# Patient Record
Sex: FEMALE | Race: WHITE | NOT HISPANIC OR LATINO | Employment: FULL TIME | URBAN - METROPOLITAN AREA
[De-identification: names, ages, dates, MRNs, and addresses within clinical notes are randomized per-mention and may not be internally consistent; named-entity substitution may affect disease eponyms.]

---

## 2017-03-24 ENCOUNTER — OFFICE VISIT (OUTPATIENT)
Dept: FAMILY MEDICINE | Facility: CLINIC | Age: 47
End: 2017-03-24
Payer: COMMERCIAL

## 2017-03-24 DIAGNOSIS — L84 CORN OR CALLUS: ICD-10-CM

## 2017-03-24 DIAGNOSIS — Z86.018 HISTORY OF DYSPLASTIC NEVUS: ICD-10-CM

## 2017-03-24 DIAGNOSIS — D22.9 MULTIPLE BENIGN MELANOCYTIC NEVI: ICD-10-CM

## 2017-03-24 DIAGNOSIS — Z12.83 SKIN CANCER SCREENING: Primary | ICD-10-CM

## 2017-03-24 DIAGNOSIS — Q82.5 CONGENITAL NEVUS OF BACK: ICD-10-CM

## 2017-03-24 PROCEDURE — 99213 OFFICE O/P EST LOW 20 MIN: CPT | Performed by: FAMILY MEDICINE

## 2017-03-24 NOTE — MR AVS SNAPSHOT
"              After Visit Summary   3/24/2017    Mildred Tellez    MRN: [id number]           Patient Information     Date Of Birth          1970        Visit Information        Provider Department      3/24/2017 12:00 PM Jenny Feliz MD [facility] - Primary Care Skin        Today's Diagnoses     Skin cancer screening    -  1    History of dysplastic nevus        Multiple benign melanocytic nevi        Corn or callus        Congenital nevus of back          Care Instructions    FUTURE APPOINTMENTS  Follow up in 1 year(s) for a full-body skin cancer screening.    TIPS FOR AVOIDING SKIN CANCER AND PREMATURE SKIN AGING  DOs    Wear a wide-brimmed hat and sunglasses.     Wear sun-protective clothing.    DearJane and other [employer] make sun protective clothing that is stylish, comfortable and cool.    [employer]s [employer]ius and other companies make UV arm sleeves suitable for golfing, gardening and other activities.      Wear sunscreen on your face every day, even in the winter. (UVA \"aging rays\" penetrates window glass and is just as strong in the winter as in the summer) Sunscreen with SPF > 30 is recommended.    Wear sunscreen on your body and re-apply every 2 hours when exposed to sun. Sunscreen with SPF > 50 is recommended.    You should use about 3 tablespoons of sunscreen to protect your whole body. Thus a typical eight ounce bottle of sunscreen should last 4 applications. Remember, that the SPF rating is compromised if you don t apply enough. Most people only apply 1/2 - 1/3 of the amount they need. Also don t forget areas such as your ears, feet, upper back and harder to reach places. Keep in mind that these amounts should be increased for larger body sizes.    Note that spray sunscreens are only for touch-up application, not as a base layer. Also, use spray sunscreens with caution around small children due to inhalation risk.    Product Recommendations:    Look for broad spectrum " "sunscreen (blocks both UVA and UVB).    Look for titanium dioxide and/or zinc oxide in the active ingredients, which are physical blockers as opposed to chemical blockers. Chemical-free sunscreens should not irritate the skin.    Good examples include: Blue Lizard, EltaMD, Vanicream, Solbar, CeraVe.     For sensitive skin, consider : SkinMedica Essential Defense Mineral Shield Broad Spectrum SPF 35      Avoid combination products that include both sunscreen and insect repellant, as sunscreen should be applied every 2 hours, but insect repellant should not be applied as frequently.    Avoid products that include oxybenzone or retinyl palmitate.    For more information:  http://www.skincancer.org/prevention/sun-protection/sunscreen/sunscreens-safe-and-effective    DON'Ts    All tanning damages the skin. Aim for ivory skin year round and you will have less trouble with your skin in years to come. There is no merit in getting \"a base tan\" before a warm weather vacation, as any tanning indicates your body's response to sun damage.    Never use tanning beds. Tanning beds are associated with much higher risks of skin cancer.    Avoid mid-day sunshine (10 AM to 3 PM), if possible.    Stop smoking. Smokers have higher rates of skin cancer and also have premature skin wrinkling.        Follow-ups after your visit        Who to contact     If you have questions or need follow up information about today's clinic visit or your schedule please contact [facility] - PRIMARY CARE SKIN directly at [phone].  Normal or non-critical lab and imaging results will be communicated to you by Volteahart, letter or phone within 4 business days after the clinic has received the results. If you do not hear from us within 7 days, please contact the clinic through Volteahart or phone. If you have a critical or abnormal lab result, we will notify you by phone as soon as possible.  Submit refill requests through [employer] or call your pharmacy " and they will forward the refill request to us. Please allow 3 business days for your refill to be completed.          Additional Information About Your Visit        Uboolyhart Information     AskforTask gives you secure access to your electronic health record. If you see a primary care provider, you can also send messages to your care team and make appointments. If you have questions, please call your primary care clinic.  If you do not have a primary care provider, please call [phone] and they will assist you.        Care EveryWhere ID     This is your Care EveryWhere ID. This could be used by other organizations to access your [place] medical records  [id number]         Blood Pressure from Last 3 Encounters:   09/23/16 102/62   09/10/16 112/64   06/28/16 120/60    Weight from Last 3 Encounters:   09/23/16 149 lb (67.6 kg)   09/10/16 150 lb 8 oz (68.3 kg)   06/28/16 150 lb (68 kg)              Today, you had the following     No orders found for display       Primary Care Provider Office Phone # Fax #    Mary Langston [phone]       [facility] [address] [place] [address]        Thank you!     Thank you for choosing [facility] - PRIMARY CARE SKIN  for your care. Our goal is always to provide you with excellent care. Hearing back from our patients is one way we can continue to improve our services. Please take a few minutes to complete the written survey that you may receive in the mail after your visit with us. Thank you!             Your Updated Medication List - Protect others around you: Learn how to safely use, store and throw away your medicines at www.disposemymeds.org.          This list is accurate as of: 3/24/17 12:39 PM.  Always use your most recent med list.                   Brand Name Dispense Instructions for use    amoxicillin 875 MG tablet    AMOXIL    20 tablet    Take 1 tablet (875 mg) by mouth 2 times daily       levothyroxine 125  MCG tablet    SYNTHROID/LEVOTHROID    90 tablet    TAKE ONE TABLET BY MOUTH ONCE DAILY

## 2017-03-24 NOTE — PATIENT INSTRUCTIONS
"FUTURE APPOINTMENTS  Follow up in 1 year(s) for a full-body skin cancer screening.    TIPS FOR AVOIDING SKIN CANCER AND PREMATURE SKIN AGING  DOs    Wear a wide-brimmed hat and sunglasses.     Wear sun-protective clothing.    [employer] and other Rather[employer] make sun protective clothing that is stylish, comfortable and cool.    Ulympix and other Rather[employer] make UV arm sleeves suitable for golfing, gardening and other activities.      Wear sunscreen on your face every day, even in the winter. (UVA \"aging rays\" penetrates window glass and is just as strong in the winter as in the summer) Sunscreen with SPF > 30 is recommended.    Wear sunscreen on your body and re-apply every 2 hours when exposed to sun. Sunscreen with SPF > 50 is recommended.    You should use about 3 tablespoons of sunscreen to protect your whole body. Thus a typical eight ounce bottle of sunscreen should last 4 applications. Remember, that the SPF rating is compromised if you don t apply enough. Most people only apply 1/2 - 1/3 of the amount they need. Also don t forget areas such as your ears, feet, upper back and harder to reach places. Keep in mind that these amounts should be increased for larger body sizes.    Note that spray sunscreens are only for touch-up application, not as a base layer. Also, use spray sunscreens with caution around small children due to inhalation risk.    Product Recommendations:    Look for broad spectrum sunscreen (blocks both UVA and UVB).    Look for titanium dioxide and/or zinc oxide in the active ingredients, which are physical blockers as opposed to chemical blockers. Chemical-free sunscreens should not irritate the skin.    Good examples include: Blue Lizard, EltaMD, Vanicream, Solbar, CeraVe.     For sensitive skin, consider : SkinMedica Essential Defense Mineral Shield Broad Spectrum SPF 35      Avoid combination products that include both sunscreen and insect repellant, as sunscreen should be " "applied every 2 hours, but insect repellant should not be applied as frequently.    Avoid products that include oxybenzone or retinyl palmitate.    For more information:  http://www.skincancer.org/prevention/sun-protection/sunscreen/sunscreens-safe-and-effective    DON'Ts    All tanning damages the skin. Aim for ivory skin year round and you will have less trouble with your skin in years to come. There is no merit in getting \"a base tan\" before a warm weather vacation, as any tanning indicates your body's response to sun damage.    Never use tanning beds. Tanning beds are associated with much higher risks of skin cancer.    Avoid mid-day sunshine (10 AM to 3 PM), if possible.    Stop smoking. Smokers have higher rates of skin cancer and also have premature skin wrinkling.  "

## 2017-05-01 DIAGNOSIS — E03.9 HYPOTHYROIDISM, UNSPECIFIED: ICD-10-CM

## 2017-05-02 RX ORDER — LEVOTHYROXINE SODIUM 125 UG/1
TABLET ORAL
Qty: 90 TABLET | Refills: 1 | Status: SHIPPED | OUTPATIENT
Start: 2017-05-02 | End: 2017-09-15

## 2017-05-02 NOTE — TELEPHONE ENCOUNTER
levothyroxine (SYNTHROID, LEVOTHROID) 125 MCG tablet  Last Written Prescription Date: 11/21/2016  Last Quantity: 90, # refills: 1  Last Office Visit with G, P or [facility] prescribing provider: 9/23/2016        TSH   Date Value Ref Range Status   09/10/2016 1.20 0.40 - 4.00 mU/L Final     Refill per RN protocol     Magdalena Morales RN, BSN  [facility]

## 2017-06-21 PROCEDURE — G0202 SCR MAMMO BI INCL CAD: HCPCS | Mod: TC

## 2017-09-15 ENCOUNTER — OFFICE VISIT (OUTPATIENT)
Dept: FAMILY MEDICINE | Facility: CLINIC | Age: 47
End: 2017-09-15
Payer: COMMERCIAL

## 2017-09-15 VITALS
SYSTOLIC BLOOD PRESSURE: 100 MMHG | DIASTOLIC BLOOD PRESSURE: 66 MMHG | HEART RATE: 71 BPM | TEMPERATURE: 98.7 F | HEIGHT: 63 IN | WEIGHT: 154 LBS | OXYGEN SATURATION: 98 % | BODY MASS INDEX: 27.29 KG/M2

## 2017-09-15 DIAGNOSIS — E78.5 HYPERLIPIDEMIA LDL GOAL <160: ICD-10-CM

## 2017-09-15 DIAGNOSIS — E03.9 HYPOTHYROIDISM, UNSPECIFIED: Primary | ICD-10-CM

## 2017-09-15 DIAGNOSIS — Z23 NEEDS FLU SHOT: ICD-10-CM

## 2017-09-15 DIAGNOSIS — Z00.01 ENCOUNTER FOR ROUTINE ADULT MEDICAL EXAM WITH ABNORMAL FINDINGS: ICD-10-CM

## 2017-09-15 DIAGNOSIS — Z23 NEED FOR PROPHYLACTIC VACCINATION AND INOCULATION AGAINST INFLUENZA: ICD-10-CM

## 2017-09-15 LAB
ALBUMIN SERPL-MCNC: 3.8 G/DL (ref 3.4–5)
ALP SERPL-CCNC: 48 U/L (ref 40–150)
ALT SERPL W P-5'-P-CCNC: 24 U/L (ref 0–50)
ANION GAP SERPL CALCULATED.3IONS-SCNC: 7 MMOL/L (ref 3–14)
AST SERPL W P-5'-P-CCNC: 9 U/L (ref 0–45)
BILIRUB SERPL-MCNC: 0.3 MG/DL (ref 0.2–1.3)
BUN SERPL-MCNC: 14 MG/DL (ref 7–30)
CALCIUM SERPL-MCNC: 8.7 MG/DL (ref 8.5–10.1)
CHLORIDE SERPL-SCNC: 108 MMOL/L (ref 94–109)
CHOLEST SERPL-MCNC: 178 MG/DL
CO2 SERPL-SCNC: 25 MMOL/L (ref 20–32)
CREAT SERPL-MCNC: 0.75 MG/DL (ref 0.52–1.04)
ERYTHROCYTE [DISTWIDTH] IN BLOOD BY AUTOMATED COUNT: 12.7 % (ref 10–15)
GFR SERPL CREATININE-BSD FRML MDRD: 83 ML/MIN/1.7M2
GLUCOSE SERPL-MCNC: 85 MG/DL (ref 70–99)
HCT VFR BLD AUTO: 41.1 % (ref 35–47)
HDLC SERPL-MCNC: 41 MG/DL
HGB BLD-MCNC: 13.8 G/DL (ref 11.7–15.7)
LDLC SERPL CALC-MCNC: 117 MG/DL
MCH RBC QN AUTO: 31.9 PG (ref 26.5–33)
MCHC RBC AUTO-ENTMCNC: 33.6 G/DL (ref 31.5–36.5)
MCV RBC AUTO: 95 FL (ref 78–100)
NONHDLC SERPL-MCNC: 137 MG/DL
PLATELET # BLD AUTO: 303 10E9/L (ref 150–450)
POTASSIUM SERPL-SCNC: 4.2 MMOL/L (ref 3.4–5.3)
PROT SERPL-MCNC: 7.6 G/DL (ref 6.8–8.8)
RBC # BLD AUTO: 4.33 10E12/L (ref 3.8–5.2)
SODIUM SERPL-SCNC: 140 MMOL/L (ref 133–144)
T3 SERPL-MCNC: 89 NG/DL (ref 60–181)
T4 FREE SERPL-MCNC: 1.45 NG/DL (ref 0.76–1.46)
TRIGL SERPL-MCNC: 101 MG/DL
TSH SERPL DL<=0.005 MIU/L-ACNC: 2.61 MU/L (ref 0.4–4)
WBC # BLD AUTO: 10.2 10E9/L (ref 4–11)

## 2017-09-15 PROCEDURE — 84439 ASSAY OF FREE THYROXINE: CPT | Performed by: FAMILY MEDICINE

## 2017-09-15 PROCEDURE — 36415 COLL VENOUS BLD VENIPUNCTURE: CPT | Performed by: FAMILY MEDICINE

## 2017-09-15 PROCEDURE — 80053 COMPREHEN METABOLIC PANEL: CPT | Performed by: FAMILY MEDICINE

## 2017-09-15 PROCEDURE — 84480 ASSAY TRIIODOTHYRONINE (T3): CPT | Performed by: FAMILY MEDICINE

## 2017-09-15 PROCEDURE — 84443 ASSAY THYROID STIM HORMONE: CPT | Performed by: FAMILY MEDICINE

## 2017-09-15 PROCEDURE — 85027 COMPLETE CBC AUTOMATED: CPT | Performed by: FAMILY MEDICINE

## 2017-09-15 PROCEDURE — 80061 LIPID PANEL: CPT | Performed by: FAMILY MEDICINE

## 2017-09-15 PROCEDURE — 99396 PREV VISIT EST AGE 40-64: CPT | Performed by: FAMILY MEDICINE

## 2017-09-15 RX ORDER — LEVOTHYROXINE SODIUM 125 UG/1
125 TABLET ORAL DAILY
Qty: 90 TABLET | Refills: 1 | Status: SHIPPED | OUTPATIENT
Start: 2017-09-15 | End: 2018-05-16

## 2017-09-15 ASSESSMENT — ANXIETY QUESTIONNAIRES
GAD7 TOTAL SCORE: 0
1. FEELING NERVOUS, ANXIOUS, OR ON EDGE: NOT AT ALL
5. BEING SO RESTLESS THAT IT IS HARD TO SIT STILL: NOT AT ALL
2. NOT BEING ABLE TO STOP OR CONTROL WORRYING: NOT AT ALL
6. BECOMING EASILY ANNOYED OR IRRITABLE: NOT AT ALL
3. WORRYING TOO MUCH ABOUT DIFFERENT THINGS: NOT AT ALL
7. FEELING AFRAID AS IF SOMETHING AWFUL MIGHT HAPPEN: NOT AT ALL

## 2017-09-15 ASSESSMENT — PATIENT HEALTH QUESTIONNAIRE - PHQ9
SUM OF ALL RESPONSES TO PHQ QUESTIONS 1-9: 1
5. POOR APPETITE OR OVEREATING: NOT AT ALL

## 2017-09-15 NOTE — MR AVS SNAPSHOT
After Visit Summary   9/15/2017    Mildred Tellez    MRN: [id number]           Patient Information     Date Of Birth          1970        Visit Information        Provider Department      9/15/2017 7:45 AM Mary Langston MD [facility]        Today's Diagnoses     Hypothyroidism, unspecified    -  1    Encounter for routine adult medical exam with abnormal findings        Hyperlipidemia LDL goal <160        Need for prophylactic vaccination and inoculation against influenza        Needs flu shot          Care Instructions      Preventive Health Recommendations  Female Ages 40 to 49    Yearly exam:     See your health care provider every year in order to  1. Review health changes.   2. Discuss preventive care.    3. Review your medicines if your doctor prescribed any.      Get a Pap test every three years (unless you have an abnormal result and your provider advises testing more often).      If you get Pap tests with HPV test, you only need to test every 5 years, unless you have an abnormal result. You do not need a Pap test if your uterus was removed (hysterectomy) and you have not had cancer.      You should be tested each year for STDs (sexually transmitted diseases), if you're at risk.       Ask your doctor if you should have a mammogram.      Have a colonoscopy (test for colon cancer) if someone in your family has had colon cancer or polyps before age 50.       Have a cholesterol test every 5 years.       Have a diabetes test (fasting glucose) after age 45. If you are at risk for diabetes, you should have this test every 3 years.    Shots: Get a flu shot each year. Get a tetanus shot every 10 years.     Nutrition:     Eat at least 5 servings of fruits and vegetables each day.    Eat whole-grain bread, whole-wheat pasta and brown rice instead of white grains and rice.    Talk to your provider about Calcium and Vitamin D.     Lifestyle    Exercise at least 150 minutes a  week (an average of 30 minutes a day, 5 days a week). This will help you control your weight and prevent disease.    Limit alcohol to one drink per day.    No smoking.     Wear sunscreen to prevent skin cancer.    See your dentist every six months for an exam and cleaning.               Thank you for choosing [facility]  for your Health Care. It was a pleasure seeing you at your visit today. Please contact us with any questions or concerns you may have.                   Mary Langston MD                                  To reach your [facility] care team after hours call:   [phone]    Our clinic hours are:     Monday- 7:30 am - 7:00 pm                             Tuesday through Friday- 7:30 am - 5:00 pm                                        Saturday- 8:00 am - 12:00 pm                  Phone:  [phone]    Our pharmacy hours are:     Monday  8:00 am to 7:00 pm      Tuesday through Friday 8:00am to 6:00pm                        Saturday - 9:00 am to 1:00 pm      Sunday : Closed.              Phone:  [phone]      There is also information available at our web site:  [email or web address]    If your provider ordered any lab tests and you do not receive the results within 10 business days, please call the clinic.    If you need a medication refill please contact your pharmacy.  Please allow 2 business days for your refill to be completed.    Our clinic offers telephone visits and e visits.  Please ask one of your team members to explain more.      Use [email or web address] (secure email communication and access to your chart) to send your primary care provider a message or make an appointment. Ask someone on your Team how to sign up for [email or web address].                       Follow-ups after your visit        Who to contact     If you have questions or need follow up information about today's clinic visit or your schedule please contact [facility] directly at  "[phone].  Normal or non-critical lab and imaging results will be communicated to you by Newman Infinitehart, letter or phone within 4 business days after the clinic has received the results. If you do not hear from us within 7 days, please contact the clinic through Fibroblastt or phone. If you have a critical or abnormal lab result, we will notify you by phone as soon as possible.  Submit refill requests through [employer] or call your pharmacy and they will forward the refill request to us. Please allow 3 business days for your refill to be completed.          Additional Information About Your Visit        Newman Infinitehart Information     [employer] gives you secure access to your electronic health record. If you see a primary care provider, you can also send messages to your care team and make appointments. If you have questions, please call your primary care clinic.  If you do not have a primary care provider, please call [phone] and they will assist you.        Care EveryWhere ID     This is your Care EveryWhere ID. This could be used by other organizations to access your [place] medical records  [id number]        Your Vitals Were     Pulse Temperature Height Last Period Pulse Oximetry BMI (Body Mass Index)    71 98.7  F (37.1  C) (Oral) 5' 3\" (1.6 m) 09/04/2017 (Exact Date) 98% 27.28 kg/m2       Blood Pressure from Last 3 Encounters:   09/15/17 100/66   09/23/16 102/62   09/10/16 112/64    Weight from Last 3 Encounters:   09/15/17 154 lb (69.9 kg)   09/23/16 149 lb (67.6 kg)   09/10/16 150 lb 8 oz (68.3 kg)              We Performed the Following     CBC with platelets     Comprehensive metabolic panel     Lipid panel reflex to direct LDL     T3, total     T4 free     TSH          Today's Medication Changes          These changes are accurate as of: 9/15/17  8:49 AM.  If you have any questions, ask your nurse or doctor.               These medicines have changed or have updated prescriptions.        Dose/Directions    " levothyroxine 125 MCG tablet   Commonly known as:  SYNTHROID/LEVOTHROID   This may have changed:  See the new instructions.   Used for:  Hypothyroidism, unspecified   Changed by:  Mary Langston MD        Dose:  125 mcg   Take 1 tablet (125 mcg) by mouth daily   Quantity:  90 tablet   Refills:  1         Stop taking these medicines if you haven't already. Please contact your care team if you have questions.     amoxicillin 875 MG tablet   Commonly known as:  AMOXIL   Stopped by:  Mary Langston MD                Where to get your medicines      These medications were sent to [facility] [address] [facility]  [address] [place] [address]     Phone:  [phone]     levothyroxine 125 MCG tablet                Primary Care Provider Office Phone # Fax #    Mary Langston [phone]       [address] [place] [address]        Equal Access to Services     [facility]: Hadii violetta chow hadasho Soomaali, waaxda luqadaha, qaybta kaalmada adeegyada, waxay lexiin haypatrick morales . So [facility] [phone].    ATENCIÓN: Si habla español, tiene a sousa disposición servicios gratuitos de asistencia lingüística. Llame al [phone].    We comply with applicable federal civil rights laws and [place] laws. We do not discriminate on the basis of race, color, national origin, age, disability sex, sexual orientation or gender identity.            Thank you!     Thank you for choosing [facility]  for your care. Our goal is always to provide you with excellent care. Hearing back from our patients is one way we can continue to improve our services. Please take a few minutes to complete the written survey that you may receive in the mail after your visit with us. Thank you!             Your Updated Medication List - Protect others around you: Learn how to safely use, store and throw away your medicines  at www.disposemymeds.org.          This list is accurate as of: 9/15/17  8:49 AM.  Always use your most recent med list.                   Brand Name Dispense Instructions for use Diagnosis    levothyroxine 125 MCG tablet    SYNTHROID/LEVOTHROID    90 tablet    Take 1 tablet (125 mcg) by mouth daily    Hypothyroidism, unspecified

## 2017-09-15 NOTE — PROGRESS NOTES
SUBJECTIVE:   CC: Mildred Tellez is an 46 year old woman who presents for preventive health visit.     Healthy Habits:    Do you get at least three servings of calcium containing foods daily (dairy, green leafy vegetables, etc.)? yes    Amount of exercise or daily activities, outside of work: climb stairs of 4 floors, walking 5 days per week    Problems taking medications regularly No    Medication side effects: Possibly - wednesday was feeling palpitations    Have you had an eye exam in the past two years? yes    Do you see a dentist twice per year? yes    Do you have sleep apnea, excessive snoring or daytime drowsiness?no        Hypothyroidism Follow-up      Since last visit, patient describes the following symptoms: dry skin and fatigue    TSH   Date Value Ref Range Status   09/10/2016 1.20 0.40 - 4.00 mU/L Final   02/24/2016 2.00 0.40 - 4.00 mU/L Final   07/06/2015 2.88 0.40 - 4.00 mU/L Final   07/03/2014 1.57 0.4 - 5.0 mU/L Final   07/31/2013 1.89 0.4 - 5.0 mU/L Final     T4 Free   Date Value Ref Range Status   09/10/2016 1.46 0.76 - 1.46 ng/dL Final   02/24/2016 1.28 0.76 - 1.46 ng/dL Final   07/06/2015 1.35 0.76 - 1.46 ng/dL Final   07/03/2014 1.57 0.70 - 1.85 ng/dL Final   07/31/2013 1.67 0.70 - 1.85 ng/dL Final         Today's PHQ-2 Score:   PHQ-2 ( 1999 [employer]) 9/15/2017 9/10/2016   Q1: Little interest or pleasure in doing things 0 0   Q2: Feeling down, depressed or hopeless 0 0   PHQ-2 Score 0 0   Q1: Little interest or pleasure in doing things - -   Q2: Feeling down, depressed or hopeless - -   PHQ-2 Score - -       Abuse: Current or Past(Physical, Sexual or Emotional)- No  Do you feel safe in your environment - Yes      Social History   Substance Use Topics     Smoking status: Never Smoker     Smokeless tobacco: Never Used     Alcohol use 0.0 oz/week     0 Standard drinks or equivalent per week      Comment: rare     The patient does not drink >3 drinks per day nor >7 drinks per week.    Reviewed  orders with patient.  Reviewed health maintenance and updated orders accordingly - Yes  BP Readings from Last 3 Encounters:   09/15/17 100/66   16 102/62   09/10/16 112/64    Wt Readings from Last 3 Encounters:   09/15/17 154 lb (69.9 kg)   16 149 lb (67.6 kg)   09/10/16 150 lb 8 oz (68.3 kg)                  Patient Active Problem List   Diagnosis     Hypothyroidism, unspecified     Rosacea     Hyperlipidemia LDL goal <160     RLQ abdominal pain - when getting up from sitting in area of appy     Abnormal Pap smear, can't excl hi gd sq intraepithelial lesion (ASC-H)     Mastodynia- left breast     Compound nevus of neck - with dysplastic features - right neck 2013     ASCUS pap -  2013 - saw Dr. Beltran ob/gyn [place]      History of dysplastic nevus     Coxsackie virus infection - severe - summer of 2016 - hands and feet totally peeled and oral lesions very painful     Past Surgical History:   Procedure Laterality Date     C APPENDECTOMY         Social History   Substance Use Topics     Smoking status: Never Smoker     Smokeless tobacco: Never Used     Alcohol use 0.0 oz/week     0 Standard drinks or equivalent per week      Comment: rare     Family History   Problem Relation Age of Onset     Hypertension Mother      Hypertension Maternal Grandmother      DIABETES Paternal Grandmother       of complications fr. diabetes     C.A.D. Father      on blood thinners for his PAD     Cardiovascular Father      peripheral vascular disease- very heavy smoker      Lung Cancer Father      Breast Cancer Other      paternal great aunt         Current Outpatient Prescriptions   Medication Sig Dispense Refill     levothyroxine (SYNTHROID/LEVOTHROID) 125 MCG tablet Take 1 tablet (125 mcg) by mouth daily 90 tablet 1     [DISCONTINUED] levothyroxine (SYNTHROID/LEVOTHROID) 125 MCG tablet TAKE ONE TABLET BY MOUTH EVERY DAY 90 tablet 1     No Known Allergies  Recent Labs   Lab Test  09/10/16   1002  16   1722   07/06/15   1046  10/10/14   0955   LDL  129*   --   119  115   HDL  39*   --   45*  41*   TRIG  78   --   95  82   ALT  22   --   21  19   CR  0.70   --   0.69  0.78   GFRESTIMATED  >90  Non  GFR Calc     --   >90  Non  GFR Calc    81   GFRESTBLACK  >90   GFR Calc     --   >90   GFR Calc    >90   GFR Calc     POTASSIUM  3.9   --   4.4  4.3   TSH  1.20  2.00  2.88   --         Mammogram: Patient under age 50, mutual decision reflected in health maintenance.  Ma Diagnostic Digital Bilateral    Result Date: 9/12/2014  Narrative: MA DIAGNOSTIC DIGITAL BILATERAL with CAD, US BREAST LEFT -  9/12/2014 HISTORY:  Focal left breast tenderness. COMPARISON:  Screening mammograms 8/9/2013, 7/14/2006 BREAST DENSITY: Scattered fibroglandular densities. FINDINGS:  Standard bilateral diagnostic views were obtained. BB was placed on the left upper outer quadrant to denote the area of tenderness; no associated mammographic finding is evident. The pattern of scattered fibroglandular tissue in both breasts is stable in comparison with previous exams. There is no new or suspicious finding to suggest malignancy. Targeted left breast ultrasound was performed to further evaluate the area of tenderness. This shows only normal glandular tissue without suspicious cystic or solid lesion.     Ma Screening Digital Bilat - Future  (s+30)    Result Date: 6/22/2017  Narrative: SCREENING MAMMOGRAM, BILATERAL, DIGITAL w/CAD - 6/21/2017 4:10 PM BREAST SYMPTOMS: No current breast complaints. COMPARISON:  08/03/2015, 9/12/2014, 8/9/2013, 7/14/2006. BREAST DENSITY: Heterogeneously dense. COMMENTS: No findings of suspicion for malignancy.     Ma Screening Digital Bilateral    Result Date: 8/3/2015  Narrative: SCREENING MAMMOGRAM, BILATERAL, DIGITAL w/CAD - 8/3/2015 12:46 PM. BREAST SYMPTOMS: No current breast complaints. COMPARISON:  9/12/2014, 8/9/2013, 7/14/2006. BREAST  DENSITY: Heterogeneously dense. COMMENTS: No findings of suspicion for malignancy.          Us Breast Left    Result Date: 9/12/2014  Narrative: MA DIAGNOSTIC DIGITAL BILATERAL with CAD, US BREAST LEFT -  9/12/2014 HISTORY:  Focal left breast tenderness. COMPARISON:  Screening mammograms 8/9/2013, 7/14/2006 BREAST DENSITY: Scattered fibroglandular densities. FINDINGS:  Standard bilateral diagnostic views were obtained. BB was placed on the left upper outer quadrant to denote the area of tenderness; no associated mammographic finding is evident. The pattern of scattered fibroglandular tissue in both breasts is stable in comparison with previous exams. There is no new or suspicious finding to suggest malignancy. Targeted left breast ultrasound was performed to further evaluate the area of tenderness. This shows only normal glandular tissue without suspicious cystic or solid lesion.       History of abnormal Pap smear: YES - updated in Problem List and Health Maintenance accordingly  Lab Results   Component Value Date    PAP NIL 09/10/2016    PAP ASC-US 07/06/2015    PAP NIL 10/10/2014         Reviewed and updated as needed this visit by clinical staff  Tobacco  Allergies  Meds  Med Hx  Surg Hx  Fam Hx  Soc Hx        Reviewed and updated as needed this visit by Provider            ROS:feels tired quite a bit, commutes to [place] for work 4 days/week. Usually gets less than 7 hours of sleep, which she really needs. Last night got 7 hours of sleep and felt well rested this am.   C: NEGATIVE for fever, chills, change in weight  I: NEGATIVE for worrisome rashes, moles or lesions  E: NEGATIVE for vision changes or irritation  ENT: NEGATIVE for ear, mouth and throat problems  R: NEGATIVE for significant cough or SOB  B: NEGATIVE for masses, tenderness or discharge  CV: NEGATIVE for chest pain, palpitations or peripheral edema  GI: NEGATIVE for nausea, abdominal pain, heartburn, or change in bowel habits  : NEGATIVE  "for unusual urinary or vaginal symptoms. Periods are regular with a 26 day cycle usually.   M: NEGATIVE for significant arthralgias or myalgia  N: NEGATIVE for weakness, dizziness or paresthesias  P: NEGATIVE for changes in mood or affect  Felt a little flutter deep in the upper posterior neck under stress a couple of times lasting a few seconds in the last 2 months. No headache , no dizziness, No changes in speech, swallowing, hearing, coordination  or vision.   No numbness, tingling, or weakness in upper or lower extremities. No bowel or bladder control problems. otherwise has been feeling quite well.     OBJECTIVE:   /66 (BP Location: Left arm, Patient Position: Chair, Cuff Size: Adult Large)  Pulse 71  Temp 98.7  F (37.1  C) (Oral)  Ht 5' 3\" (1.6 m)  Wt 154 lb (69.9 kg)  LMP 09/04/2017 (Exact Date)  SpO2 98%  BMI 27.28 kg/m2  EXAM:  GENERAL: healthy, alert and no distress  EYES: Eyes grossly normal to inspection, PERRL and conjunctivae and sclerae normal  HENT: ear canals and TM's normal, nose and mouth without ulcers or lesions  NECK: no adenopathy, no asymmetry, masses, or scars and thyroid normal to palpation  RESP: lungs clear to auscultation - no rales, rhonchi or wheezes  BREAST: normal without masses, tenderness or nipple discharge and no palpable axillary masses or adenopathy  CV: regular rate and rhythm, normal S1 S2, no S3 or S4, no murmur, click or rub, no peripheral edema and peripheral pulses strong  ABDOMEN: soft, nontender, no hepatosplenomegaly, no masses and bowel sounds normal   (female): normal female external genitalia, normal urethral meatus, vaginal mucosa pink, moist, well rugated, and normal cervix/adnexa/uterus without masses or discharge  MS: no gross musculoskeletal defects noted, no edema  SKIN: no suspicious lesions or rashes  NEURO: Normal strength and tone, mentation intact and speech normal  PSYCH: mentation appears normal, affect normal/bright    ASSESSMENT/PLAN: " "      ICD-10-CM    1. Hypothyroidism, unspecified E03.9 TSH     T4 free     T3, total     levothyroxine (SYNTHROID/LEVOTHROID) 125 MCG tablet   2. Encounter for routine adult medical exam with abnormal findings Z00.01 CBC with platelets   3. Hyperlipidemia LDL goal <160 E78.5 Lipid panel reflex to direct LDL     Comprehensive metabolic panel     CANCELED: Glucose   4. Need for prophylactic vaccination and inoculation against influenza Z23    5. Needs flu shot Z23        COUNSELING:   Reviewed preventive health counseling, as reflected in patient instructions    Pt getting her flu shot through work in 2 weeks.      reports that she has never smoked. She has never used smokeless tobacco.    Estimated body mass index is 27.28 kg/(m^2) as calculated from the following:    Height as of this encounter: 5' 3\" (1.6 m).    Weight as of this encounter: 154 lb (69.9 kg).   Weight management plan: see next   Establish an exercise regimen. Activity goal: 45 minutes 5 days a week. New exercise routine: cardiovascular workout on exercise equipment, walking and weightlifting. Diet regimen was discussed and plan is self-directed dieting: reduce calories, reduce portions, reduce processed  carbs, increase fruits/vegetables and avoid sweets and supervised diet program. Avoid artificial sweeteners and \"diet\" drinks and sodas.       Counseling Resources:  ATP IV Guidelines  Pooled Cohorts Equation Calculator  Breast Cancer Risk Calculator  FRAX Risk Assessment  ICSI Preventive Guidelines  Dietary Guidelines for Americans, 2010  USDA's MyPlate  ASA Prophylaxis  Lung CA Screening    Mary Langston MD  [facility]  "

## 2017-09-15 NOTE — PATIENT INSTRUCTIONS
Preventive Health Recommendations  Female Ages 40 to 49    Yearly exam:     See your health care provider every year in order to  1. Review health changes.   2. Discuss preventive care.    3. Review your medicines if your doctor prescribed any.      Get a Pap test every three years (unless you have an abnormal result and your provider advises testing more often).      If you get Pap tests with HPV test, you only need to test every 5 years, unless you have an abnormal result. You do not need a Pap test if your uterus was removed (hysterectomy) and you have not had cancer.      You should be tested each year for STDs (sexually transmitted diseases), if you're at risk.       Ask your doctor if you should have a mammogram.      Have a colonoscopy (test for colon cancer) if someone in your family has had colon cancer or polyps before age 50.       Have a cholesterol test every 5 years.       Have a diabetes test (fasting glucose) after age 45. If you are at risk for diabetes, you should have this test every 3 years.    Shots: Get a flu shot each year. Get a tetanus shot every 10 years.     Nutrition:     Eat at least 5 servings of fruits and vegetables each day.    Eat whole-grain bread, whole-wheat pasta and brown rice instead of white grains and rice.    Talk to your provider about Calcium and Vitamin D.     Lifestyle    Exercise at least 150 minutes a week (an average of 30 minutes a day, 5 days a week). This will help you control your weight and prevent disease.    Limit alcohol to one drink per day.    No smoking.     Wear sunscreen to prevent skin cancer.    See your dentist every six months for an exam and cleaning.               Thank you for choosing [facility]  for your Health Care. It was a pleasure seeing you at your visit today. Please contact us with any questions or concerns you may have.                   Mary Langston MD                                  To reach your  [facility] team after hours call:   [phone]    Our clinic hours are:     Monday- 7:30 am - 7:00 pm                             Tuesday through Friday- 7:30 am - 5:00 pm                                        Saturday- 8:00 am - 12:00 pm                  Phone:  [phone]    Our pharmacy hours are:     Monday  8:00 am to 7:00 pm      Tuesday through Friday 8:00am to 6:00pm                        Saturday - 9:00 am to 1:00 pm      Sunday : Closed.              Phone:  [phone]      There is also information available at our web site:  [email or web address]    If your provider ordered any lab tests and you do not receive the results within 10 business days, please call the clinic.    If you need a medication refill please contact your pharmacy.  Please allow 2 business days for your refill to be completed.    Our clinic offers telephone visits and e visits.  Please ask one of your team members to explain more.      Use [employer] (secure email communication and access to your chart) to send your primary care provider a message or make an appointment. Ask someone on your Team how to sign up for [employer].

## 2017-09-15 NOTE — NURSING NOTE
"Chief Complaint   Patient presents with     Physical       Initial /66 (BP Location: Left arm, Patient Position: Chair, Cuff Size: Adult Large)  Pulse 71  Temp 98.7  F (37.1  C) (Oral)  Ht 5' 3\" (1.6 m)  Wt 154 lb (69.9 kg)  LMP 09/04/2017 (Exact Date)  SpO2 98%  BMI 27.28 kg/m2 Estimated body mass index is 27.28 kg/(m^2) as calculated from the following:    Height as of this encounter: 5' 3\" (1.6 m).    Weight as of this encounter: 154 lb (69.9 kg).  Medication Reconciliation: complete   Kiana Hannah CMA  "

## 2017-09-16 ASSESSMENT — ANXIETY QUESTIONNAIRES: GAD7 TOTAL SCORE: 0

## 2017-11-09 NOTE — PROGRESS NOTES
"  SUBJECTIVE:                                                    Mildred Tellez is a 46 year old female who presents to clinic today for the following health issues:    Back Pain    Onset: 4 months ago on and off    Description:   Location: left shoulder back  Character: Stabbing and Burning    Intensity: moderate    Progression of Symptoms: worse    Accompanying Signs & Symptoms:  Other symptoms: radiation of pain to the neck on left side    History:   Previous similar pain: no       Precipitating factors:   Trauma or overuse: no     Alleviating factors:  Improved by: massage    Therapies Tried and outcome: nothing    Pain developed 3 mos ago in August after camping trip. Radiates up to left subocciput. Massage and pressing a very specific circular area on left upper back helps relieve pain. Has not tried OTC analgesics because does not like taking pills. Often sleeps on left side with daughter. Little physical activity; used to walk, busy with work and family. Healthy diet, lives at home with  and 2 kids (14, 7), her mom.    Problem list and histories reviewed & adjusted, as indicated.  Additional history: as documented    ROS:  Constitutional, HEENT, cardiovascular, pulmonary, GI, , musculoskeletal, neuro, skin, endocrine and psych systems are negative, except as otherwise noted.    OBJECTIVE:                                                    /66 (BP Location: Left arm, Patient Position: Sitting, Cuff Size: Adult Large)  Pulse 83  Temp 98.1  F (36.7  C) (Oral)  Ht 5' 3\" (1.6 m)  Wt 153 lb (69.4 kg)  SpO2 99%  BMI 27.1 kg/m2 Body mass index is 27.1 kg/(m^2).   GENERAL: healthy, alert, well nourished, well hydrated, no distress  HENT: ear canals- normal; TMs- normal; Nose- normal; Mouth- no ulcers, no lesions  NECK: left suboccipital trigger point tenderness, otherwise no tenderness, no adenopathy, no asymmetry, no masses, no stiffness; thyroid- normal to palpation  RESP: lungs clear to " "auscultation - no rales, no rhonchi, no wheezes  CV: regular rates and rhythm, normal S1 S2, no S3 or S4 and no murmur, no click or rub -  ABDOMEN: soft, no tenderness, no  hepatosplenomegaly, no masses, normal bowel sounds  BACK: left trapezius trigger point tenderness, left rhomboid trigger point tenderness  Left shoulder: sensation intact to light touch, full ROM, internal rotation to T5 bilaterally, strength 5+     Diagnostic test results:  none      ASSESSMENT/PLAN:         Mildred was seen today for back pain.    Diagnoses and all orders for this visit:    Upper back pain on left side / Rhomboid pain / Neck pain - heat, stretching, ROm exercises  -     [facility] REFERRAL    Risks, benefits and alternatives of treatments discussed. Plan agreed on.      Followup: 2-3 months    Will call, return to clinic, or go to ED if worsening or symptoms not improving as discussed.    See patient instructions.     BMI:   Estimated body mass index is 27.1 kg/(m^2) as calculated from the following:    Height as of this encounter: 5' 3\" (1.6 m).    Weight as of this encounter: 153 lb (69.4 kg).   Weight management plan: Discussed healthy diet and exercise guidelines and patient will follow up in 6 months in clinic to re-evaluate.      Mike Lynch MS3 acted as a scribe for me today and accurately reflected my words and actions.    I agree with above History, Review of Systems, Physical exam and Plan.  I have reviewed the content of the documentation and have edited it as needed. I have personally performed the services documented here and the documentation accurately represents those services and the decisions I have made.              José Miguel Tinoco M.D.        "

## 2017-11-10 ENCOUNTER — OFFICE VISIT (OUTPATIENT)
Dept: FAMILY MEDICINE | Facility: CLINIC | Age: 47
End: 2017-11-10
Payer: COMMERCIAL

## 2017-11-10 VITALS
SYSTOLIC BLOOD PRESSURE: 110 MMHG | TEMPERATURE: 98.1 F | OXYGEN SATURATION: 99 % | HEIGHT: 63 IN | DIASTOLIC BLOOD PRESSURE: 66 MMHG | WEIGHT: 153 LBS | HEART RATE: 83 BPM | BODY MASS INDEX: 27.11 KG/M2

## 2017-11-10 DIAGNOSIS — M54.9 UPPER BACK PAIN ON LEFT SIDE: Primary | ICD-10-CM

## 2017-11-10 DIAGNOSIS — M79.18 RHOMBOID PAIN: ICD-10-CM

## 2017-11-10 DIAGNOSIS — M54.2 NECK PAIN: ICD-10-CM

## 2017-11-10 PROCEDURE — 99213 OFFICE O/P EST LOW 20 MIN: CPT | Performed by: FAMILY MEDICINE

## 2017-11-10 NOTE — MR AVS SNAPSHOT
After Visit Summary   11/10/2017    Mildred Tellez    MRN: [id number]           Patient Information     Date Of Birth          1970        Visit Information        Provider Department      11/10/2017 4:00 PM Dami Tinoco MD [facility]        Today's Diagnoses     Upper back pain on left side    -  1    Rhomboid pain        Neck pain          Care Instructions    Rhomboid Muscle Strain or Spasm                   Rhomboid Muscle Strain or Spasm    What is a rhomboid muscle strain or spasm?   Your rhomboid muscles are in your upper back. These muscles connect the inner edges of your shoulder blades to your spine. A strain is a stretch or tear of a muscle or tendon. A muscle spasm is an involuntary contraction of the muscle.   How does it occur?   A rhomboid muscle strain or spasm is usually caused by overuse of your shoulder and arm. This happens during overhead activities like serving a tennis ball or reaching to put objects on a high shelf.   It can also occur from activities such as:   rowing   carrying a heavy backpack, especially over one shoulder   poor posture, especially from using a computer for a long period   What are the symptoms?   A strain causes pain in your upper back between your shoulder blades and your spine. A spasm feels like a knot or tightness in the muscle. You may have pain when you move your shoulders or when you breathe.   How is it diagnosed?   Your healthcare provider will examine your back and shoulder to check if the muscles are tender or tight.   How is it treated?   To treat this condition:   Put an ice pack, gel pack, or package of frozen vegetables, wrapped in a cloth on the area every 3 to 4 hours, for up to 20 minutes at a time. You can lie down with your rhomboid muscles against the ice.   Moist heat can help muscle spasms that are constant or that happen again and again. Use moist heat for up to 20 minutes at a time to help relax tight  muscles or muscle spasms. Do not use heat if you have swelling.   Take an anti-inflammatory medicine such as ibuprofen, or other medicine as directed by your provider. Nonsteroidal anti-inflammatory medicines (NSAIDs) may cause stomach bleeding and other problems. These risks increase with age. Read the label and take as directed. Unless recommended by your healthcare provider, do not take for more than 10 days.   Massage is also very helpful. You can do a form of self-massage by putting a tennis ball on the floor, lying down with your rhomboid muscles against the ball, and gently rolling the ball against your muscles. You can also buy a foam roller or a self-massage tool, such as a Thera-Cane or Body Back Buddy.   Your provider may recommend physical therapy. You will be given a set of rehabilitation exercises.   While you recover from your injury you will need to change your sport or activity to one that does not make your condition worse. For example, you may need to run or bicycle instead of playing tennis or rowing.   How long will the effects last?   The length of recovery depends on many factors such as your age, health, and if you have had a previous injury. Recovery time also depends on the severity of the injury. A mild rhomboid strain may recover within a few weeks, but a severe injury may take 6 weeks or longer to recover. You need to stop doing the activities that cause pain until your muscle has healed. If you continue doing activities that cause pain, your symptoms will return and it will take longer to recover.   When can I return to my normal activities?   Everyone recovers from an injury at a different rate. Return to your activities depends on how soon your back recovers, not by how many days or weeks it has been since your injury has occurred. In general, the longer you have symptoms before you start treatment, the longer it will take to get better. The goal is to return to your normal activities  as soon as is safely possible.   You may safely return to your activities when the muscles are no longer in spasm and you can move your shoulders and arms without pain.   How can I prevent a rhomboid muscle strain or spasm?   Warm up properly and stretch before activities such as tennis, rowing, or overhead movements. If you work on a computer, take frequent breaks to stretch your neck and back.     Published by [employer].  This content is reviewed periodically and is subject to change as new health information becomes available. The information is intended to inform and educate and is not a replacement for medical evaluation, advice, diagnosis or treatment by a healthcare professional.   Written by Lasha Schafer MD, for [employer].   ? 2010 [employer] and/or its affiliates. All Rights Reserved.   Copyright   [employer] 2011  Adult Health Advisor    Rhomboid Muscle Strain or Spasm Rehabilitation Exercises              You may do all of these exercises right away.   Pectoralis stretch:  a doorway or corner with both hands slightly above your head on the door frame or wall. Slowly lean forward until you feel a stretch in the front of your shoulders. Hold 15 to 30 seconds. Repeat 3 times.   Thoracic extension: While sitting in a chair, clasp both arms behind your head. Gently arch backward and look up toward the ceiling. Repeat 10 times. Do this several times each day.   Arm slide on wall: Sit or stand with your back against a wall and your elbows and wrists against the wall. Slowly slide your arms upward as high as you can while keeping your elbows and wrists against the wall. Do 3 sets of 10.   Scapular squeeze: While sitting or standing with your arms by your sides, squeeze your shoulder blades together and hold for 5 seconds. Do 3 sets of 10.   Mid-trap exercise: Lie on your stomach on a firm surface and place a folded pillow underneath your chest. Place your arms out straight to your  sides with your elbows straight and thumbs toward the ceiling. Slowly raise your arms toward the ceiling as you squeeze your shoulder blades together. Lower slowly. Do 3 sets of 15. As the exercise gets easier to do, hold soup cans or small weights in your hands.   Thoracic stretch: Sit on the floor with your legs out straight in front of you. Hold your mid-thighs with your hands. Curl you head and neck toward your belly button. Hold for a count of 15. Repeat 3 times.   Thoracic side stretch: To stretch your right upper back, point your right elbow and shoulders forward while twisting your trunk to the left. Hold for a count of 15. Repeat 3 times. To stretch your left upper back, point your left elbow and shoulder forward while twisting your trunk to the right. Hold for a count of 10. Repeat 3 times.   Rowing exercise: Close middle of elastic tubing in a door or wrap tubing around an immovable object. Hold 1 end in each hand. Sit in a chair, bend your arms 90 degrees, and hold one end of the tubing in each hand. Keep your forearms vertical and your elbows at shoulder level and bent to 90 degrees. Pull backward on the band and squeeze your shoulder blades together. Do 3 sets of 10.   Published by [facility].  This content is reviewed periodically and is subject to change as new health information becomes available. The information is intended to inform and educate and is not a replacement for medical evaluation, advice, diagnosis or treatment by a healthcare professional.   Written by Tiffany Lr PT, [facility], [facility], and Lasha Schafer MD.   ? 2010 [facility] and/or its affiliates. All Rights Reserved.   Copyright   [employer] Reference Systems 2011  Adult Health Advisor                              Neck Spasm Rehabilitation Exercises   You may do all of these exercises right away but avoid any movements that increase your pain.     Neck rotation with flexion:   Right: Turn your head to the right and clasp your hands  behind your head. Let the weight of your arms pull your chin to the right side of your chest. Relax. Hold for a count of 15. Do this 3 times.   Left: Turn your head to the left and clasp your hands behind your head. Let the weight of your arms pull your chin to the left side of your chest. Relax. Hold for a count of 15. Do this 3 times.     Chin tuck: Place your fingertips on your chin and gently push your head straight back as if you are trying to make a double chin. Keep looking forward as your head moves back. Hold 5 seconds and repeat 5 times.     Scalene stretch: This stretches the neck muscles that attach to your ribs. Sitting in an upright position, clasp both hands behind your back, lower your left shoulder, and tilt your head toward the right. Hold this position for 15 to 30 seconds and then come back to the starting position. Lower your right shoulder and tilt your head toward the left until you feel a stretch. Hold for 15 to 30 seconds. Repeat 3 times on each side.     Neck rotation stretch   Right side: Rotate your neck by looking over your right shoulder. Lift your right hand and place your palm on the left side of your chin. Push your chin with your palm toward your right shoulder. Hold for a count of 10. Do this 3 times.   Left side: Rotate your neck by looking over your left shoulder. Lift your left hand and place your palm on the right side of your chin. Push your chin with your palm toward your left shoulder. Hold for a count of 10. Do this 3 times.     Scapular squeeze: While sitting or standing with your arms by your sides, squeeze your shoulder blades together and hold for 5 seconds. Do 3 sets of 10.     Thoracic extension: While sitting in a chair, clasp both arms behind your head. Gently arch backward and look up toward the ceiling. Repeat 10 times. Do this several times per day.                      Follow-ups after your visit        Additional Services     [facility] PT, HAND, AND CHIROPRACTIC  REFERRAL       **This order will print in the [facility] Scheduling Office**    Physical Therapy, Hand Therapy and Chiropractic Care are available through:    *[facility]  *[facility]  *[facility]    Call one number to schedule at any of the above locations: [phone].    Your provider has referred you to: Physical Therapy at [facility] or [facility]    Indication/Reason for Referral: Neck Pain, Thoracic Pain and rhomboid  Onset of Illness: 3 month   Therapy Orders: Evaluate and Treat  Special Programs:   Special Request: Manual Therapy: Myofascial Release/Massage  None    Maria Del Rosario    Maria Del Rosario      Additional Comments for the Therapist or Chiropractor:    Please be aware that coverage of these services is subject to the terms and limitations of your health insurance plan.  Call member services at your health plan with any benefit or coverage questions.      Please bring the following to your appointment:    *Your personal calendar for scheduling future appointments  *Comfortable clothing                  Who to contact     If you have questions or need follow up information about today's clinic visit or your schedule please contact [facility] directly at [phone].  Normal or non-critical lab and imaging results will be communicated to you by [employer], letter or phone within 4 business days after the clinic has received the results. If you do not hear from us within 7 days, please contact the clinic through [employer] or phone. If you have a critical or abnormal lab result, we will notify you by phone as soon as possible.  Submit refill requests through [employer] or call your pharmacy and they will forward the refill request to us. Please allow 3 business days for your refill to be completed.          Additional Information About Your Visit        [employer] Information     [employer] gives you secure access to your electronic health record. If you see a primary care  "provider, you can also send messages to your care team and make appointments. If you have questions, please call your primary care clinic.  If you do not have a primary care provider, please call [phone] and they will assist you.        Care EveryWhere ID     This is your Care EveryWhere ID. This could be used by other organizations to access your [facility] medical records  [id number]        Your Vitals Were     Pulse Temperature Height Pulse Oximetry BMI (Body Mass Index)       83 98.1  F (36.7  C) (Oral) 5' 3\" (1.6 m) 99% 27.1 kg/m2        Blood Pressure from Last 3 Encounters:   11/10/17 110/66   09/15/17 100/66   09/23/16 102/62    Weight from Last 3 Encounters:   11/10/17 153 lb (69.4 kg)   09/15/17 154 lb (69.9 kg)   09/23/16 149 lb (67.6 kg)              We Performed the Following     [facility] PT, HAND, AND CHIROPRACTIC REFERRAL        Primary Care Provider Office Phone # Fax #    Mary Langston [phone]       [address] [facility] [address]        Equal Access to Services     TRUMAN SIMS AH: Hadii aad claudine hadasho Soomaali, waaxda luqadaha, qaybta kaalmada adeegyada, paola melton. So [facility] [phone].    ATENCIÓN: Si habla español, tiene a sousa disposición servicios gratuitos de asistencia lingüística. Llame al [phone].    We comply with applicable federal civil rights laws and [place] laws. We do not discriminate on the basis of race, color, national origin, age, disability, sex, sexual orientation, or gender identity.            Thank you!     Thank you for choosing [facility]  for your care. Our goal is always to provide you with excellent care. Hearing back from our patients is one way we can continue to improve our services. Please take a few minutes to complete the written survey that you may receive in the mail after your visit with us. Thank you!             Your Updated Medication List - Protect others around " you: Learn how to safely use, store and throw away your medicines at www.disposemymeds.org.          This list is accurate as of: 11/10/17  5:12 PM.  Always use your most recent med list.                   Brand Name Dispense Instructions for use Diagnosis    levothyroxine 125 MCG tablet    SYNTHROID/LEVOTHROID    90 tablet    Take 1 tablet (125 mcg) by mouth daily    Hypothyroidism, unspecified

## 2017-11-10 NOTE — PATIENT INSTRUCTIONS
Rhomboid Muscle Strain or Spasm                   Rhomboid Muscle Strain or Spasm    What is a rhomboid muscle strain or spasm?   Your rhomboid muscles are in your upper back. These muscles connect the inner edges of your shoulder blades to your spine. A strain is a stretch or tear of a muscle or tendon. A muscle spasm is an involuntary contraction of the muscle.   How does it occur?   A rhomboid muscle strain or spasm is usually caused by overuse of your shoulder and arm. This happens during overhead activities like serving a tennis ball or reaching to put objects on a high shelf.   It can also occur from activities such as:   rowing   carrying a heavy backpack, especially over one shoulder   poor posture, especially from using a computer for a long period   What are the symptoms?   A strain causes pain in your upper back between your shoulder blades and your spine. A spasm feels like a knot or tightness in the muscle. You may have pain when you move your shoulders or when you breathe.   How is it diagnosed?   Your healthcare provider will examine your back and shoulder to check if the muscles are tender or tight.   How is it treated?   To treat this condition:   Put an ice pack, gel pack, or package of frozen vegetables, wrapped in a cloth on the area every 3 to 4 hours, for up to 20 minutes at a time. You can lie down with your rhomboid muscles against the ice.   Moist heat can help muscle spasms that are constant or that happen again and again. Use moist heat for up to 20 minutes at a time to help relax tight muscles or muscle spasms. Do not use heat if you have swelling.   Take an anti-inflammatory medicine such as ibuprofen, or other medicine as directed by your provider. Nonsteroidal anti-inflammatory medicines (NSAIDs) may cause stomach bleeding and other problems. These risks increase with age. Read the label and take as directed. Unless recommended by your healthcare provider, do not take for more than 10  days.   Massage is also very helpful. You can do a form of self-massage by putting a tennis ball on the floor, lying down with your rhomboid muscles against the ball, and gently rolling the ball against your muscles. You can also buy a foam roller or a self-massage tool, such as a Thera-Cane or Body Back Buddy.   Your provider may recommend physical therapy. You will be given a set of rehabilitation exercises.   While you recover from your injury you will need to change your sport or activity to one that does not make your condition worse. For example, you may need to run or bicycle instead of playing tennis or rowing.   How long will the effects last?   The length of recovery depends on many factors such as your age, health, and if you have had a previous injury. Recovery time also depends on the severity of the injury. A mild rhomboid strain may recover within a few weeks, but a severe injury may take 6 weeks or longer to recover. You need to stop doing the activities that cause pain until your muscle has healed. If you continue doing activities that cause pain, your symptoms will return and it will take longer to recover.   When can I return to my normal activities?   Everyone recovers from an injury at a different rate. Return to your activities depends on how soon your back recovers, not by how many days or weeks it has been since your injury has occurred. In general, the longer you have symptoms before you start treatment, the longer it will take to get better. The goal is to return to your normal activities as soon as is safely possible.   You may safely return to your activities when the muscles are no longer in spasm and you can move your shoulders and arms without pain.   How can I prevent a rhomboid muscle strain or spasm?   Warm up properly and stretch before activities such as tennis, rowing, or overhead movements. If you work on a computer, take frequent breaks to stretch your neck and back.      Published by [employer].  This content is reviewed periodically and is subject to change as new health information becomes available. The information is intended to inform and educate and is not a replacement for medical evaluation, advice, diagnosis or treatment by a healthcare professional.   Written by Lasha Schafer MD, for [employer].   ? 2010 [facility] and/or its affiliates. All Rights Reserved.   Copyright   [employer] 2011  Adult Health Advisor    Rhomboid Muscle Strain or Spasm Rehabilitation Exercises              You may do all of these exercises right away.   Pectoralis stretch:  a doorway or corner with both hands slightly above your head on the door frame or wall. Slowly lean forward until you feel a stretch in the front of your shoulders. Hold 15 to 30 seconds. Repeat 3 times.   Thoracic extension: While sitting in a chair, clasp both arms behind your head. Gently arch backward and look up toward the ceiling. Repeat 10 times. Do this several times each day.   Arm slide on wall: Sit or stand with your back against a wall and your elbows and wrists against the wall. Slowly slide your arms upward as high as you can while keeping your elbows and wrists against the wall. Do 3 sets of 10.   Scapular squeeze: While sitting or standing with your arms by your sides, squeeze your shoulder blades together and hold for 5 seconds. Do 3 sets of 10.   Mid-trap exercise: Lie on your stomach on a firm surface and place a folded pillow underneath your chest. Place your arms out straight to your sides with your elbows straight and thumbs toward the ceiling. Slowly raise your arms toward the ceiling as you squeeze your shoulder blades together. Lower slowly. Do 3 sets of 15. As the exercise gets easier to do, hold soup cans or small weights in your hands.   Thoracic stretch: Sit on the floor with your legs out straight in front of you. Hold your mid-thighs with your hands. Curl you head and  neck toward your belly button. Hold for a count of 15. Repeat 3 times.   Thoracic side stretch: To stretch your right upper back, point your right elbow and shoulders forward while twisting your trunk to the left. Hold for a count of 15. Repeat 3 times. To stretch your left upper back, point your left elbow and shoulder forward while twisting your trunk to the right. Hold for a count of 10. Repeat 3 times.   Rowing exercise: Close middle of elastic tubing in a door or wrap tubing around an immovable object. Hold 1 end in each hand. Sit in a chair, bend your arms 90 degrees, and hold one end of the tubing in each hand. Keep your forearms vertical and your elbows at shoulder level and bent to 90 degrees. Pull backward on the band and squeeze your shoulder blades together. Do 3 sets of 10.   Published by [employer].  This content is reviewed periodically and is subject to change as new health information becomes available. The information is intended to inform and educate and is not a replacement for medical evaluation, advice, diagnosis or treatment by a healthcare professional.   Written by Tiffany Lr PT, [facility], \[facility] [place] [facility]\"", and Lasha Schafer MD.   ? 2010 [facility] and/or its affiliates. All Rights Reserved.   Copyright   [employer] Systems 2011  Adult Health Advisor                              Neck Spasm Rehabilitation Exercises   You may do all of these exercises right away but avoid any movements that increase your pain.     Neck rotation with flexion:   Right: Turn your head to the right and clasp your hands behind your head. Let the weight of your arms pull your chin to the right side of your chest. Relax. Hold for a count of 15. Do this 3 times.   Left: Turn your head to the left and clasp your hands behind your head. Let the weight of your arms pull your chin to the left side of your chest. Relax. Hold for a count of 15. Do this 3 times.     Chin tuck: Place your fingertips on your chin and gently  push your head straight back as if you are trying to make a double chin. Keep looking forward as your head moves back. Hold 5 seconds and repeat 5 times.     Scalene stretch: This stretches the neck muscles that attach to your ribs. Sitting in an upright position, clasp both hands behind your back, lower your left shoulder, and tilt your head toward the right. Hold this position for 15 to 30 seconds and then come back to the starting position. Lower your right shoulder and tilt your head toward the left until you feel a stretch. Hold for 15 to 30 seconds. Repeat 3 times on each side.     Neck rotation stretch   Right side: Rotate your neck by looking over your right shoulder. Lift your right hand and place your palm on the left side of your chin. Push your chin with your palm toward your right shoulder. Hold for a count of 10. Do this 3 times.   Left side: Rotate your neck by looking over your left shoulder. Lift your left hand and place your palm on the right side of your chin. Push your chin with your palm toward your left shoulder. Hold for a count of 10. Do this 3 times.     Scapular squeeze: While sitting or standing with your arms by your sides, squeeze your shoulder blades together and hold for 5 seconds. Do 3 sets of 10.     Thoracic extension: While sitting in a chair, clasp both arms behind your head. Gently arch backward and look up toward the ceiling. Repeat 10 times. Do this several times per day.

## 2017-11-10 NOTE — NURSING NOTE
"Chief Complaint   Patient presents with     Back Pain       Initial /66 (BP Location: Left arm, Patient Position: Sitting, Cuff Size: Adult Large)  Pulse 83  Temp 98.1  F (36.7  C) (Oral)  Ht 5' 3\" (1.6 m)  Wt 153 lb (69.4 kg)  SpO2 99%  BMI 27.1 kg/m2 Estimated body mass index is 27.1 kg/(m^2) as calculated from the following:    Height as of this encounter: 5' 3\" (1.6 m).    Weight as of this encounter: 153 lb (69.4 kg).  Medication Reconciliation: complete  "

## 2017-11-17 ENCOUNTER — THERAPY VISIT (OUTPATIENT)
Dept: PHYSICAL THERAPY | Facility: CLINIC | Age: 47
End: 2017-11-17
Payer: COMMERCIAL

## 2017-11-17 DIAGNOSIS — M54.2 CERVICALGIA: Primary | ICD-10-CM

## 2017-11-17 PROCEDURE — 97161 PT EVAL LOW COMPLEX 20 MIN: CPT | Mod: GP | Performed by: PHYSICAL THERAPIST

## 2017-11-17 PROCEDURE — 97112 NEUROMUSCULAR REEDUCATION: CPT | Mod: GP | Performed by: PHYSICAL THERAPIST

## 2017-11-17 NOTE — MR AVS SNAPSHOT
After Visit Summary   11/17/2017    Mildred Tellez    MRN: [id number]           Patient Information     Date Of Birth          1970        Visit Information        Provider Department      11/17/2017 4:30 PM Renetta Webber, PT [facility]        Today's Diagnoses     Cervicalgia    -  1       Follow-ups after your visit        Your next 10 appointments already scheduled     Dec 01, 2017 12:00 PM CST   [facility] Spine with Renetta Webber PT   [facility] ([facility])    [address]  [place] [id number] [phone]            Dec 08, 2017 12:00 PM CST   [facility] Spine with Renetta Webber PT   [facility] ([facility])    [address] [place] [id number] [phone]              Who to contact     If you have questions or need follow up information about today's clinic visit or your schedule please contact [facility] directly at [phone].  Normal or non-critical lab and imaging results will be communicated to you by SocialKatyhart, letter or phone within 4 business days after the clinic has received the results. If you do not hear from us within 7 days, please contact the clinic through CURRENTt or phone. If you have a critical or abnormal lab result, we will notify you by phone as soon as possible.  Submit refill requests through FanChatter or call your pharmacy and they will forward the refill request to us. Please allow 3 business days for your refill to be completed.          Additional Information About Your Visit        SocialKatyhart Information     FanChatter gives you secure access to your electronic health record. If you see a primary care provider, you can also send messages to your care team and make appointments. If you have questions, please call your primary care clinic.  If you do not have a primary care provider, please call [phone] and they will assist you.        Care  EveryWhere ID     This is your Care EveryWhere ID. This could be used by other organizations to access your [facility] medical records  [id number]         Blood Pressure from Last 3 Encounters:   11/10/17 110/66   09/15/17 100/66   09/23/16 102/62    Weight from Last 3 Encounters:   11/10/17 69.4 kg (153 lb)   09/15/17 69.9 kg (154 lb)   09/23/16 67.6 kg (149 lb)              We Performed the Following     HC PT EVAL, LOW COMPLEXITY     [facility] INITIAL EVAL REPORT     NEUROMUSCULAR RE-EDUCATION        Primary Care Provider Office Phone # Fax #    Mary Langston [phone]       [address] [place] [address]        Equal Access to Services     NAVNEET SIMS : Hadii aad ku hadasho Sojean claude, waaxda luqadaha, qaybta kaalmada adeegyada, paola morales . So [facility] [phone].    ATENCIÓN: Si habla español, tiene a sousa disposición servicios gratuitos de asistencia lingüística. [facility] [phone].    We comply with applicable federal civil rights laws and [place] laws. We do not discriminate on the basis of race, color, national origin, age, disability, sex, sexual orientation, or gender identity.            Thank you!     Thank you for choosing [facility]  for your care. Our goal is always to provide you with excellent care. Hearing back from our patients is one way we can continue to improve our services. Please take a few minutes to complete the written survey that you may receive in the mail after your visit with us. Thank you!             Your Updated Medication List - Protect others around you: Learn how to safely use, store and throw away your medicines at www.disposemymeds.org.          This list is accurate as of: 11/17/17  5:16 PM.  Always use your most recent med list.                   Brand Name Dispense Instructions for use Diagnosis    levothyroxine 125 MCG tablet    SYNTHROID/LEVOTHROID    90 tablet    Take 1 tablet  (125 mcg) by mouth daily    Hypothyroidism, unspecified

## 2017-11-17 NOTE — PROGRESS NOTES
Subjective:  Physical Therapy Initial Evaluation   11/17/17   Precautions/Restrictions/MD instructions: PT eval and treat   Therapist Impression:   Pt is a 45 y/o female, with 3 month history of left sided neck/upper back pain. Pt presents with pain, decreased ROM/mobilty, poor posture and decreased strength. These impairments limit their ability to sit prolonged periods of time, and turning head to look in blind spot. Skilled PT services necessary in order to reduce impairments and improve independent function.    Subjective:   Chief Complaint: Neck and upper back pain (left sided).  DOI/onset: 8/15/17 DOS: none  Location: neck and upper back (L>R)  Quality: sharp/shooting at times  Frequency: intermittent  Radiates: none to note  Pain scale: Rest 3/10 Activity 8/10   Sleeping: not affected   Exacerbated by: turning head (to look in blind spot)  Relieved by: massage/pressure  Progression: same  Previous Treatment: none Effect of prior treatment: na   PMH and/or surgical history: none    Imaging: none    Occupation:   Job duties: computer work, sitting    Current HEP/exercise regimen: hiking, taking care of kids Patient's goals: painfree in daily tasks  Medications: thyroid med  General health as reported by patient: good  Return to MD: as needed  Red Flags: none to note    HPI                    Objective:  CERVICAL:    Posture: forward head and rounded shoulders    AROM: (Major, Moderate, Minimal or Nil loss)  Movement Loss Simeon Mod Min Nil Pain   Protrusion    X Mid back pain   Flexion    X Mid back pain   Retraction   X  none   Extension   X  Slight neck pain   Left Rotation   X  none   Right Rotation   X  Stiffer than left    Left Side Bending   X  none   Right Side bending   X  Stiffer than left     SPECIAL TESTS   R L   Spurlings/Quadrant - -   Distraction + +     Palpation: Very tender and trigger point noted in L and R upper trap and levator scap    System    Physical Exam    General      ROS    Assessment/Plan:      Patient is a 46 year old female with cervical and thoracic complaints.    Patient has the following significant findings with corresponding treatment plan.                Diagnosis 1:  Neck and upper back pain  Pain -  hot/cold therapy, manual therapy, splint/taping/bracing/orthotics, self management, education, directional preference exercise and home program  Decreased ROM/flexibility - manual therapy and therapeutic exercise  Decreased joint mobility - manual therapy and therapeutic exercise  Inflammation - cold therapy and self management/home program  Impaired muscle performance - neuro re-education  Decreased function - therapeutic activities  Impaired posture - neuro re-education  Instability -  Therapeutic Activity  Therapeutic Exercise    Therapy Evaluation Codes:   1) History comprised of:   Personal factors that impact the plan of care:      None.    Comorbidity factors that impact the plan of care are:      None.     Medications impacting care: None.  2) Examination of Body Systems comprised of:   Body structures and functions that impact the plan of care:      Cervical spine and Thoracic Spine.   Activity limitations that impact the plan of care are:      Driving and Sitting.  3) Clinical presentation characteristics are:   Stable/Uncomplicated.  4) Decision-Making    Low complexity using standardized patient assessment instrument and/or measureable assessment of functional outcome.  Cumulative Therapy Evaluation is: Low complexity.    Previous and current functional limitations:  (See Goal Flow Sheet for this information)    Short term and Long term goals: (See Goal Flow Sheet for this information)     Communication ability:  Patient appears to be able to clearly communicate and understand verbal and written communication and follow directions correctly.  Treatment Explanation - The following has been discussed with the patient:   RX ordered/plan of care  Anticipated  outcomes  Possible risks and side effects  This patient would benefit from PT intervention to resume normal activities.   Rehab potential is good.    Frequency:  1 X week, once daily  Duration:  for 8 weeks  Discharge Plan:  Achieve all LTG.  Independent in home treatment program.  Reach maximal therapeutic benefit.    Please refer to the daily flowsheet for treatment today, total treatment time and time spent performing 1:1 timed codes.

## 2017-12-01 ENCOUNTER — THERAPY VISIT (OUTPATIENT)
Dept: PHYSICAL THERAPY | Facility: CLINIC | Age: 47
End: 2017-12-01
Payer: COMMERCIAL

## 2017-12-01 DIAGNOSIS — M54.2 CERVICALGIA: ICD-10-CM

## 2017-12-01 PROCEDURE — 97140 MANUAL THERAPY 1/> REGIONS: CPT | Mod: GP | Performed by: PHYSICAL THERAPIST

## 2017-12-01 PROCEDURE — 97112 NEUROMUSCULAR REEDUCATION: CPT | Mod: GP | Performed by: PHYSICAL THERAPIST

## 2017-12-08 ENCOUNTER — THERAPY VISIT (OUTPATIENT)
Dept: PHYSICAL THERAPY | Facility: CLINIC | Age: 47
End: 2017-12-08
Payer: COMMERCIAL

## 2017-12-08 DIAGNOSIS — M54.2 CERVICALGIA: ICD-10-CM

## 2017-12-08 PROCEDURE — 97112 NEUROMUSCULAR REEDUCATION: CPT | Mod: GP | Performed by: PHYSICAL THERAPIST

## 2017-12-08 PROCEDURE — 97140 MANUAL THERAPY 1/> REGIONS: CPT | Mod: GP | Performed by: PHYSICAL THERAPIST

## 2018-05-16 DIAGNOSIS — E03.9 HYPOTHYROIDISM: ICD-10-CM

## 2018-05-16 RX ORDER — LEVOTHYROXINE SODIUM 125 UG/1
TABLET ORAL
Qty: 90 TABLET | Refills: 0 | Status: SHIPPED | OUTPATIENT
Start: 2018-05-16 | End: 2018-07-30

## 2018-05-16 NOTE — TELEPHONE ENCOUNTER
"Requested Prescriptions   Pending Prescriptions Disp Refills     levothyroxine (SYNTHROID/LEVOTHROID) 125 MCG tablet [Pharmacy Med Name: LEVOTHYROXINE SODIUM 125MCG TABS] 90 tablet 0      Last Written Prescription Date:  9.15.17  Last Fill Quantity: 90,  # refills: 1   Last Office Visit: 11/10/2017   Future Office Visit:      Sig: TAKE ONE TABLET BY MOUTH EVERY DAY    Thyroid Protocol Passed    5/16/2018  9:23 AM       Passed - Patient is 12 years or older       Passed - Recent (12 mo) or future (30 days) visit within the authorizing provider's specialty    Patient had office visit in the last 12 months or has a visit in the next 30 days with authorizing provider or within the authorizing provider's specialty.  See \"Patient Info\" tab in inbasket, or \"Choose Columns\" in Meds & Orders section of the refill encounter.           Passed - Normal TSH on file in past 12 months    Recent Labs   Lab Test  09/15/17   0859   TSH  2.61             Passed - No active pregnancy on record    If patient is pregnant or has had a positive pregnancy test, please check TSH.         Passed - No positive pregnancy test in past 12 months    If patient is pregnant or has had a positive pregnancy test, please check TSH.              "

## 2018-05-16 NOTE — TELEPHONE ENCOUNTER
Reason for Call:  Other prescription    Detailed comments: The patient says she really needs this medication (levothyroxine) as she has two pills left.     Phone Number Patient can be reached at: Cell number on file:    Telephone Information:   Mobile [phone]     Best Time: Anytime    Can we leave a detailed message on this number? YES    Call taken on 5/16/2018 at 8:49 AM by Terri Morfin

## 2018-05-16 NOTE — TELEPHONE ENCOUNTER
Prescription approved per [facility] Refill Protocol.      Kat Lambert, BS, RN, N  [facility]) [phone]

## 2018-05-16 NOTE — TELEPHONE ENCOUNTER
"Requested Prescriptions   Pending Prescriptions Disp Refills     levothyroxine (SYNTHROID/LEVOTHROID) 125 MCG tablet [Pharmacy Med Name: LEVOTHYROXINE SODIUM 125MCG TABS] 90 tablet 0    Last Written Prescription Date:  9.15.17  Last Fill Quantity: 90,  # refills: 1   Last Office Visit: 11/10/2017   Future Office Visit:      Sig: TAKE ONE TABLET BY MOUTH EVERY DAY    Thyroid Protocol Passed    5/16/2018  8:50 AM       Passed - Patient is 12 years or older       Passed - Recent (12 mo) or future (30 days) visit within the authorizing provider's specialty    Patient had office visit in the last 12 months or has a visit in the next 30 days with authorizing provider or within the authorizing provider's specialty.  See \"Patient Info\" tab in inbasket, or \"Choose Columns\" in Meds & Orders section of the refill encounter.           Passed - Normal TSH on file in past 12 months    Recent Labs   Lab Test  09/15/17   0859   TSH  2.61             Passed - No active pregnancy on record    If patient is pregnant or has had a positive pregnancy test, please check TSH.         Passed - No positive pregnancy test in past 12 months    If patient is pregnant or has had a positive pregnancy test, please check TSH.            "

## 2018-07-18 ENCOUNTER — RADIANT APPOINTMENT (OUTPATIENT)
Dept: MAMMOGRAPHY | Facility: CLINIC | Age: 48
End: 2018-07-18
Payer: COMMERCIAL

## 2018-07-18 DIAGNOSIS — Z12.31 VISIT FOR SCREENING MAMMOGRAM: ICD-10-CM

## 2018-07-18 PROCEDURE — 77067 SCR MAMMO BI INCL CAD: CPT | Mod: TC

## 2018-07-26 ENCOUNTER — OFFICE VISIT (OUTPATIENT)
Dept: FAMILY MEDICINE | Facility: CLINIC | Age: 48
End: 2018-07-26
Payer: COMMERCIAL

## 2018-07-26 VITALS — DIASTOLIC BLOOD PRESSURE: 76 MMHG | SYSTOLIC BLOOD PRESSURE: 113 MMHG | OXYGEN SATURATION: 98 % | HEART RATE: 69 BPM

## 2018-07-26 DIAGNOSIS — L81.4 SOLAR LENTIGO: ICD-10-CM

## 2018-07-26 DIAGNOSIS — L91.8 INFLAMED SKIN TAG: ICD-10-CM

## 2018-07-26 DIAGNOSIS — Z86.018 HISTORY OF DYSPLASTIC NEVUS: ICD-10-CM

## 2018-07-26 DIAGNOSIS — Z12.83 SKIN CANCER SCREENING: Primary | ICD-10-CM

## 2018-07-26 DIAGNOSIS — D22.9 MULTIPLE MELANOCYTIC NEVI: ICD-10-CM

## 2018-07-26 PROCEDURE — 99213 OFFICE O/P EST LOW 20 MIN: CPT | Mod: 25 | Performed by: FAMILY MEDICINE

## 2018-07-26 PROCEDURE — 11200 RMVL SKIN TAGS UP TO&INC 15: CPT | Performed by: FAMILY MEDICINE

## 2018-07-26 NOTE — PROGRESS NOTES
[facility] - PRIMARY CARE SKIN    CC : skin cancer screening (full-body)  SUBJECTIVE:                                                    Mildred Tellez is a 47 year old female who presents to clinic today with  for a full-body skin exam because of her history of dysplastic nevus(i).    Bothersome lesions noticed by the patient or other skin concerns :  Issue One : She has noticed a mole or skin tag on the right chest which has been enlarging to 3-4 times its previous size over the last year. It has caught on clothing and is irritated by bra.        Personal history of skin cancer : NO - history of mild dysplastic nevi on left abdomen and left lower back.  Rosacea : YES  Family history of skin cancer : NO.    Sun Exposure History  Previous history of significant sun exposure: YES  Sunscreen Use : YES.    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    Refer to electronic medical record (EMR) for past medical history and medications.    INTEGUMENTARY/SKIN: POSITIVE for changing lesion  ROS : 14 point review of systems was negative except the symptoms listed above in the HPI.    This document serves as a record of the services and decisions personally performed and made by Amelie Feliz MD. It was created on her behalf by Aubrey Walsh, a trained medical scribe.  The creation of this document is based on the scribe's personal observations and the provider's statements to the medical scribe.  Aubrey Walsh, July 26, 2018 12:01 PM      OBJECTIVE:                                                    GENERAL: healthy, alert and no distress  SKIN: [place] Skin Type - III.  This patient was examined from the top of the head to the bottom of the feet  including scalp, face, neck, back, chest, breasts, buttocks, both arms, both legs, both hands, both feet, all 10 fingers and all 10 toes. The dermatoscope was used to help evaluate pigmented lesions.  Skin Pertinent Findings:  Scalp : clear    Face : scattered solar  lentigo.    Chest, arms, back , legs- multiple brown macules of various sizes and shapes      Buttocks : few benign macules, brown        Significant Findings:  2 mm in size, brown, pedunculated, benign-appearing skin tag(s)  Name : Lesion Removal  Indication : Irritated/inflamed benign skin tags.  Location(s) : axilla - x5.  Completed by : Amelie Feliz MD  Note : Prior to treatment, discussed the risk of pain, blistering, infection, scarring, hypopigmentation, hyperpigmentation, and recurrence or need for retreatment. Benefits of treatment and alternative treatments were also discussed.     The lesion(s) were removed or treated with liquid nitrogen via cryactweezers after alcohol prep    Patient tolerated the procedure well and left in good condition.  Tissue sample sent in : No.  Total number of lesions treated : 5.    Diagnostic Test Results:  none           ASSESSMENT:                                                      Encounter Diagnoses   Name Primary?     Skin cancer screening Yes     History of dysplastic nevus      Inflamed skin tag      Solar lentigo      Multiple melanocytic nevi          PLAN:                                                    Patient Instructions   FUTURE APPOINTMENTS  Follow up in 1 year(s) for a full-body skin cancer screening.    CRYOTHERAPY POST-TREATMENT CARE INSTRUCTIONS  Liquid nitrogen is mildly uncomfortable when applied to the skin, but the discomfort rapidly subsides.    Post-Treatment:  You may experience burning and/or stinging immediately following the procedure. The discomfort from the procedure may persist over the next 12-24 hours. The area treated will look pinker and slightly swollen before the healing process begins. You may also notice redness, swelling, tenderness, weeping and crusts or scabs. Healing time is approximately 10-14 days.    Blister - You may or may notice blistering from the freezing. If you develop an uncomfortable blister from today's treatment,  you may gently puncture this with a needle that has been cleaned with alcohol. However, do not remove the protective skin layer of the blister.    Scab - After a few days, you may notice scaliness or scab formation. Do not pick at the scabs because this may cause slower healing and a permanent scar.    The skin may appear temporarily darker at the treatment site, but this usually fades over a period of months, provided that the area is protected from the sun.    Care of the areas treated:    Wash the area with a mild cleanser.    Gently pat dry.    Do not rub.     Keep protected from the sun during the healing process and for a full year following treatment as the skin continues to remodel during this time.    If you experience dryness or persistent burning, you may use Vaseline or Aquaphor ointment sparingly.    Do not use Neosporin, as many people eventually develop a medication allergy, that can easily be confused with an infection, to Neomycin.    Return if:  If there is any concern that the lesion has persisted, make an appointment for a re-check. Healing time does vary depending on your individual healing process and the area of the body treated. Most patients will be healed in one month.    Signs of Infection:  Thankfully this is rare. However if you notice persistent colored drainage, increasing pain, fever or other signs of infection, please call back at [phone].    SUN PROTECTION INSTRUCTIONS  Sun damage can lead to skin cancer and premature aging of the skin.      The best way to protect from sun damage to your skin is to avoid the sun during peak hours (10 am - 2 pm) even on overcast days.      Use UPF sun-protective clothing, which while more expensive initially provides longer lasting coverage without having to worry about remembering to re-apply.  1. Wear a wide-brimmed hat and sunglasses.   2. Wear sun-protective clothing.  [employer] and other [employer] make sun protective clothing  "that are stylish, comfortable and cool. [employer] and other [employer] make UV arm sleeves suitable for golfing, gardening and other activities.      Sunscreen instructions:  1. Use sunscreens with Zinc Oxide, Titanium Dioxide or Avobenzone to protect from UVA rays.  2. Use SPF 30-50+ to protect from UVB rays.  3. Re-apply every 2 hours even if water resistant.  4. Apply on your face every day even when cloudy and even in the winter. UVA \"aging rays\" penetrate window glass and are just as strong in the winter as in the summer.    Product Recommendations:    Good examples include: Blue Lizard, EltaMD, Solbar    Good daily moisturizers with SPF: Vanicream, CeraVe.    For sensitive skin, consider : SkinMedica Essential Defense Mineral Shield Broad Spectrum SPF 35      Never use tanning beds. Tanning beds are associated with much higher risks of skin cancer.    All tanning damages the skin. Aim for ivory skin year round and you will have less trouble with your skin in years to come. There is no merit in getting \"a base tan\" before a warm weather vacation, as any tanning indicates your body's response to sun damage.    Stop smoking. Smokers have higher rates of skin cancer and also have premature skin wrinkling.    FYI  You should use about 3 tablespoons of sunscreen to protect your whole body. Thus a typical eight ounce bottle of sunscreen should last 4 applications. Remember, that the SPF rating is compromised if you don t apply enough. Most people only apply 1/2 - 1/3 of the amount they need. Also don t forget areas such as your ears, feet, upper back and harder to reach places. Keep in mind that these amounts should be increased for larger body sizes.    Sunscreens with titanium dioxide and/or zinc oxide in the active ingredients are physical blockers as opposed to chemical blockers. Chemical-free sunscreens should not irritate the skin.    Spray-on sunscreens may be used for touch-up application only, not as a " base layer. Also, use with caution around small children due to inhalation risk.    Avoid retinyl palmitate products.    Avoid combination products that include both sunscreen and insect repellant, as sunscreen should be applied every 2 hours, but insect repellant should not be applied as frequently.    SPF means sun protection factor, which is just the degree to which the sunscreen can protect against UVB rays. There is no rating system for UVA rays. SPF is calculated as the time skin will burn when sunscreen is applied vs. skin without sunscreen.    Water resistant sunscreens should be re-applied every 1-2 hours.    For more information:  http://www.skincancer.org/prevention/sun-protection/sunscreen/sunscreens-safe-and-effective    SKIN CANCER SELF-EXAM INSTRUCTIONS  Check every month in the mirror or with a household member. Be aware of any changes, especially bleeding or tenderness. Also, make sure to check your nails for color changes after removal of nail polish.    For melanoma, check for:  A - Asymmetry. One half unlike the other half.  B - Border. Irregular, scalloped, ragged, notched, blurred or poorly defined borders.  C - Color. Color variations from one area to another, with shades of tan, brown and/or black present. Sometimes white, red or blue.  D - Diameter. Greater than 6 mm (about the size of a pencil eraser). Any new growth of a mole should be concerning and be evaluated.  E - Evolving. A mole or skin lesion that looks different from the rest or is changing in size, shape or color.    For basal cell carcinoma and squamous cell carcinoma, check for:    Sores, shiny bumps, nodules, scaly lesions, or wart-like growths that are itchy, tender, crusting, scabbing, eroding, oozing or bleeding.    Open sores/wounds or reddish/irritated areas that do not heal within 2-3 weeks.    Scar-like areas that are white, yellow or waxy in color.    The patient was counseled about sunscreens and sun avoidance. The  patient was counseled to check the skin regularly and report any lesion that is new, changing, itching, scabbing, bleeding or otherwise bothersome. The patient was discharged ambulatory and in stable condition.      PROCEDURES:                                                    None.    TT : 25 minutes.  CT : 15 minutes.      The information in this document, created by the medical scribe for me, accurately reflects the services I personally performed and the decisions made by me. I have reviewed and approved this document for accuracy prior to leaving the patient care area.  Amelie Feliz MD July 26, 2018 12:01 PM  [facility]

## 2018-07-26 NOTE — MR AVS SNAPSHOT
After Visit Summary   7/26/2018    Mildred Tellez    MRN: [id number]           Patient Information     Date Of Birth          1970        Visit Information        Provider Department      7/26/2018 12:20 PM Jenny Feliz MD [facility]        Today's Diagnoses     Skin cancer screening    -  1    History of dysplastic nevus        Inflamed skin tag          Care Instructions    FUTURE APPOINTMENTS  Follow up in 1 year(s) for a full-body skin cancer screening.    CRYOTHERAPY POST-TREATMENT CARE INSTRUCTIONS  Liquid nitrogen is mildly uncomfortable when applied to the skin, but the discomfort rapidly subsides.    Post-Treatment:  You may experience burning and/or stinging immediately following the procedure. The discomfort from the procedure may persist over the next 12-24 hours. The area treated will look pinker and slightly swollen before the healing process begins. You may also notice redness, swelling, tenderness, weeping and crusts or scabs. Healing time is approximately 10-14 days.    Blister - You may or may notice blistering from the freezing. If you develop an uncomfortable blister from today's treatment, you may gently puncture this with a needle that has been cleaned with alcohol. However, do not remove the protective skin layer of the blister.    Scab - After a few days, you may notice scaliness or scab formation. Do not pick at the scabs because this may cause slower healing and a permanent scar.    The skin may appear temporarily darker at the treatment site, but this usually fades over a period of months, provided that the area is protected from the sun.    Care of the areas treated:    Wash the area with a mild cleanser.    Gently pat dry.    Do not rub.     Keep protected from the sun during the healing process and for a full year following treatment as the skin continues to remodel during this time.    If you experience dryness or persistent burning,  "you may use Vaseline or Aquaphor ointment sparingly.    Do not use Neosporin, as many people eventually develop a medication allergy, that can easily be confused with an infection, to Neomycin.    Return if:  If there is any concern that the lesion has persisted, make an appointment for a re-check. Healing time does vary depending on your individual healing process and the area of the body treated. Most patients will be healed in one month.    Signs of Infection:  Thankfully this is rare. However if you notice persistent colored drainage, increasing pain, fever or other signs of infection, please call back at [phone].    SUN PROTECTION INSTRUCTIONS  Sun damage can lead to skin cancer and premature aging of the skin.      The best way to protect from sun damage to your skin is to avoid the sun during peak hours (10 am - 2 pm) even on overcast days.      Use UPF sun-protective clothing, which while more expensive initially provides longer lasting coverage without having to worry about remembering to re-apply.  1. Wear a wide-brimmed hat and sunglasses.   2. Wear sun-protective clothing.  [employer] and other [employer] make sun protective clothing that are stylish, comfortable and cool. [employer] and other [employer] make UV arm sleeves suitable for golfing, gardening and other activities.      Sunscreen instructions:  1. Use sunscreens with Zinc Oxide, Titanium Dioxide or Avobenzone to protect from UVA rays.  2. Use SPF 30-50+ to protect from UVB rays.  3. Re-apply every 2 hours even if water resistant.  4. Apply on your face every day even when cloudy and even in the winter. UVA \"aging rays\" penetrate window glass and are just as strong in the winter as in the summer.    Product Recommendations:    Good examples include: Blue Lizard, EltaMD, Solbar    Good daily moisturizers with SPF: Vanicream, CeraVe.    For sensitive skin, consider : SkinMedica Essential Defense Mineral Shield Broad Spectrum SPF " "35      Never use tanning beds. Tanning beds are associated with much higher risks of skin cancer.    All tanning damages the skin. Aim for ivory skin year round and you will have less trouble with your skin in years to come. There is no merit in getting \"a base tan\" before a warm weather vacation, as any tanning indicates your body's response to sun damage.    Stop smoking. Smokers have higher rates of skin cancer and also have premature skin wrinkling.    FYI  You should use about 3 tablespoons of sunscreen to protect your whole body. Thus a typical eight ounce bottle of sunscreen should last 4 applications. Remember, that the SPF rating is compromised if you don t apply enough. Most people only apply 1/2 - 1/3 of the amount they need. Also don t forget areas such as your ears, feet, upper back and harder to reach places. Keep in mind that these amounts should be increased for larger body sizes.    Sunscreens with titanium dioxide and/or zinc oxide in the active ingredients are physical blockers as opposed to chemical blockers. Chemical-free sunscreens should not irritate the skin.    Spray-on sunscreens may be used for touch-up application only, not as a base layer. Also, use with caution around small children due to inhalation risk.    Avoid retinyl palmitate products.    Avoid combination products that include both sunscreen and insect repellant, as sunscreen should be applied every 2 hours, but insect repellant should not be applied as frequently.    SPF means sun protection factor, which is just the degree to which the sunscreen can protect against UVB rays. There is no rating system for UVA rays. SPF is calculated as the time skin will burn when sunscreen is applied vs. skin without sunscreen.    Water resistant sunscreens should be re-applied every 1-2 hours.    For more information:  http://www.skincancer.org/prevention/sun-protection/sunscreen/sunscreens-safe-and-effective    SKIN CANCER SELF-EXAM " INSTRUCTIONS  Check every month in the mirror or with a household member. Be aware of any changes, especially bleeding or tenderness. Also, make sure to check your nails for color changes after removal of nail polish.    For melanoma, check for:  A - Asymmetry. One half unlike the other half.  B - Border. Irregular, scalloped, ragged, notched, blurred or poorly defined borders.  C - Color. Color variations from one area to another, with shades of tan, brown and/or black present. Sometimes white, red or blue.  D - Diameter. Greater than 6 mm (about the size of a pencil eraser). Any new growth of a mole should be concerning and be evaluated.  E - Evolving. A mole or skin lesion that looks different from the rest or is changing in size, shape or color.    For basal cell carcinoma and squamous cell carcinoma, check for:    Sores, shiny bumps, nodules, scaly lesions, or wart-like growths that are itchy, tender, crusting, scabbing, eroding, oozing or bleeding.    Open sores/wounds or reddish/irritated areas that do not heal within 2-3 weeks.    Scar-like areas that are white, yellow or waxy in color.          Follow-ups after your visit        Your next 10 appointments already scheduled     Sep 17, 2018  4:00 PM CDT   [facility]    [address] [place] [id number] [phone]              Who to contact     If you have questions or need follow up information about today's clinic visit or your schedule please contact [facility] directly at [phone].  Normal or non-critical lab and imaging results will be communicated to you by MyChart, letter or phone within 4 business days after the clinic has received the results. If you do not hear from us within 7 days, please contact the clinic through MyChart or phone. If you have a critical or abnormal lab result, we will notify you by phone as  soon as possible.  Submit refill requests through [employer] or call your pharmacy and they will forward the refill request to us. Please allow 3 business days for your refill to be completed.          Additional Information About Your Visit        [employer] Information     [employer] gives you secure access to your electronic health record. If you see a primary care provider, you can also send messages to your care team and make appointments. If you have questions, please call your primary care clinic.  If you do not have a primary care provider, please call [phone] and they will assist you.        Care EveryWhere ID     This is your Care EveryWhere ID. This could be used by other organizations to access your [facility] medical records  [id number]        Your Vitals Were     Pulse Pulse Oximetry                69 98%           Blood Pressure from Last 3 Encounters:   07/26/18 113/76   11/10/17 110/66   09/15/17 100/66    Weight from Last 3 Encounters:   11/10/17 153 lb (69.4 kg)   09/15/17 154 lb (69.9 kg)   09/23/16 149 lb (67.6 kg)              Today, you had the following     No orders found for display       Primary Care Provider Office Phone # Fax #    Mary Langston [phone]       [address] [place] [id number]        Equal Access to Services     NAVNEET SIMS : Hadii violetta ku hadasho Soomaali, waaxda luqadaha, qaybta kaalmada adeegyada, paola melton. So [facility] [phone].    ATENCIÓN: Si habla español, tiene a sousa disposición servicios gratuitos de asistencia lingüística. ame al [phone].    We comply with applicable federal civil rights laws and [place] laws. We do not discriminate on the basis of race, color, national origin, age, disability, sex, sexual orientation, or gender identity.            Thank you!     Thank you for choosing [facility]  for your care. Our goal is always to provide you with excellent care.  Hearing back from our patients is one way we can continue to improve our services. Please take a few minutes to complete the written survey that you may receive in the mail after your visit with us. Thank you!             Your Updated Medication List - Protect others around you: Learn how to safely use, store and throw away your medicines at www.disposemymeds.org.          This list is accurate as of 7/26/18 12:35 PM.  Always use your most recent med list.                   Brand Name Dispense Instructions for use Diagnosis    levothyroxine 125 MCG tablet    SYNTHROID/LEVOTHROID    90 tablet    TAKE ONE TABLET BY MOUTH EVERY DAY    Hypothyroidism

## 2018-07-26 NOTE — LETTER
7/26/2018         RE: Mildred Tellez  [address] [facility] [address]        Dear Colleague,    Thank you for referring your patient, Mildred Tellez, to [facility]. Please see a copy of my visit note below.    [facility] - PRIMARY CARE SKIN    CC : skin cancer screening (full-body)  SUBJECTIVE:                                                    Mildred Tellez is a 47 year old female who presents to clinic today with  for a full-body skin exam because of her history of dysplastic nevus(i).    Bothersome lesions noticed by the patient or other skin concerns :  Issue One : She has noticed a mole or skin tag on the right chest which has been enlarging to 3-4 times its previous size over the last year. It has caught on clothing and is irritated by bra.        Personal history of skin cancer : NO - history of mild dysplastic nevi on left abdomen and left lower back.  Rosacea : YES  Family history of skin cancer : NO.    Sun Exposure History  Previous history of significant sun exposure: YES  Sunscreen Use : YES.    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    Refer to electronic medical record (EMR) for past medical history and medications.    INTEGUMENTARY/SKIN: POSITIVE for changing lesion  ROS : 14 point review of systems was negative except the symptoms listed above in the HPI.    This document serves as a record of the services and decisions personally performed and made by Amelie Feliz MD. It was created on her behalf by Aubrey Walsh, a trained medical scribe.  The creation of this document is based on the scribe's personal observations and the provider's statements to the medical scribe.  Aubrey Walsh, July 26, 2018 12:01 PM      OBJECTIVE:                                                    GENERAL: healthy, alert and no distress  SKIN: [place] Skin Type - III.  This patient was examined from the top of the head to the bottom of the feet  including scalp, face,  neck, back, chest, breasts, buttocks, both arms, both legs, both hands, both feet, all 10 fingers and all 10 toes. The dermatoscope was used to help evaluate pigmented lesions.  Skin Pertinent Findings:  Scalp : clear    Face : scattered solar lentigo.    Chest, arms, back , legs- multiple brown macules of various sizes and shapes      Buttocks : few benign macules, brown        Significant Findings:  2 mm in size, brown, pedunculated, benign-appearing skin tag(s)  Name : Lesion Removal  Indication : Irritated/inflamed benign skin tags.  Location(s) : axilla - x5.  Completed by : Amelie Feliz MD  Note : Prior to treatment, discussed the risk of pain, blistering, infection, scarring, hypopigmentation, hyperpigmentation, and recurrence or need for retreatment. Benefits of treatment and alternative treatments were also discussed.     The lesion(s) were removed or treated with liquid nitrogen via cryactweezers after alcohol prep    Patient tolerated the procedure well and left in good condition.  Tissue sample sent in : No.  Total number of lesions treated : 5.    Diagnostic Test Results:  none           ASSESSMENT:                                                      Encounter Diagnoses   Name Primary?     Skin cancer screening Yes     History of dysplastic nevus      Inflamed skin tag      Solar lentigo      Multiple melanocytic nevi          PLAN:                                                    Patient Instructions   FUTURE APPOINTMENTS  Follow up in 1 year(s) for a full-body skin cancer screening.    CRYOTHERAPY POST-TREATMENT CARE INSTRUCTIONS  Liquid nitrogen is mildly uncomfortable when applied to the skin, but the discomfort rapidly subsides.    Post-Treatment:  You may experience burning and/or stinging immediately following the procedure. The discomfort from the procedure may persist over the next 12-24 hours. The area treated will look pinker and slightly swollen before the healing process begins. You may  also notice redness, swelling, tenderness, weeping and crusts or scabs. Healing time is approximately 10-14 days.    Blister - You may or may notice blistering from the freezing. If you develop an uncomfortable blister from today's treatment, you may gently puncture this with a needle that has been cleaned with alcohol. However, do not remove the protective skin layer of the blister.    Scab - After a few days, you may notice scaliness or scab formation. Do not pick at the scabs because this may cause slower healing and a permanent scar.    The skin may appear temporarily darker at the treatment site, but this usually fades over a period of months, provided that the area is protected from the sun.    Care of the areas treated:    Wash the area with a mild cleanser.    Gently pat dry.    Do not rub.     Keep protected from the sun during the healing process and for a full year following treatment as the skin continues to remodel during this time.    If you experience dryness or persistent burning, you may use Vaseline or Aquaphor ointment sparingly.    Do not use Neosporin, as many people eventually develop a medication allergy, that can easily be confused with an infection, to Neomycin.    Return if:  If there is any concern that the lesion has persisted, make an appointment for a re-check. Healing time does vary depending on your individual healing process and the area of the body treated. Most patients will be healed in one month.    Signs of Infection:  Thankfully this is rare. However if you notice persistent colored drainage, increasing pain, fever or other signs of infection, please call back at [phone].    SUN PROTECTION INSTRUCTIONS  Sun damage can lead to skin cancer and premature aging of the skin.      The best way to protect from sun damage to your skin is to avoid the sun during peak hours (10 am - 2 pm) even on overcast days.      Use UPF sun-protective clothing, which while more expensive  "initially provides longer lasting coverage without having to worry about remembering to re-apply.  1. Wear a wide-brimmed hat and sunglasses.   2. Wear sun-protective clothing.  [employer] and other [employer] make sun protective clothing that are stylish, comfortable and cool. [employer] and other [employer] make UV arm sleeves suitable for golfing, gardening and other activities.      Sunscreen instructions:  1. Use sunscreens with Zinc Oxide, Titanium Dioxide or Avobenzone to protect from UVA rays.  2. Use SPF 30-50+ to protect from UVB rays.  3. Re-apply every 2 hours even if water resistant.  4. Apply on your face every day even when cloudy and even in the winter. UVA \"aging rays\" penetrate window glass and are just as strong in the winter as in the summer.    Product Recommendations:    Good examples include: Blue Lizard, EltaMD, Solbar    Good daily moisturizers with SPF: Vanicream, CeraVe.    For sensitive skin, consider : SkinMedica Essential Defense Mineral Shield Broad Spectrum SPF 35      Never use tanning beds. Tanning beds are associated with much higher risks of skin cancer.    All tanning damages the skin. Aim for ivory skin year round and you will have less trouble with your skin in years to come. There is no merit in getting \"a base tan\" before a warm weather vacation, as any tanning indicates your body's response to sun damage.    Stop smoking. Smokers have higher rates of skin cancer and also have premature skin wrinkling.    FYI  You should use about 3 tablespoons of sunscreen to protect your whole body. Thus a typical eight ounce bottle of sunscreen should last 4 applications. Remember, that the SPF rating is compromised if you don t apply enough. Most people only apply 1/2 - 1/3 of the amount they need. Also don t forget areas such as your ears, feet, upper back and harder to reach places. Keep in mind that these amounts should be increased for larger body sizes.    Sunscreens with " titanium dioxide and/or zinc oxide in the active ingredients are physical blockers as opposed to chemical blockers. Chemical-free sunscreens should not irritate the skin.    Spray-on sunscreens may be used for touch-up application only, not as a base layer. Also, use with caution around small children due to inhalation risk.    Avoid retinyl palmitate products.    Avoid combination products that include both sunscreen and insect repellant, as sunscreen should be applied every 2 hours, but insect repellant should not be applied as frequently.    SPF means sun protection factor, which is just the degree to which the sunscreen can protect against UVB rays. There is no rating system for UVA rays. SPF is calculated as the time skin will burn when sunscreen is applied vs. skin without sunscreen.    Water resistant sunscreens should be re-applied every 1-2 hours.    For more information:  http://www.skincancer.org/prevention/sun-protection/sunscreen/sunscreens-safe-and-effective    SKIN CANCER SELF-EXAM INSTRUCTIONS  Check every month in the mirror or with a household member. Be aware of any changes, especially bleeding or tenderness. Also, make sure to check your nails for color changes after removal of nail polish.    For melanoma, check for:  A - Asymmetry. One half unlike the other half.  B - Border. Irregular, scalloped, ragged, notched, blurred or poorly defined borders.  C - Color. Color variations from one area to another, with shades of tan, brown and/or black present. Sometimes white, red or blue.  D - Diameter. Greater than 6 mm (about the size of a pencil eraser). Any new growth of a mole should be concerning and be evaluated.  E - Evolving. A mole or skin lesion that looks different from the rest or is changing in size, shape or color.    For basal cell carcinoma and squamous cell carcinoma, check for:    Sores, shiny bumps, nodules, scaly lesions, or wart-like growths that are itchy, tender, crusting,  scabbing, eroding, oozing or bleeding.    Open sores/wounds or reddish/irritated areas that do not heal within 2-3 weeks.    Scar-like areas that are white, yellow or waxy in color.    The patient was counseled about sunscreens and sun avoidance. The patient was counseled to check the skin regularly and report any lesion that is new, changing, itching, scabbing, bleeding or otherwise bothersome. The patient was discharged ambulatory and in stable condition.      PROCEDURES:                                                    None.    TT : 25 minutes.  CT : 15 minutes.      The information in this document, created by the medical scribe for me, accurately reflects the services I personally performed and the decisions made by me. I have reviewed and approved this document for accuracy prior to leaving the patient care area.  Amelie Feliz MD July 26, 2018 12:01 PM  [facility]    Again, thank you for allowing me to participate in the care of your patient.        Sincerely,        Jenny Feliz MD

## 2018-07-26 NOTE — PATIENT INSTRUCTIONS
FUTURE APPOINTMENTS  Follow up in 1 year(s) for a full-body skin cancer screening.    CRYOTHERAPY POST-TREATMENT CARE INSTRUCTIONS  Liquid nitrogen is mildly uncomfortable when applied to the skin, but the discomfort rapidly subsides.    Post-Treatment:  You may experience burning and/or stinging immediately following the procedure. The discomfort from the procedure may persist over the next 12-24 hours. The area treated will look pinker and slightly swollen before the healing process begins. You may also notice redness, swelling, tenderness, weeping and crusts or scabs. Healing time is approximately 10-14 days.    Blister - You may or may notice blistering from the freezing. If you develop an uncomfortable blister from today's treatment, you may gently puncture this with a needle that has been cleaned with alcohol. However, do not remove the protective skin layer of the blister.    Scab - After a few days, you may notice scaliness or scab formation. Do not pick at the scabs because this may cause slower healing and a permanent scar.    The skin may appear temporarily darker at the treatment site, but this usually fades over a period of months, provided that the area is protected from the sun.    Care of the areas treated:    Wash the area with a mild cleanser.    Gently pat dry.    Do not rub.     Keep protected from the sun during the healing process and for a full year following treatment as the skin continues to remodel during this time.    If you experience dryness or persistent burning, you may use Vaseline or Aquaphor ointment sparingly.    Do not use Neosporin, as many people eventually develop a medication allergy, that can easily be confused with an infection, to Neomycin.    Return if:  If there is any concern that the lesion has persisted, make an appointment for a re-check. Healing time does vary depending on your individual healing process and the area of the body treated. Most patients will be healed  "in one month.    Signs of Infection:  Thankfully this is rare. However if you notice persistent colored drainage, increasing pain, fever or other signs of infection, please call back at [phone].    SUN PROTECTION INSTRUCTIONS  Sun damage can lead to skin cancer and premature aging of the skin.      The best way to protect from sun damage to your skin is to avoid the sun during peak hours (10 am - 2 pm) even on overcast days.      Use UPF sun-protective clothing, which while more expensive initially provides longer lasting coverage without having to worry about remembering to re-apply.  1. Wear a wide-brimmed hat and sunglasses.   2. Wear sun-protective clothing.  [employer] and other [employer] make sun protective clothing that are stylish, comfortable and cool. S[employer] and other [place] make UV arm sleeves suitable for golfing, gardening and other activities.      Sunscreen instructions:  1. Use sunscreens with Zinc Oxide, Titanium Dioxide or Avobenzone to protect from UVA rays.  2. Use SPF 30-50+ to protect from UVB rays.  3. Re-apply every 2 hours even if water resistant.  4. Apply on your face every day even when cloudy and even in the winter. UVA \"aging rays\" penetrate window glass and are just as strong in the winter as in the summer.    Product Recommendations:    Good examples include: Blue Benedict, EltaMD, Solbar    Good daily moisturizers with SPF: Vanicream, CeraVe.    For sensitive skin, consider : SkinMedica Essential Defense Mineral Shield Broad Spectrum SPF 35      Never use tanning beds. Tanning beds are associated with much higher risks of skin cancer.    All tanning damages the skin. Aim for ivory skin year round and you will have less trouble with your skin in years to come. There is no merit in getting \"a base tan\" before a warm weather vacation, as any tanning indicates your body's response to sun damage.    Stop smoking. Smokers have higher rates of skin cancer and also " have premature skin wrinkling.    FYI  You should use about 3 tablespoons of sunscreen to protect your whole body. Thus a typical eight ounce bottle of sunscreen should last 4 applications. Remember, that the SPF rating is compromised if you don t apply enough. Most people only apply 1/2 - 1/3 of the amount they need. Also don t forget areas such as your ears, feet, upper back and harder to reach places. Keep in mind that these amounts should be increased for larger body sizes.    Sunscreens with titanium dioxide and/or zinc oxide in the active ingredients are physical blockers as opposed to chemical blockers. Chemical-free sunscreens should not irritate the skin.    Spray-on sunscreens may be used for touch-up application only, not as a base layer. Also, use with caution around small children due to inhalation risk.    Avoid retinyl palmitate products.    Avoid combination products that include both sunscreen and insect repellant, as sunscreen should be applied every 2 hours, but insect repellant should not be applied as frequently.    SPF means sun protection factor, which is just the degree to which the sunscreen can protect against UVB rays. There is no rating system for UVA rays. SPF is calculated as the time skin will burn when sunscreen is applied vs. skin without sunscreen.    Water resistant sunscreens should be re-applied every 1-2 hours.    For more information:  http://www.skincancer.org/prevention/sun-protection/sunscreen/sunscreens-safe-and-effective    SKIN CANCER SELF-EXAM INSTRUCTIONS  Check every month in the mirror or with a household member. Be aware of any changes, especially bleeding or tenderness. Also, make sure to check your nails for color changes after removal of nail polish.    For melanoma, check for:  A - Asymmetry. One half unlike the other half.  B - Border. Irregular, scalloped, ragged, notched, blurred or poorly defined borders.  C - Color. Color variations from one area to another,  with shades of tan, brown and/or black present. Sometimes white, red or blue.  D - Diameter. Greater than 6 mm (about the size of a pencil eraser). Any new growth of a mole should be concerning and be evaluated.  E - Evolving. A mole or skin lesion that looks different from the rest or is changing in size, shape or color.    For basal cell carcinoma and squamous cell carcinoma, check for:    Sores, shiny bumps, nodules, scaly lesions, or wart-like growths that are itchy, tender, crusting, scabbing, eroding, oozing or bleeding.    Open sores/wounds or reddish/irritated areas that do not heal within 2-3 weeks.    Scar-like areas that are white, yellow or waxy in color.

## 2018-07-30 DIAGNOSIS — E03.9 HYPOTHYROIDISM: ICD-10-CM

## 2018-07-30 RX ORDER — LEVOTHYROXINE SODIUM 125 UG/1
TABLET ORAL
Qty: 90 TABLET | Refills: 0 | Status: SHIPPED | OUTPATIENT
Start: 2018-07-30 | End: 2018-09-17

## 2018-07-30 NOTE — TELEPHONE ENCOUNTER
"Requested Prescriptions   Pending Prescriptions Disp Refills     levothyroxine (SYNTHROID/LEVOTHROID) 125 MCG tablet [Pharmacy Med Name: LEVOTHYROXINE SODIUM 125MCG TABS]  Last Written Prescription Date:  5/16/18  Last Fill Quantity: 90 tablet,  # refills: 0   Last office visit: 7/26/2018 with prescribing provider:  Scherman   Future Office Visit:   Next 5 appointments (look out 90 days)     Sep 17, 2018  4:00 PM CDT   PHYSICAL with Mary Langston MD   [facility]    [address] [place] [id number] [phone]                  90 tablet 0     Sig: TAKE ONE TABLET BY MOUTH EVERY DAY    Thyroid Protocol Passed    7/30/2018 11:37 AM       Passed - Patient is 12 years or older       Passed - Recent (12 mo) or future (30 days) visit within the authorizing provider's specialty    Patient had office visit in the last 12 months or has a visit in the next 30 days with authorizing provider or within the authorizing provider's specialty.  See \"Patient Info\" tab in inbasket, or \"Choose Columns\" in Meds & Orders section of the refill encounter.           Passed - Normal TSH on file in past 12 months    Recent Labs   Lab Test  09/15/17   0859   TSH  2.61             Passed - No active pregnancy on record    If patient is pregnant or has had a positive pregnancy test, please check TSH.         Passed - No positive pregnancy test in past 12 months    If patient is pregnant or has had a positive pregnancy test, please check TSH.            "

## 2018-07-30 NOTE — TELEPHONE ENCOUNTER
Prescription approved per [facility]ill Protocol.  Britany Garrett RN  [facility]

## 2018-09-17 ENCOUNTER — OFFICE VISIT (OUTPATIENT)
Dept: FAMILY MEDICINE | Facility: CLINIC | Age: 48
End: 2018-09-17
Payer: COMMERCIAL

## 2018-09-17 VITALS
BODY MASS INDEX: 27.25 KG/M2 | OXYGEN SATURATION: 100 % | HEIGHT: 63 IN | HEART RATE: 85 BPM | WEIGHT: 153.8 LBS | SYSTOLIC BLOOD PRESSURE: 114 MMHG | DIASTOLIC BLOOD PRESSURE: 62 MMHG | TEMPERATURE: 98.2 F

## 2018-09-17 DIAGNOSIS — K43.2 INCISIONAL HERNIA, WITHOUT OBSTRUCTION OR GANGRENE: ICD-10-CM

## 2018-09-17 DIAGNOSIS — E78.5 HYPERLIPIDEMIA LDL GOAL <160: ICD-10-CM

## 2018-09-17 DIAGNOSIS — K92.89 GAS BLOAT SYNDROME: ICD-10-CM

## 2018-09-17 DIAGNOSIS — D22.4 COMPOUND NEVUS OF NECK: ICD-10-CM

## 2018-09-17 DIAGNOSIS — Z00.01 ENCOUNTER FOR ROUTINE ADULT HEALTH EXAMINATION WITH ABNORMAL FINDINGS: Primary | ICD-10-CM

## 2018-09-17 DIAGNOSIS — Z12.11 SPECIAL SCREENING FOR MALIGNANT NEOPLASMS, COLON: ICD-10-CM

## 2018-09-17 DIAGNOSIS — E03.9 HYPOTHYROIDISM, UNSPECIFIED TYPE: ICD-10-CM

## 2018-09-17 DIAGNOSIS — Z23 NEED FOR PROPHYLACTIC VACCINATION AND INOCULATION AGAINST INFLUENZA: ICD-10-CM

## 2018-09-17 PROCEDURE — 99396 PREV VISIT EST AGE 40-64: CPT | Mod: 25 | Performed by: FAMILY MEDICINE

## 2018-09-17 PROCEDURE — 87624 HPV HI-RISK TYP POOLED RSLT: CPT | Performed by: FAMILY MEDICINE

## 2018-09-17 PROCEDURE — 90471 IMMUNIZATION ADMIN: CPT | Performed by: FAMILY MEDICINE

## 2018-09-17 PROCEDURE — 99214 OFFICE O/P EST MOD 30 MIN: CPT | Mod: 25 | Performed by: FAMILY MEDICINE

## 2018-09-17 PROCEDURE — G0145 SCR C/V CYTO,THINLAYER,RESCR: HCPCS | Performed by: FAMILY MEDICINE

## 2018-09-17 PROCEDURE — 90686 IIV4 VACC NO PRSV 0.5 ML IM: CPT | Performed by: FAMILY MEDICINE

## 2018-09-17 RX ORDER — LEVOTHYROXINE SODIUM 125 UG/1
125 TABLET ORAL DAILY
Qty: 90 TABLET | Refills: 3 | Status: SHIPPED | OUTPATIENT
Start: 2018-09-17 | End: 2018-11-20 | Stop reason: ALTCHOICE

## 2018-09-17 NOTE — PATIENT INSTRUCTIONS
"Try to take BEANO to help with gassiness and bloating ? From vegetables.   Continue taking probiotic daily.     Characteristics and sources of common FODMAPS:     F  - Fermentable    O - Oligosaccharides    Fructans, galacto/oligosaccharides  Wheat, barley, rye, onion,dominik, white part of spring onion (scallion),             Garlic, shallots, artichokes, beetroot, fennel, peas, chicory, pistachio,             Cashews, legumes, lentils, and chickpeas    D - Disaccharides  Lactose      Milk, custard, ice cream, and yogurt    M - Monosaccharides \"Free Fructose\" (fructose > glucose)  Apples, pears, mangoes, cherries, watermelon, asparagus, sugar snap peas,             Honey, high-fructose corn syrup  A - And    P - Polyols    Sorbitol, mannitol, maltitol, & xylitol   Apples, pears, apricots, cherries, nectarines, peaches, plums, watermelon,              Mushrooms, cauliflower, artificial sweeteners in gum, candy and drinks    FODMAPS: fermentable oligosaccharides, disaccharieds, monosaccharides, and polyols    Adapted from [employer]: American Journal of Gastroenterology.  Daniel SH, Sindi MC, Marek [place]. Short-chain carbohydrates and functional gastrointestinal disorders.  Am J Gastroenterol 2013; 108: 707.  Copyright 2013.  Www.nature.com/ajg         Preventive Health Recommendations  Female Ages 40 to 49    Yearly exam:     See your health care provider every year in order to  1. Review health changes.   2. Discuss preventive care.    3. Review your medicines if your doctor prescribed any.      Get a Pap test every three years (unless you have an abnormal result and your provider advises testing more often).      If you get Pap tests with HPV test, you only need to test every 5 years, unless you have an abnormal result. You do not need a Pap test if your uterus was removed (hysterectomy) and you have not had cancer.      You should be tested each year for STDs (sexually transmitted diseases), if you're " at risk.     Ask your doctor if you should have a mammogram.      Have a colonoscopy (test for colon cancer) if someone in your family has had colon cancer or polyps before age 50.       Have a cholesterol test every 5 years.       Have a diabetes test (fasting glucose) after age 45. If you are at risk for diabetes, you should have this test every 3 years.    Shots: Get a flu shot each year. Get a tetanus shot every 10 years.     Nutrition:     Eat at least 5 servings of fruits and vegetables each day.    Eat whole-grain bread, whole-wheat pasta and brown rice instead of white grains and rice.    Get adequate Calcium and Vitamin D.      Lifestyle    Exercise at least 150 minutes a week (an average of 30 minutes a day, 5 days a week). This will help you control your weight and prevent disease.    Limit alcohol to one drink per day.    No smoking.     Wear sunscreen to prevent skin cancer.    See your dentist every six months for an exam and cleaning.               Thank you for choosing [facility]  for your Health Care. It was a pleasure seeing you at your visit today. Please contact us with any questions or concerns you may have.                   Mary Langston MD                                  To reach your [facility] care team after hours call:   [phone]    Our clinic hours are:     Monday- 7:30 am - 7:00 pm                             Tuesday through Friday- 7:30 am - 5:00 pm                                        Saturday- 8:00 am - 12:00 pm                  Phone:  [phone]    Our pharmacy hours are:     Monday  8:00 am to 7:00 pm      Tuesday through Friday 8:00am to 6:00pm                        Saturday - 9:00 am to 1:00 pm      Sunday : Closed.              Phone:  [phone]      There is also information available at our web site:  [email or web address]    If your provider ordered any lab tests and you do not receive the results within 10 business  days, please call the clinic.    If you need a medication refill please contact your pharmacy.  Please allow 2 business days for your refill to be completed.    Our clinic offers telephone visits and e visits.  Please ask one of your team members to explain more.      Use [employer] (secure email communication and access to your chart) to send your primary care provider a message or make an appointment. Ask someone on your Team how to sign up for [employer].

## 2018-09-17 NOTE — PROGRESS NOTES

## 2018-09-17 NOTE — PROGRESS NOTES
SUBJECTIVE:   CC: Mildred Tellez is an 47 year old woman who presents for preventive health visit.     Healthy Habits:    Do you get at least three servings of calcium containing foods daily (dairy, green leafy vegetables, etc.)? yes    Amount of exercise or daily activities, outside of work: 30 minutes during week, more during weekend     Problems taking medications regularly No    Medication side effects: No    Have you had an eye exam in the past two years? yes    Do you see a dentist twice per year? yes    Do you have sleep apnea, excessive snoring or daytime drowsiness?no      Hypothyroidism Follow-up:       Since last visit, patient describes the following symptoms: dry skin  TSH   Date Value Ref Range Status   09/15/2017 2.61 0.40 - 4.00 mU/L Final           Today's PHQ-2 Score:   PHQ-2 ( 1999 Pfizer) 9/17/2018 9/15/2017   Q1: Little interest or pleasure in doing things 0 0   Q2: Feeling down, depressed or hopeless 0 0   PHQ-2 Score 0 0   Q1: Little interest or pleasure in doing things - -   Q2: Feeling down, depressed or hopeless - -   PHQ-2 Score - -       Abuse: Current or Past(Physical, Sexual or Emotional)- No  Do you feel safe in your environment - Yes    Social History   Substance Use Topics     Smoking status: Never Smoker     Smokeless tobacco: Never Used     Alcohol use 0.0 oz/week     0 Standard drinks or equivalent per week      Comment: rare     If you drink alcohol do you typically have >3 drinks per day or >7 drinks per week? No                     Reviewed orders with patient.  Reviewed health maintenance and updated orders accordingly - Yes  Labs reviewed in EPIC  BP Readings from Last 3 Encounters:   09/17/18 114/62   07/26/18 113/76   11/10/17 110/66    Wt Readings from Last 3 Encounters:   09/17/18 153 lb 12.8 oz (69.8 kg)   11/10/17 153 lb (69.4 kg)   09/15/17 154 lb (69.9 kg)                  Patient Active Problem List   Diagnosis     Hypothyroidism, unspecified type     Rosacea      Hyperlipidemia LDL goal <160     RLQ abdominal pain - when getting up from sitting in area of appy     Abnormal Pap smear, can't excl hi gd sq intraepithelial lesion (ASC-H)     Mastodynia- left breast     Compound nevus of neck - with dysplastic features - right neck 2013     ASCUS pap -  2013 - saw Dr. Beltran ob/[facility] [place]      History of dysplastic nevus     Coxsackie virus infection - severe - summer of  - hands and feet totally peeled and oral lesions very painful     Cervicalgia     Past Surgical History:   Procedure Laterality Date     C APPENDECTOMY         Social History   Substance Use Topics     Smoking status: Never Smoker     Smokeless tobacco: Never Used     Alcohol use 0.0 oz/week     0 Standard drinks or equivalent per week      Comment: rare     Family History   Problem Relation Age of Onset     Hypertension Mother      Hypertension Maternal Grandmother      Diabetes Paternal Grandmother       of complications fr. diabetes     C.A.D. Father      on blood thinners for his PAD     Cardiovascular Father      peripheral vascular disease- very heavy smoker      Lung Cancer Father      Breast Cancer Other      paternal great aunt         Current Outpatient Prescriptions   Medication Sig Dispense Refill     levothyroxine (SYNTHROID/LEVOTHROID) 125 MCG tablet TAKE ONE TABLET BY MOUTH EVERY DAY 90 tablet 0     No Known Allergies  Recent Labs   Lab Test  09/15/17   0900  09/15/17   0859  09/10/16   1002   07/06/15   1046   LDL   --   117*  129*   --   119   HDL   --   41*  39*   --   45*   TRIG   --   101  78   --   95   ALT  24   --   22   --   21   CR  0.75   --   0.70   --   0.69   GFRESTIMATED  83   --   >90  Non  GFR Calc     --   >90  Non  GFR Calc     GFRESTBLACK  >90   --   >90   GFR Calc     --   >90   GFR Calc     POTASSIUM  4.2   --   3.9   --   4.4   TSH   --   2.61  1.20   < >  2.88    < > = values in this  interval not displayed.        Patient under age 50, mutual decision reflected in health maintenance.      Pertinent mammograms are reviewed under the imaging tab.  History of abnormal Pap smear: YES - updated in Problem List and Health Maintenance accordingly  PAP / HPV Latest Ref Rng & Units 9/10/2016 2015 10/10/2014   PAP - NIL ASC-US(A) NIL   HPV 16 DNA NEG Negative Negative -   HPV 18 DNA NEG Negative Negative -   OTHER HR HPV NEG Negative Negative -     Reviewed and updated as needed this visit by clinical staff  Tobacco  Allergies  Meds  Problems  Med Hx  Surg Hx  Fam Hx  Soc Hx          Reviewed and updated as needed this visit by Provider  Problems        Obstetric History       T2      L2     SAB0   TAB0   Ectopic0   Multiple0   Live Births0       # Outcome Date GA Lbr Rudi/2nd Weight Sex Delivery Anes PTL Lv   2 Term            1 Term               Obstetric Comments   First pregnancy - Moiz - developed chorioamnionitis & postpartum endometritis despite IV abx from prolonged rupture = 30 hrs.  Also had partially retained placenta that she passed on her own 3 days s/p delivery.       No problems with second = Sherie.         ROS: getting some perimenopausal symptoms - shorter time between menses and some increased cramping.   Getting some gassiness and bloating with higher fiber foods. - more with vegetables.   CONSTITUTIONAL: NEGATIVE for fever, chills, change in weight  INTEGUMENTARU/SKIN: NEGATIVE for worrisome rashes, moles or lesions  EYES: NEGATIVE for vision changes or irritation  ENT: NEGATIVE for ear, mouth and throat problems  RESP: NEGATIVE for significant cough or SOB  BREAST: NEGATIVE for masses, tenderness or discharge  CV: NEGATIVE for chest pain, palpitations or peripheral edema  GI: NEGATIVE for nausea, abdominal pain, heartburn, or change in bowel habits  : NEGATIVE for unusual urinary or vaginal symptoms. Periods are regular.  MUSCULOSKELETAL: NEGATIVE for  "significant arthralgias or myalgia  NEURO: NEGATIVE for weakness, dizziness or paresthesias  ENDOCRINE: NEGATIVE for temperature intolerance, skin/hair changes  HEME/ALLERGY/IMMUNE: NEGATIVE for bleeding problems  PSYCHIATRIC: NEGATIVE for changes in mood or affect    OBJECTIVE:   /62  Pulse 85  Temp 98.2  F (36.8  C) (Tympanic)  Ht 5' 3\" (1.6 m)  Wt 153 lb 12.8 oz (69.8 kg)  SpO2 100%  Breastfeeding? No  BMI 27.24 kg/m2.   EXAM:  GENERAL: healthy, alert and no distress  EYES: Eyes grossly normal to inspection, PERRL and conjunctivae and sclerae normal  HENT: ear canals and TM's normal, nose and mouth without ulcers or lesions  NECK: no adenopathy, no asymmetry, masses, or scars and thyroid normal to palpation  RESP: lungs clear to auscultation - no rales, rhonchi or wheezes  BREAST: normal without masses, tenderness or nipple discharge and no palpable axillary masses or adenopathy  CV: regular rate and rhythm, normal S1 S2, no S3 or S4, no murmur, click or rub, no peripheral edema and peripheral pulses strong  ABDOMEN: soft, tender in the area of her previous appy scar with valsalva and 1/2 sit up = some weakness of the area around that incision - especially medially , no hepatosplenomegaly, no masses and bowel sounds normal   (female): normal female external genitalia, normal urethral meatus, vaginal mucosa pink, moist, well rugated, and normal cervix/adnexa/uterus without masses or discharge  MS: no gross musculoskeletal defects noted, no edema  SKIN: no suspicious lesions or rashes  NEURO: Normal strength and tone, mentation intact and speech normal  PSYCH: mentation appears normal, affect normal/bright  LYMPH: no cervical, supraclavicular, axillary, or inguinal adenopathy.     [facility]     ASSESSMENT/PLAN:       ICD-10-CM    1. Encounter for routine adult health examination with abnormal findings Z00.01 CBC with platelets differential   2. Hypothyroidism, unspecified type E03.9 T4 " "free     TSH     T3 total     levothyroxine (SYNTHROID/LEVOTHROID) 125 MCG tablet   3. Gas bloat syndrome K92.89    4. Compound nevus of neck - with dysplastic features - right neck 11/2013 D22.4    5. Incisional hernia, without obstruction or gangrene K43.2 GENERAL SURG ADULT REFERRAL   6. Hyperlipidemia LDL goal <160 - needs lab f/u - uncertain control  E78.5 Lipid panel reflex to direct LDL Fasting     Comprehensive metabolic panel   7. Need for prophylactic vaccination and inoculation against influenza Z23 FLU VACCINE, SPLIT VIRUS, IM (QUADRIVALENT) [36861]- >3 YRS     Vaccine Administration, Initial [54646]   8. Special screening for malignant neoplasms, colon Z12.11 Fecal colorectal cancer screen FIT     Pap imaged thin layer screen with HPV - recommended age 30 - 65 years (select HPV order below)     HPV High Risk Types DNA Cervical     Try to take BEANO to help with gassiness and bloating ? From vegetables.   Characteristics and sources of common FODMAPS:     F  - Fermentable    O - Oligosaccharides    Fructans, galacto/oligosaccharides  Wheat, barley, rye, onion,dominik, white part of spring onion (scallion),             Garlic, shallots, artichokes, beetroot, fennel, peas, chicory, pistachio,             Cashews, legumes, lentils, and chickpeas    D - Disaccharides  Lactose      Milk, custard, ice cream, and yogurt    M - Monosaccharides \"Free Fructose\" (fructose > glucose)  Apples, pears, mangoes, cherries, watermelon, asparagus, sugar snap peas,             Honey, high-fructose corn syrup  A - And    P - Polyols    Sorbitol, mannitol, maltitol, & xylitol   Apples, pears, apricots, cherries, nectarines, peaches, plums, watermelon,              Mushrooms, cauliflower, artificial sweeteners in gum, candy and drinks    FODMAPS: fermentable oligosaccharides, disaccharieds, monosaccharides, and polyols    Adapted from [employer]: American Journal of Gastroenterology.  Fredy WRIGHT, Sindi RODRIGUEZ, Marek " "[place]. Short-chain carbohydrates and functional gastrointestinal disorders.  Am J Gastroenterol 2013; 108: 707.  Copyright 2013.  Www.nature.com/ajg   COUNSELING:    Reviewed preventive health counseling, as reflected in patient instructions    BP Readings from Last 1 Encounters:   09/17/18 114/62     Estimated body mass index is 27.24 kg/(m^2) as calculated from the following:    Height as of this encounter: 5' 3\" (1.6 m).    Weight as of this encounter: 153 lb 12.8 oz (69.8 kg).      Weight management plan: see next   Establish an exercise regimen. Activity goal: 45 minutes 5 days a week. New exercise routine: cardiovascular workout on exercise equipment, walking and weightlifting. Diet regimen was discussed and plan is self-directed dieting: reduce calories, reduce portions, reduce processed  carbs, increase fruits/vegetables and avoid sweets and supervised diet program. Avoid artificial sweeteners and \"diet\" drinks and sodas.        reports that she has never smoked. She has never used smokeless tobacco.       Counseling Resources:  ATP IV Guidelines  Pooled Cohorts Equation Calculator  Breast Cancer Risk Calculator  FRAX Risk Assessment  ICSI Preventive Guidelines  Dietary Guidelines for Americans, 2010  USDA's MyPlate  ASA Prophylaxis  Lung CA Screening    Mary Langston MD  [facility]    "

## 2018-09-17 NOTE — MR AVS SNAPSHOT
"              After Visit Summary   9/17/2018    Mildred Tellez    MRN: [id number]           Patient Information     Date Of Birth          1970        Visit Information        Provider Department      9/17/2018 4:00 PM Mary Langston MD [facility]        Today's Diagnoses     Encounter for routine adult health examination with abnormal findings    -  1    Hypothyroidism, unspecified type        Need for prophylactic vaccination and inoculation against influenza        Hyperlipidemia LDL goal <160        Compound nevus of neck - with dysplastic features - right neck 11/2013        Special screening for malignant neoplasms, colon        Incisional hernia, without obstruction or gangrene          Care Instructions    Try to take BEANO to help with gassiness and bloating ? From vegetables.   Continue taking probiotic daily.     Characteristics and sources of common FODMAPS:     F  - Fermentable    O - Oligosaccharides    Fructans, galacto/oligosaccharides  Wheat, barley, rye, onion,dominik, white part of spring onion (scallion),             Garlic, shallots, artichokes, beetroot, fennel, peas, chicory, pistachio,             Cashews, legumes, lentils, and chickpeas    D - Disaccharides  Lactose      Milk, custard, ice cream, and yogurt    M - Monosaccharides \"Free Fructose\" (fructose > glucose)  Apples, pears, mangoes, cherries, watermelon, asparagus, sugar snap peas,             Honey, high-fructose corn syrup  A - And    P - Polyols    Sorbitol, mannitol, maltitol, & xylitol   Apples, pears, apricots, cherries, nectarines, peaches, plums, watermelon,              Mushrooms, cauliflower, artificial sweeteners in gum, candy and drinks    FODMAPS: fermentable oligosaccharides, disaccharieds, monosaccharides, and polyols    Adapted from [employer]: American Journal of Gastroenterology.  Fredy SH, Sindi MC, Marek FL. Short-chain carbohydrates and functional gastrointestinal " disorders.  Am J Gastroenterol 2013; 108: 707.  Copyright 2013.  Www.nature.com/ajg         Preventive Health Recommendations  Female Ages 40 to 49    Yearly exam:     See your health care provider every year in order to  1. Review health changes.   2. Discuss preventive care.    3. Review your medicines if your doctor prescribed any.      Get a Pap test every three years (unless you have an abnormal result and your provider advises testing more often).      If you get Pap tests with HPV test, you only need to test every 5 years, unless you have an abnormal result. You do not need a Pap test if your uterus was removed (hysterectomy) and you have not had cancer.      You should be tested each year for STDs (sexually transmitted diseases), if you're at risk.     Ask your doctor if you should have a mammogram.      Have a colonoscopy (test for colon cancer) if someone in your family has had colon cancer or polyps before age 50.       Have a cholesterol test every 5 years.       Have a diabetes test (fasting glucose) after age 45. If you are at risk for diabetes, you should have this test every 3 years.    Shots: Get a flu shot each year. Get a tetanus shot every 10 years.     Nutrition:     Eat at least 5 servings of fruits and vegetables each day.    Eat whole-grain bread, whole-wheat pasta and brown rice instead of white grains and rice.    Get adequate Calcium and Vitamin D.      Lifestyle    Exercise at least 150 minutes a week (an average of 30 minutes a day, 5 days a week). This will help you control your weight and prevent disease.    Limit alcohol to one drink per day.    No smoking.     Wear sunscreen to prevent skin cancer.    See your dentist every six months for an exam and cleaning.               Thank you for choosing [facility]  for your Health Care. It was a pleasure seeing you at your visit today. Please contact us with any questions or concerns you may have.                   Mary  SANTANA Langston MD                                  To reach your [facility] team after hours call:   [phone]    Our clinic hours are:     Monday- 7:30 am - 7:00 pm                             Tuesday through Friday- 7:30 am - 5:00 pm                                        Saturday- 8:00 am - 12:00 pm                  Phone:  [phone]    Our pharmacy hours are:     Monday  8:00 am to 7:00 pm      Tuesday through Friday 8:00am to 6:00pm                        Saturday - 9:00 am to 1:00 pm      Sunday : Closed.              Phone:  [phone]      There is also information available at our web site:  [email or web address]    If your provider ordered any lab tests and you do not receive the results within 10 business days, please call the clinic.    If you need a medication refill please contact your pharmacy.  Please allow 2 business days for your refill to be completed.    Our clinic offers telephone visits and e visits.  Please ask one of your team members to explain more.      Use [employer] (secure email communication and access to your chart) to send your primary care provider a message or make an appointment. Ask someone on your Team how to sign up for [employer] REVAt.                       Follow-ups after your visit        Additional Services     GENERAL SURG ADULT REFERRAL       Your provider has referred you to: [facility]: [facility] [phone]   [email or web address]    Please be aware that coverage of these services is subject to the terms and limitations of your health insurance plan.  Call member services at your health plan with any benefit or coverage questions.      Please bring the following with you to your appointment:    (1) Any X-Rays, CTs or MRIs which have been performed.  Contact the facility where they were done to arrange for  prior to your scheduled appointment.   (2) List of current medications   (3) This  referral request   (4) Any documents/labs given to you for this referral                  Follow-up notes from your care team     Return in about 1 year (around 9/17/2019) for Physical Exam, Lab Work, Routine Visit and recheck on thyroid .      Future tests that were ordered for you today     Open Future Orders        Priority Expected Expires Ordered    Fecal colorectal cancer screen FIT Routine 9/29/2018 9/29/2018 9/17/2018    Lipid panel reflex to direct LDL Fasting Routine 9/29/2018 12/17/2018 9/17/2018    T4 free Routine 9/29/2018 12/17/2018 9/17/2018    TSH Routine 9/29/2018 12/17/2018 9/17/2018    T3 total Routine 9/29/2018 12/17/2018 9/17/2018    CBC with platelets differential Routine 9/29/2018 12/17/2018 9/17/2018    Comprehensive metabolic panel Routine 9/29/2018 12/17/2018 9/17/2018            Who to contact     If you have questions or need follow up information about today's clinic visit or your schedule please contact [facility] directly at [phone].  Normal or non-critical lab and imaging results will be communicated to you by Golden Reviewshart, letter or phone within 4 business days after the clinic has received the results. If you do not hear from us within 7 days, please contact the clinic through [employer] or phone. If you have a critical or abnormal lab result, we will notify you by phone as soon as possible.  Submit refill requests through [employer] or call your pharmacy and they will forward the refill request to us. Please allow 3 business days for your refill to be completed.          Additional Information About Your Visit        [employer] Information     [employer] gives you secure access to your electronic health record. If you see a primary care provider, you can also send messages to your care team and make appointments. If you have questions, please call your primary care clinic.  If you do not have a primary care provider, please call [phone] and they will assist you.       "  Care EveryWhere ID     This is your Care EveryWhere ID. This could be used by other organizations to access your [facility] medical records  [id number]        Your Vitals Were     Pulse Temperature Height Last Period Pulse Oximetry Breastfeeding?    85 98.2  F (36.8  C) (Tympanic) 5' 3\" (1.6 m) 08/27/2018 (Exact Date) 100% No    BMI (Body Mass Index)                   27.24 kg/m2            Blood Pressure from Last 3 Encounters:   09/17/18 114/62   07/26/18 113/76   11/10/17 110/66    Weight from Last 3 Encounters:   09/17/18 153 lb 12.8 oz (69.8 kg)   11/10/17 153 lb (69.4 kg)   09/15/17 154 lb (69.9 kg)              We Performed the Following     FLU VACCINE, SPLIT VIRUS, IM (QUADRIVALENT) [87528]- >3 YRS     GENERAL SURG ADULT REFERRAL     HPV High Risk Types DNA Cervical     Pap imaged thin layer screen with HPV - recommended age 30 - 65 years (select HPV order below)     Vaccine Administration, Initial [00145]          Today's Medication Changes          These changes are accurate as of 9/17/18  5:33 PM.  If you have any questions, ask your nurse or doctor.               These medicines have changed or have updated prescriptions.        Dose/Directions    levothyroxine 125 MCG tablet   Commonly known as:  SYNTHROID/LEVOTHROID   This may have changed:  See the new instructions.   Used for:  Hypothyroidism, unspecified type   Changed by:  Mary Langston MD        Dose:  125 mcg   Take 1 tablet (125 mcg) by mouth daily   Quantity:  90 tablet   Refills:  3            Where to get your medicines      These medications were sent to [facility] - [address] [address]     Phone:  [phone]     levothyroxine 125 MCG tablet                Primary Care Provider Office Phone # Fax #    Mary Langston [phone]       [address] [place] [address]        Equal Access to Services     " NAVNEET SIMS : Hadii aad claudine camilla Tan, wabrianda luqadaha, qaybta katoshia ashlychrissusy, paola bernalajitakshat melton. So [facility] [phone].    ATENCIÓN: Si habla español, tiene a sousa disposición servicios gratuitos de asistencia lingüística. Llame al [phone].    We comply with applicable federal civil rights laws and [place] laws. We do not discriminate on the basis of race, color, national origin, age, disability, sex, sexual orientation, or gender identity.            Thank you!     Thank you for choosing [facility]  for your care. Our goal is always to provide you with excellent care. Hearing back from our patients is one way we can continue to improve our services. Please take a few minutes to complete the written survey that you may receive in the mail after your visit with us. Thank you!             Your Updated Medication List - Protect others around you: Learn how to safely use, store and throw away your medicines at www.disposemymeds.org.          This list is accurate as of 9/17/18  5:33 PM.  Always use your most recent med list.                   Brand Name Dispense Instructions for use Diagnosis    levothyroxine 125 MCG tablet    SYNTHROID/LEVOTHROID    90 tablet    Take 1 tablet (125 mcg) by mouth daily    Hypothyroidism, unspecified type

## 2018-09-20 LAB
COPATH REPORT: NORMAL
PAP: NORMAL

## 2018-09-21 LAB
FINAL DIAGNOSIS: NORMAL
HPV HR 12 DNA CVX QL NAA+PROBE: NEGATIVE
HPV16 DNA SPEC QL NAA+PROBE: NEGATIVE
HPV18 DNA SPEC QL NAA+PROBE: NEGATIVE
SPECIMEN DESCRIPTION: NORMAL
SPECIMEN SOURCE CVX/VAG CYTO: NORMAL

## 2018-10-01 ENCOUNTER — OFFICE VISIT (OUTPATIENT)
Dept: URGENT CARE | Facility: URGENT CARE | Age: 48
End: 2018-10-01
Payer: COMMERCIAL

## 2018-10-01 VITALS
DIASTOLIC BLOOD PRESSURE: 68 MMHG | OXYGEN SATURATION: 98 % | HEART RATE: 72 BPM | SYSTOLIC BLOOD PRESSURE: 104 MMHG | TEMPERATURE: 98.3 F

## 2018-10-01 DIAGNOSIS — E03.9 HYPOTHYROIDISM, UNSPECIFIED TYPE: ICD-10-CM

## 2018-10-01 DIAGNOSIS — B02.9 HERPES ZOSTER WITHOUT COMPLICATION: Primary | ICD-10-CM

## 2018-10-01 DIAGNOSIS — E78.5 HYPERLIPIDEMIA LDL GOAL <160: ICD-10-CM

## 2018-10-01 DIAGNOSIS — Z00.01 ENCOUNTER FOR ROUTINE ADULT HEALTH EXAMINATION WITH ABNORMAL FINDINGS: ICD-10-CM

## 2018-10-01 LAB
ALBUMIN SERPL-MCNC: 3.9 G/DL (ref 3.4–5)
ALP SERPL-CCNC: 56 U/L (ref 40–150)
ALT SERPL W P-5'-P-CCNC: 27 U/L (ref 0–50)
ANION GAP SERPL CALCULATED.3IONS-SCNC: 11 MMOL/L (ref 3–14)
AST SERPL W P-5'-P-CCNC: 14 U/L (ref 0–45)
BASOPHILS # BLD AUTO: 0.1 10E9/L (ref 0–0.2)
BASOPHILS NFR BLD AUTO: 0.6 %
BILIRUB SERPL-MCNC: 0.4 MG/DL (ref 0.2–1.3)
BUN SERPL-MCNC: 17 MG/DL (ref 7–30)
CALCIUM SERPL-MCNC: 8.6 MG/DL (ref 8.5–10.1)
CHLORIDE SERPL-SCNC: 105 MMOL/L (ref 94–109)
CHOLEST SERPL-MCNC: 188 MG/DL
CO2 SERPL-SCNC: 23 MMOL/L (ref 20–32)
CREAT SERPL-MCNC: 0.77 MG/DL (ref 0.52–1.04)
DIFFERENTIAL METHOD BLD: NORMAL
EOSINOPHIL # BLD AUTO: 0.1 10E9/L (ref 0–0.7)
EOSINOPHIL NFR BLD AUTO: 1.2 %
ERYTHROCYTE [DISTWIDTH] IN BLOOD BY AUTOMATED COUNT: 12.6 % (ref 10–15)
GFR SERPL CREATININE-BSD FRML MDRD: 80 ML/MIN/1.7M2
GLUCOSE SERPL-MCNC: 89 MG/DL (ref 70–99)
HCT VFR BLD AUTO: 42.3 % (ref 35–47)
HDLC SERPL-MCNC: 40 MG/DL
HGB BLD-MCNC: 13.9 G/DL (ref 11.7–15.7)
LDLC SERPL CALC-MCNC: 128 MG/DL
LYMPHOCYTES # BLD AUTO: 2.2 10E9/L (ref 0.8–5.3)
LYMPHOCYTES NFR BLD AUTO: 27 %
MCH RBC QN AUTO: 31.7 PG (ref 26.5–33)
MCHC RBC AUTO-ENTMCNC: 32.9 G/DL (ref 31.5–36.5)
MCV RBC AUTO: 97 FL (ref 78–100)
MONOCYTES # BLD AUTO: 0.7 10E9/L (ref 0–1.3)
MONOCYTES NFR BLD AUTO: 8.9 %
NEUTROPHILS # BLD AUTO: 5 10E9/L (ref 1.6–8.3)
NEUTROPHILS NFR BLD AUTO: 62.3 %
NONHDLC SERPL-MCNC: 148 MG/DL
PLATELET # BLD AUTO: 310 10E9/L (ref 150–450)
POTASSIUM SERPL-SCNC: 4.5 MMOL/L (ref 3.4–5.3)
PROT SERPL-MCNC: 7.8 G/DL (ref 6.8–8.8)
RBC # BLD AUTO: 4.38 10E12/L (ref 3.8–5.2)
SODIUM SERPL-SCNC: 139 MMOL/L (ref 133–144)
T3 SERPL-MCNC: 96 NG/DL (ref 60–181)
T4 FREE SERPL-MCNC: 1.45 NG/DL (ref 0.76–1.46)
TRIGL SERPL-MCNC: 101 MG/DL
TSH SERPL DL<=0.005 MIU/L-ACNC: 2.67 MU/L (ref 0.4–4)
WBC # BLD AUTO: 8.1 10E9/L (ref 4–11)

## 2018-10-01 PROCEDURE — 87798 DETECT AGENT NOS DNA AMP: CPT | Performed by: FAMILY MEDICINE

## 2018-10-01 PROCEDURE — 84439 ASSAY OF FREE THYROXINE: CPT | Performed by: FAMILY MEDICINE

## 2018-10-01 PROCEDURE — 84480 ASSAY TRIIODOTHYRONINE (T3): CPT | Performed by: FAMILY MEDICINE

## 2018-10-01 PROCEDURE — 99213 OFFICE O/P EST LOW 20 MIN: CPT | Performed by: FAMILY MEDICINE

## 2018-10-01 PROCEDURE — 84443 ASSAY THYROID STIM HORMONE: CPT | Performed by: FAMILY MEDICINE

## 2018-10-01 PROCEDURE — 80053 COMPREHEN METABOLIC PANEL: CPT | Performed by: FAMILY MEDICINE

## 2018-10-01 PROCEDURE — 85025 COMPLETE CBC W/AUTO DIFF WBC: CPT | Performed by: FAMILY MEDICINE

## 2018-10-01 PROCEDURE — 36415 COLL VENOUS BLD VENIPUNCTURE: CPT | Performed by: FAMILY MEDICINE

## 2018-10-01 PROCEDURE — 80061 LIPID PANEL: CPT | Performed by: FAMILY MEDICINE

## 2018-10-01 RX ORDER — VALACYCLOVIR HYDROCHLORIDE 1 G/1
1000 TABLET, FILM COATED ORAL 3 TIMES DAILY
Qty: 21 TABLET | Refills: 1 | Status: SHIPPED | OUTPATIENT
Start: 2018-10-01 | End: 2018-10-13

## 2018-10-01 NOTE — MR AVS SNAPSHOT
After Visit Summary   10/1/2018    Mildred Tellez    MRN: [id number]           Patient Information     Date Of Birth          1970        Visit Information        Provider Department      10/1/2018 7:45 PM Katie Sewell MD [facility]        Today's Diagnoses     Herpes zoster without complication    -  1      Care Instructions      Shingles  Shingles is a viral infection caused by the same virus as chicken pox. Anyone who has had chicken pox may get shingles later in life. The virus stays in the body, but remains dormant (asleep). Shingles often occurs in older persons or persons with lowered immunity. But it can affect anyone at any age.  Shingles starts as a tingling patch of skin on one side of the body. Small, painful blisters may then appear. The rash does not spread to the rest of the body.  Exposure to shingles cannot cause shingles. However, it can cause chicken pox in anyone who has not had chicken pox or has not been vaccinated. The contagious period ends when all blisters have crusted over (generally about 2 weeks after the illness begins).  After the blisters heal, the affected skin may be sensitive or painful for months (neuralgia). This often gradually goes away.  A shingles vaccine is available. This can help prevent shingles or make it less painful. It is generally recommended for adults over the age of 60 who have had chicken pox in the past, but who have never had shingles. Adults over 60 who have had neither chicken pox nor shingles can prevent both diseases with the chicken pox vaccine. Ask your healthcare provider about these vaccines.  Home care    Medicines may be prescribed to help relieve pain. Take these medicines as directed. Ask your healthcare provider or pharmacist before using over-the-counter medicines for helping treat pain and itching.    In certain cases, antiviral medicines may be prescribed to reduce pain, shorten the illness, and  prevent neuralgia. Take these medicines as directed.    Compresses made from a solution of cool water mixed with cornstarch or baking soda may help relieve pain and itching.     Gently wash skin daily with soap and water to help prevent infection.  Be certain to rinse off all of the soap, which can be irritating.    Trim fingernails and try not to scratch. Scratching the sores may leave scars.    Stay home from work or school until all blisters have formed a crust and you are no longer contagious.  Follow-up care  Follow up with your healthcare provider or as directed by our staff.  When to seek medical advice    Fever of 100.4 F (38 C) or higher, or as directed by your healthcare provider    Affected skin is on the face or neck and any of the following occur:  ? Headache  ? Eye pain  ? Changes in vision  ? Sores near the eye  ? Weakness of facial muscles    Pain, redness, or swelling of a joint    Signs of skin infection: colored drainage from the sores, warmth, increasing redness, or increasing pain  Date Last Reviewed: 9/25/2015 2000-2017 [employer]. [address] [place] [address]. All rights reserved. This information is not intended as a substitute for professional medical care. Always follow your healthcare professional's instructions.                Follow-ups after your visit        Who to contact     If you have questions or need follow up information about today's clinic visit or your schedule please contact [facility] directly at [phone].  Normal or non-critical lab and imaging results will be communicated to you by MyChart, letter or phone within 4 business days after the clinic has received the results. If you do not hear from us within 7 days, please contact the clinic through [employer] St[employer] or phone. If you have a critical or abnormal lab result, we will notify you by phone as soon as possible.  Submit refill requests through [employer] or call your pharmacy  and they will forward the refill request to us. Please allow 3 business days for your refill to be completed.          Additional Information About Your Visit        [employer] Information     [employer] gives you secure access to your electronic health record. If you see a primary care provider, you can also send messages to your care team and make appointments. If you have questions, please call your primary care clinic.  If you do not have a primary care provider, please call [phone] and they will assist you.        Care EveryWhere ID     This is your Care EveryWhere ID. This could be used by other organizations to access your [facility] medical records  [id number]        Your Vitals Were     Pulse Temperature Pulse Oximetry             72 98.3  F (36.8  C) (Oral) 98%          Blood Pressure from Last 3 Encounters:   10/01/18 104/68   09/17/18 114/62   07/26/18 113/76    Weight from Last 3 Encounters:   09/17/18 153 lb 12.8 oz (69.8 kg)   11/10/17 153 lb (69.4 kg)   09/15/17 154 lb (69.9 kg)              We Performed the Following     Varicella Zoster Rapid Viral Cult([facility])          Today's Medication Changes          These changes are accurate as of 10/1/18  8:30 PM.  If you have any questions, ask your nurse or doctor.               Start taking these medicines.        Dose/Directions    valACYclovir 1000 mg tablet   Commonly known as:  VALTREX   Used for:  Herpes zoster without complication   Started by:  Katie Sewell MD        Dose:  1000 mg   Take 1 tablet (1,000 mg) by mouth 3 times daily   Quantity:  21 tablet   Refills:  1            Where to get your medicines      These medications were sent to [facility] [address] [address] AT [facility] [address] [facility] [place] [address]    Hours:  24-hours Phone:  [phone]     valACYclovir 1000 mg tablet                Primary Care Provider Office Phone # Fax #    Mary Langston MD  [phone]       [address] [place] [address]        Equal Access to Services     NAVNEET SIMS : Hadii aad ku hadricardomilly Dontrellali, wabrianda jacquelinejohnathanha, willyta kazainada radha, paola lexiin hayaan ashlymelania romero laImanpatrick melton. So [facility] [phone].    ATENCIÓN: Si habla español, tiene a sousa disposición servicios gratuitos de asistencia lingüística. Llame al [phone].    We comply with applicable federal civil rights laws and [place] laws. We do not discriminate on the basis of race, color, national origin, age, disability, sex, sexual orientation, or gender identity.            Thank you!     Thank you for choosing [facility]  for your care. Our goal is always to provide you with excellent care. Hearing back from our patients is one way we can continue to improve our services. Please take a few minutes to complete the written survey that you may receive in the mail after your visit with us. Thank you!             Your Updated Medication List - Protect others around you: Learn how to safely use, store and throw away your medicines at www.disposemymeds.org.          This list is accurate as of 10/1/18  8:30 PM.  Always use your most recent med list.                   Brand Name Dispense Instructions for use Diagnosis    levothyroxine 125 MCG tablet    SYNTHROID/LEVOTHROID    90 tablet    Take 1 tablet (125 mcg) by mouth daily    Hypothyroidism, unspecified type       valACYclovir 1000 mg tablet    VALTREX    21 tablet    Take 1 tablet (1,000 mg) by mouth 3 times daily    Herpes zoster without complication

## 2018-10-02 NOTE — PATIENT INSTRUCTIONS
Shingles  Shingles is a viral infection caused by the same virus as chicken pox. Anyone who has had chicken pox may get shingles later in life. The virus stays in the body, but remains dormant (asleep). Shingles often occurs in older persons or persons with lowered immunity. But it can affect anyone at any age.  Shingles starts as a tingling patch of skin on one side of the body. Small, painful blisters may then appear. The rash does not spread to the rest of the body.  Exposure to shingles cannot cause shingles. However, it can cause chicken pox in anyone who has not had chicken pox or has not been vaccinated. The contagious period ends when all blisters have crusted over (generally about 2 weeks after the illness begins).  After the blisters heal, the affected skin may be sensitive or painful for months (neuralgia). This often gradually goes away.  A shingles vaccine is available. This can help prevent shingles or make it less painful. It is generally recommended for adults over the age of 60 who have had chicken pox in the past, but who have never had shingles. Adults over 60 who have had neither chicken pox nor shingles can prevent both diseases with the chicken pox vaccine. Ask your healthcare provider about these vaccines.  Home care    Medicines may be prescribed to help relieve pain. Take these medicines as directed. Ask your healthcare provider or pharmacist before using over-the-counter medicines for helping treat pain and itching.    In certain cases, antiviral medicines may be prescribed to reduce pain, shorten the illness, and prevent neuralgia. Take these medicines as directed.    Compresses made from a solution of cool water mixed with cornstarch or baking soda may help relieve pain and itching.     Gently wash skin daily with soap and water to help prevent infection.  Be certain to rinse off all of the soap, which can be irritating.    Trim fingernails and try not to scratch. Scratching the sores  may leave scars.    Stay home from work or school until all blisters have formed a crust and you are no longer contagious.  Follow-up care  Follow up with your healthcare provider or as directed by our staff.  When to seek medical advice    Fever of 100.4 F (38 C) or higher, or as directed by your healthcare provider    Affected skin is on the face or neck and any of the following occur:  ? Headache  ? Eye pain  ? Changes in vision  ? Sores near the eye  ? Weakness of facial muscles    Pain, redness, or swelling of a joint    Signs of skin infection: colored drainage from the sores, warmth, increasing redness, or increasing pain  Date Last Reviewed: 9/25/2015 2000-2017 [employer]. [address] [place] [address], [place] [address]. All rights reserved. This information is not intended as a substitute for professional medical care. Always follow your healthcare professional's instructions.

## 2018-10-02 NOTE — PROGRESS NOTES
SUBJECTIVE:  Chief Complaint   Patient presents with     Urgent Care     Derm Problem     Rash on Rt lateral side of body started yesterday morning- blisters, burning sensation, nerve pain on same area.     Mildred Tellez is a 47 year old female  who presents to the clinic today for a  painful area of skin with  rash.  Onset   was 5 day(s) ago and  is gradual onset, still present and worsening.    Rash was noted yesterday  Location  : right abdomen and back.  Quality/symptoms : burning, painful, red and blistering   Symptoms are moderate and the affected skin seems to be worsening.  Previous history of a similar symptoms? No  Recent exposure history: none known     Associated symptoms include: nothing.    Past Medical History:   Diagnosis Date     Abnormal Pap smear, can't excl hi gd sq intraepithelial lesion (ASC-H) 7/2013    colp neg     Bleeding gastric ulcer 1997     from taking aspirin at age 27 - couldn't tell whether resolved      Hypothyroidism     after first pregnancy      Papanicolaou smear of cervix with atypical squamous cells of undetermined significance (ASC-US) 7/2015     neg HPV, needs colp     Post op infection     s/p her appy as a child      Postpartum septic endometritis     fr.  prolonged rupture     RLQ abdominal pain     when getting up from sitting in area of appy     Patient Active Problem List   Diagnosis     Hypothyroidism, unspecified type     Rosacea     Hyperlipidemia LDL goal <160     RLQ abdominal pain - when getting up from sitting in area of appy     Abnormal Pap smear, can't excl hi gd sq intraepithelial lesion (ASC-H)     Mastodynia- left breast     Compound nevus of neck - with dysplastic features - right neck 11/2013     ASCUS pap -  7/2013 - saw Dr. [facility]tr[facility]      History of dysplastic nevus     Coxsackie virus infection - severe - summer of 2016 - hands and feet totally peeled and oral lesions very painful     Cervicalgia     Gas bloat syndrome        ALLERGIES:  Review of patient's allergies indicates no known allergies.      Current Outpatient Prescriptions on File Prior to Visit:  levothyroxine (SYNTHROID/LEVOTHROID) 125 MCG tablet Take 1 tablet (125 mcg) by mouth daily     No current facility-administered medications on file prior to visit.     Social History   Substance Use Topics     Smoking status: Never Smoker     Smokeless tobacco: Never Used     Alcohol use 0.0 oz/week     0 Standard drinks or equivalent per week      Comment: rare       Family History   Problem Relation Age of Onset     Hypertension Mother      Hypertension Maternal Grandmother      Diabetes Paternal Grandmother       of complications fr. diabetes     C.A.D. Father      on blood thinners for his PAD     Cardiovascular Father      peripheral vascular disease- very heavy smoker      Lung Cancer Father      Breast Cancer Other      paternal great aunt         ROS:  CONSTITUTIONAL:NEGATIVE for fever, chills,   EYES: NEGATIVE for vision changes or irritation  ENT/MOUTH: NEGATIVE for ear, mouth and throat problems  RESP:NEGATIVE for significant cough or SOB  GI: NEGATIVE for nausea, abdominal pain,      EXAM:   /68 (BP Location: Right arm, Patient Position: Chair, Cuff Size: Adult Regular)  Pulse 72  Temp 98.3  F (36.8  C) (Oral)  SpO2 98%  GENERAL: alert, mild distress.  SKIN: Rash description:    Distribution: dermatomal  Location:  right abdomen and back    Color: red,  Lesion type: vesicular, blotchy with tenderness and inflammation  5 nickel size clusters with vesicles     NECK: supple, non-tender to palpation, no adenopathy noted  ,RESP: lungs clear to auscultation - no rales, rhonchi or wheezes  CV: regular rates and rhythm, normal S1 S2, no murmur noted  MS:extremities normal- no gross deformities noted,  FROM noted in all extremities  NEURO: Normal strength and tone, sensory exam grossly normal,  normal speech and mentation    ASSESSMENT:  Herpes zoster without  complication      - valACYclovir (VALTREX) 1000 mg tablet; Take 1 tablet (1,000 mg) by mouth 3 times daily  - Varicella Zoster DNA PCR CSF or Skin Swab       We discussed that the antiviral medications can decrease the severity of the zoster attack and reduce the risk of post -herpetic neuralgia when started promptly at the start of zoster symptoms, and that a delay in starting treatment produces less favorable results.  .  I advised the patient that zoster is chicken pox virus, and that  unvaccinated small children, pregnant women and adults who have not had chicken pox should be avoided until all skin lesions have dried and scabbed over  to prevent transmission of the disease.      Follow-up with primary clinic if not improving     May take acetaminophen / ibuprofen as an alternative for pain

## 2018-10-03 LAB
SPECIMEN TYPE: ABNORMAL
VARICELLA ZOSTER DNA PCR COMMENT: ABNORMAL
VZV DNA SPEC QL NAA+PROBE: ABNORMAL

## 2018-10-04 ENCOUNTER — OFFICE VISIT (OUTPATIENT)
Dept: FAMILY MEDICINE | Facility: CLINIC | Age: 48
End: 2018-10-04
Payer: COMMERCIAL

## 2018-10-04 VITALS — HEART RATE: 65 BPM | DIASTOLIC BLOOD PRESSURE: 73 MMHG | SYSTOLIC BLOOD PRESSURE: 108 MMHG

## 2018-10-04 DIAGNOSIS — M79.2 NERVE PAIN: ICD-10-CM

## 2018-10-04 DIAGNOSIS — B02.9 HERPES ZOSTER WITHOUT COMPLICATION: Primary | ICD-10-CM

## 2018-10-04 PROCEDURE — 99214 OFFICE O/P EST MOD 30 MIN: CPT | Performed by: PHYSICIAN ASSISTANT

## 2018-10-04 NOTE — PROGRESS NOTES
HPI:  Mildred Tellez is a 47 year old female patient here today for zoster on right flank .  Patient states this has been present for 5-6 days. Was seen in UC a few days ago and swab for zoster was positive. Pt started on valtrex x 7 days..  Patient reports the following symptoms: pain at site of rash and lower back. Right UE and nose feel 'different' too .  Patient reports the following previous treatments: valtrex x 5 days with some improvement.  Patient reports the following modifying factors: none.  Associated symptoms: none.  Patient has no other skin complaints today.  Remainder of the HPI, Meds, PMH, Allergies, FH, and SH was reviewed in chart.      Past Medical History:   Diagnosis Date     Abnormal Pap smear, can't excl hi gd sq intraepithelial lesion (ASC-H) 2013    colp neg     Bleeding gastric ulcer      from taking aspirin at age 27 - couldn't tell whether resolved      Hypothyroidism     after first pregnancy      Papanicolaou smear of cervix with atypical squamous cells of undetermined significance (ASC-US) 2015     neg HPV, needs colp     Post op infection     s/p her appy as a child      Postpartum septic endometritis     fr.  prolonged rupture     RLQ abdominal pain     when getting up from sitting in area of appy       Past Surgical History:   Procedure Laterality Date     C APPENDECTOMY          Family History   Problem Relation Age of Onset     Hypertension Mother      Hypertension Maternal Grandmother      Diabetes Paternal Grandmother       of complications fr. diabetes     C.A.D. Father      on blood thinners for his PAD     Cardiovascular Father      peripheral vascular disease- very heavy smoker      Lung Cancer Father      Breast Cancer Other      paternal great aunt       Social History     Social History     Marital status:      Spouse name: Raman (aditya Smith)     Number of children: 2     Years of education: 16+     Occupational History       - [employer]      in [facility]     Social History Main Topics     Smoking status: Never Smoker     Smokeless tobacco: Never Used     Alcohol use 0.0 oz/week     0 Standard drinks or equivalent per week      Comment: rare     Drug use: No      Comment: no herbal meds either      Sexual activity: Yes     Partners: Male     Birth control/ protection: Condom      Comment:  - rhythm method      Other Topics Concern      Service No     Blood Transfusions No     Caffeine Concern No     has been caffeine-free since bleeding gastric ulcer in 1997     Exercise Yes     swimming usually      Seat Belt Yes     always      Self-Exams Yes     SBE encouraged monthly     Parent/Sibling W/ Cabg, Mi Or Angioplasty Before 65f 55m? No     Social History Narrative    From [facility] - immig[place] to [place] in [place]         Children: Moiz - born in 2002 and Sherie = younger            Outpatient Encounter Prescriptions as of 10/4/2018   Medication Sig Dispense Refill     amitriptyline (ELAVIL) 25 MG tablet Take every day po by mouth 10 tablet 0     levothyroxine (SYNTHROID/LEVOTHROID) 125 MCG tablet Take 1 tablet (125 mcg) by mouth daily 90 tablet 3     valACYclovir (VALTREX) 1000 mg tablet Take 1 tablet (1,000 mg) by mouth 3 times daily 21 tablet 1     No facility-administered encounter medications on file as of 10/4/2018.        Review Of Systems:  Skin: As above  Eyes: negative  Ears/Nose/Throat: negative  Respiratory: No shortness of breath, dyspnea on exertion, cough, or hemoptysis  Cardiovascular: negative  Gastrointestinal: negative  Genitourinary: negative  Musculoskeletal: negative  Neurologic: negative  Psychiatric: negative  Hematologic/Lymphatic/Immunologic: negative  Endocrine: negative      Objective:     /73  Pulse 65  LMP 09/17/2018  Eyes: Conjunctivae/lids: Normal   ENT: Lips:  Normal  MSK: Normal  Cardiovascular: Peripheral edema none  Pulm: Breathing  Normal  Neuro/Psych: Orientation: Normal; Mood/Affect: Normal, NAD, WDWN  Pt accompanied by:    Following areas examined: face, neck, right flank, and abdomen  Fiore skin type:iii   Findings:  1) few vesicles on an erythematous base on right flank. No signs of infection.  Assessment and Plan:  1) herpes zoster with localized nerve pain  Finish your valtrex course. Refill if lesions area still present at the end of courst.  Start amitriptyline 1 pill at night for 10 days  Begin scheduled Tylenol and Ibuprofen for pain. Rotating between the two. Do not take more than 3,200mg of Ibuprofen in 24 hours and do not take more than 3,000mg of Tylenol in 24 hours. Do not take Tylenol if you have liver issues or are on medications such as Methotrexate. Do not take Ibuprofen if you have bleeding issues or have kidney issues. Take both with a large glass of water. Take Ibuprofen with food.   Discussed etiology and course.    Proper skin care from [facility]:    -Eliminate harsh soaps as they strip the natural oils from the skin, often resulting in dry itchy skin ( i.e. Dial, Zest, [place] Spring)  -Use mild soaps such as Cetaphil or Dove Sensitive Skin in the shower. You do not need to use soap on arms, legs, and trunk every time you shower unless visibly soiled.   -Avoid hot or cold showers.  -After showering, lightly dry off and apply moisturizing within 2-3 minutes. This will help trap moisture in the skin.   -Aggressive use of a moisturizer at least 1-2 times a day to the entire body (including -Vanicream, Cetaphil, Aquaphor or Cerave) and moisturize hands after every washing.  -We recommend using moisturizers that come in a tub that needs to be scooped out, not a pump. This has more of an oil base. It will hold moisture in your skin much better than a water base moisturizer. The above recommended are non-pore clogging.      Follow up in 10-14 days.

## 2018-10-04 NOTE — PATIENT INSTRUCTIONS
Finish your valtrex  Start amitriptyline 1 pill at night for 10 days  Begin scheduled Tylenol and Ibuprofen for pain. Rotating between the two. Do not take more than 3,200mg of Ibuprofen in 24 hours and do not take more than 3,000mg of Tylenol in 24 hours. Do not take Tylenol if you have liver issues or are on medications such as Methotrexate. Do not take Ibuprofen if you have bleeding issues or have kidney issues. Take both with a large glass of water. Take Ibuprofen with food.     Proper skin care from [facility]:    -Eliminate harsh soaps as they strip the natural oils from the skin, often resulting in dry itchy skin ( i.e. Dial, Zest, [place] Spring)  -Use mild soaps such as Cetaphil or Dove Sensitive Skin in the shower. You do not need to use soap on arms, legs, and trunk every time you shower unless visibly soiled.   -Avoid hot or cold showers.  -After showering, lightly dry off and apply moisturizing within 2-3 minutes. This will help trap moisture in the skin.   -Aggressive use of a moisturizer at least 1-2 times a day to the entire body (including -Vanicream, Cetaphil, Aquaphor or Cerave) and moisturize hands after every washing.  -We recommend using moisturizers that come in a tub that needs to be scooped out, not a pump. This has more of an oil base. It will hold moisture in your skin much better than a water base moisturizer. The above recommended are non-pore clogging.

## 2018-10-04 NOTE — MR AVS SNAPSHOT
After Visit Summary   10/4/2018    Mildred Tellez    MRN: [id number]           Patient Information     Date Of Birth          1970        Visit Information        Provider Department      10/4/2018 11:20 AM Leonora Graham PA-C [facility] [place]        Today's Diagnoses     Herpes zoster without complication    -  1      Care Instructions    Finish your valtrex  Start amitriptyline 1 pill at night for 10 days  Begin scheduled Tylenol and Ibuprofen for pain. Rotating between the two. Do not take more than 3,200mg of Ibuprofen in 24 hours and do not take more than 3,000mg of Tylenol in 24 hours. Do not take Tylenol if you have liver issues or are on medications such as Methotrexate. Do not take Ibuprofen if you have bleeding issues or have kidney issues. Take both with a large glass of water. Take Ibuprofen with food.     Proper skin care from [facility]:    -Eliminate harsh soaps as they strip the natural oils from the skin, often resulting in dry itchy skin ( i.e. Dial, Zest, [place] Spring)  -Use mild soaps such as Cetaphil or Dove Sensitive Skin in the shower. You do not need to use soap on arms, legs, and trunk every time you shower unless visibly soiled.   -Avoid hot or cold showers.  -After showering, lightly dry off and apply moisturizing within 2-3 minutes. This will help trap moisture in the skin.   -Aggressive use of a moisturizer at least 1-2 times a day to the entire body (including -Vanicream, Cetaphil, Aquaphor or Cerave) and moisturize hands after every washing.  -We recommend using moisturizers that come in a tub that needs to be scooped out, not a pump. This has more of an oil base. It will hold moisture in your skin much better than a water base moisturizer. The above recommended are non-pore clogging.                     Follow-ups after your visit        Who to contact     If you have questions or need follow up information about today's clinic visit  or your schedule please contact [facility] directly at [phone].  Normal or non-critical lab and imaging results will be communicated to you by MyChart, letter or phone within 4 business days after the clinic has received the results. If you do not hear from us within 7 days, please contact the clinic through [facility] or phone. If you have a critical or abnormal lab result, we will notify you by phone as soon as possible.  Submit refill requests through [facility] or call your pharmacy and they will forward the refill request to us. Please allow 3 business days for your refill to be completed.          Additional Information About Your Visit        [facility] Information     [facility] gives you secure access to your electronic health record. If you see a primary care provider, you can also send messages to your care team and make appointments. If you have questions, please call your primary care clinic.  If you do not have a primary care provider, please call [phone] and they will assist you.        Care EveryWhere ID     This is your Care EveryWhere ID. This could be used by other organizations to access your [place] [facility] medical records  [id number]        Your Vitals Were     Pulse Last Period                65 09/17/2018           Blood Pressure from Last 3 Encounters:   10/04/18 108/73   10/01/18 104/68   09/17/18 114/62    Weight from Last 3 Encounters:   09/17/18 153 lb 12.8 oz (69.8 kg)   11/10/17 153 lb (69.4 kg)   09/15/17 154 lb (69.9 kg)              Today, you had the following     No orders found for display         Today's Medication Changes          These changes are accurate as of 10/4/18 11:40 AM.  If you have any questions, ask your nurse or doctor.               Start taking these medicines.        Dose/Directions    amitriptyline 25 MG tablet   Commonly known as:  ELAVIL   Used for:  Herpes zoster without complication   Started by:  Leonora Graham PA-C        Take every  day po by mouth   Quantity:  10 tablet   Refills:  0            Where to get your medicines      These medications were sent to [facility] [place] - [place], [place] - [address]  [address], [place] [address]     Phone:  [phone]     amitriptyline 25 MG tablet                Primary Care Provider Office Phone # Fax #    Mary Langston [phone]       [address] [place] [id number]        Equal Access to Services     [facility]: Hadii aad ku hadasho Soomaali, waaxda luqadaha, qaybta kaalmada adeegyada, waxay idiin hayaan ademelania kharaakshat morales . So [facility] [phone].    ATENCIÓN: Si habla español, tiene a sousa disposición servicios gratuitos de asistencia lingüística. [facility] [phone].    We comply with applicable federal civil rights laws and [place] laws. We do not discriminate on the basis of race, color, national origin, age, disability, sex, sexual orientation, or gender identity.            Thank you!     Thank you for choosing [facility]  for your care. Our goal is always to provide you with excellent care. Hearing back from our patients is one way we can continue to improve our services. Please take a few minutes to complete the written survey that you may receive in the mail after your visit with us. Thank you!             Your Updated Medication List - Protect others around you: Learn how to safely use, store and throw away your medicines at www.disposemymeds.org.          This list is accurate as of 10/4/18 11:40 AM.  Always use your most recent med list.                   Brand Name Dispense Instructions for use Diagnosis    amitriptyline 25 MG tablet    ELAVIL    10 tablet    Take every day po by mouth    Herpes zoster without complication       levothyroxine 125 MCG tablet    SYNTHROID/LEVOTHROID    90 tablet    Take 1 tablet (125 mcg) by mouth daily    Hypothyroidism, unspecified type        valACYclovir 1000 mg tablet    VALTREX    21 tablet    Take 1 tablet (1,000 mg) by mouth 3 times daily    Herpes zoster without complication

## 2018-10-04 NOTE — LETTER
10/4/2018         RE: Mildred Tellez  [address] [facility] [address]        Dear Colleague,    Thank you for referring your patient, Mildred Tellez, to the [facility] [place] [facility]. Please see a copy of my visit note below.    HPI:  Mildred Tellez is a 47 year old female patient here today for zoster on right flank .  Patient states this has been present for 5-6 days. Was seen in UC a few days ago and swab for zoster was positive. Pt started on valtrex x 7 days..  Patient reports the following symptoms: pain at site of rash and lower back. Right UE and nose feel 'different' too .  Patient reports the following previous treatments: valtrex x 5 days with some improvement.  Patient reports the following modifying factors: none.  Associated symptoms: none.  Patient has no other skin complaints today.  Remainder of the HPI, Meds, PMH, Allergies, FH, and SH was reviewed in chart.      Past Medical History:   Diagnosis Date     Abnormal Pap smear, can't excl hi gd sq intraepithelial lesion (ASC-H) 2013    colp neg     Bleeding gastric ulcer      from taking aspirin at age 27 - couldn't tell whether resolved      Hypothyroidism     after first pregnancy      Papanicolaou smear of cervix with atypical squamous cells of undetermined significance (ASC-US) 2015     neg HPV, needs colp     Post op infection     s/p her appy as a child      Postpartum septic endometritis     fr.  prolonged rupture     RLQ abdominal pain     when getting up from sitting in area of appy       Past Surgical History:   Procedure Laterality Date     C APPENDECTOMY          Family History   Problem Relation Age of Onset     Hypertension Mother      Hypertension Maternal Grandmother      Diabetes Paternal Grandmother       of complications fr. diabetes     C.A.D. Father      on blood thinners for his PAD     Cardiovascular Father      peripheral vascular disease- very heavy smoker      Lung Cancer Father       Breast Cancer Other      paternal great aunt       Social History     Social History     Marital status:      Spouse name: Raman Smith)     Number of children: 2     Years of education: 16+     Occupational History      - [employer]      in [facility]     Social History Main Topics     Smoking status: Never Smoker     Smokeless tobacco: Never Used     Alcohol use 0.0 oz/week     0 Standard drinks or equivalent per week      Comment: rare     Drug use: No      Comment: no herbal meds either      Sexual activity: Yes     Partners: Male     Birth control/ protection: Condom      Comment:  - rhythm method      Other Topics Concern      Service No     Blood Transfusions No     Caffeine Concern No     has been caffeine-free since bleeding gastric ulcer in 1997     Exercise Yes     swimming usually      Seat Belt Yes     always      Self-Exams Yes     SBE encouraged monthly     Parent/Sibling W/ Cabg, Mi Or Angioplasty Before 65f 55m? No     Social History Narrative    From [facility] - immig[place] to [place] in [place]         Children: Moiz - born in 2002 and Sherie = younger            Outpatient Encounter Prescriptions as of 10/4/2018   Medication Sig Dispense Refill     amitriptyline (ELAVIL) 25 MG tablet Take every day po by mouth 10 tablet 0     levothyroxine (SYNTHROID/LEVOTHROID) 125 MCG tablet Take 1 tablet (125 mcg) by mouth daily 90 tablet 3     valACYclovir (VALTREX) 1000 mg tablet Take 1 tablet (1,000 mg) by mouth 3 times daily 21 tablet 1     No facility-administered encounter medications on file as of 10/4/2018.        Review Of Systems:  Skin: As above  Eyes: negative  Ears/Nose/Throat: negative  Respiratory: No shortness of breath, dyspnea on exertion, cough, or hemoptysis  Cardiovascular: negative  Gastrointestinal: negative  Genitourinary: negative  Musculoskeletal: negative  Neurologic: negative  Psychiatric:  negative  Hematologic/Lymphatic/Immunologic: negative  Endocrine: negative      Objective:     /73  Pulse 65  LMP 09/17/2018  Eyes: Conjunctivae/lids: Normal   ENT: Lips:  Normal  MSK: Normal  Cardiovascular: Peripheral edema none  Pulm: Breathing Normal  Neuro/Psych: Orientation: Normal; Mood/Affect: Normal, NAD, WDWN  Pt accompanied by:    Following areas examined: face, neck, right flank, and abdomen  Fiore skin type:iii   Findings:  1) few vesicles on an erythematous base on right flank. No signs of infection.  Assessment and Plan:  1) herpes zoster  Finish your valtrex course. Refill if lesions area still present at the end of courst.  Start amitriptyline 1 pill at night for 10 days  Begin scheduled Tylenol and Ibuprofen for pain. Rotating between the two. Do not take more than 3,200mg of Ibuprofen in 24 hours and do not take more than 3,000mg of Tylenol in 24 hours. Do not take Tylenol if you have liver issues or are on medications such as Methotrexate. Do not take Ibuprofen if you have bleeding issues or have kidney issues. Take both with a large glass of water. Take Ibuprofen with food.   Discussed etiology and course.    Proper skin care from [facility]:    -Eliminate harsh soaps as they strip the natural oils from the skin, often resulting in dry itchy skin ( i.e. Dial, Zest, Khmer Spring)  -Use mild soaps such as Cetaphil or Dove Sensitive Skin in the shower. You do not need to use soap on arms, legs, and trunk every time you shower unless visibly soiled.   -Avoid hot or cold showers.  -After showering, lightly dry off and apply moisturizing within 2-3 minutes. This will help trap moisture in the skin.   -Aggressive use of a moisturizer at least 1-2 times a day to the entire body (including -Vanicream, Cetaphil, Aquaphor or Cerave) and moisturize hands after every washing.  -We recommend using moisturizers that come in a tub that needs to be scooped out, not a pump. This has  more of an oil base. It will hold moisture in your skin much better than a water base moisturizer. The above recommended are non-pore clogging.      Follow up in 10-14 days.      Again, thank you for allowing me to participate in the care of your patient.        Sincerely,        Leonora Graham PA-C

## 2018-10-13 DIAGNOSIS — B02.9 HERPES ZOSTER WITHOUT COMPLICATION: ICD-10-CM

## 2018-10-13 NOTE — TELEPHONE ENCOUNTER
Reason for Call:  Medication or medication refill:    Do you use a [facility]?  Name of the pharmacy and phone number for the current request:  [facility] - [phone]    Name of the medication requested: Valacyclovir for shingles    Other request: asking for refill of this.  Was seen in  and still has sxs    Can we leave a detailed message on this number? YES    Phone number patient can be reached at: Cell number on file:    Telephone Information:   Mobile [phone]       Best Time:     Call taken on 10/13/2018 at 9:52 AM by Shelley Brian

## 2018-10-15 RX ORDER — VALACYCLOVIR HYDROCHLORIDE 1 G/1
1000 TABLET, FILM COATED ORAL 3 TIMES DAILY
Qty: 21 TABLET | Refills: 1 | Status: SHIPPED | OUTPATIENT
Start: 2018-10-15 | End: 2019-06-12

## 2018-10-15 NOTE — TELEPHONE ENCOUNTER
Patient called and I let her know the Rx went through today  Rachna Beasley RN- Triage [employer]

## 2018-11-15 ENCOUNTER — OFFICE VISIT (OUTPATIENT)
Dept: FAMILY MEDICINE | Facility: CLINIC | Age: 48
End: 2018-11-15
Payer: COMMERCIAL

## 2018-11-15 VITALS — DIASTOLIC BLOOD PRESSURE: 62 MMHG | SYSTOLIC BLOOD PRESSURE: 118 MMHG | HEART RATE: 76 BPM | OXYGEN SATURATION: 98 %

## 2018-11-15 DIAGNOSIS — B02.29 POSTHERPETIC NEURALGIA: Primary | ICD-10-CM

## 2018-11-15 PROCEDURE — 99213 OFFICE O/P EST LOW 20 MIN: CPT | Performed by: FAMILY MEDICINE

## 2018-11-15 NOTE — LETTER
11/15/2018         RE: Mildred Tellez  [address] [facility] [address]        Dear Colleague,    Thank you for referring your patient, Mildred Tellez, to the [facility]. Please see a copy of my visit note below.    [facility] - PRIMARY CARE SKIN    CC : Follow up shingles, nerve pain  SUBJECTIVE:                                                    Mildred Tellez is a 47 year old female who presents to clinic today for follow-up of herpes zoster that was diagnosed 2 months ago. Today Mildred reports that her shingles have resolved. About 2 weeks ago she began to have constant pain in the right lower back, along the area where her rash was present. This has improved some, but the pain still occurs while she drives. She also has some associated sensitivity of the area. Initially the pain was intense, but now she rates it at a 2.5/5.     Personal Medical History  Skin Cancer : NO- history of mild dysplastic nevi  Eczema Psoriasis Rosacea Autoimmune   NO NO NO NO     Family Medical History  Skin Cancer : NO  Eczema Psoriasis Rosacea Autoimmune   NO NO NO NO     Sun Exposure History  Sunscreen Use : YES  Previous history of significant sun exposure: YES    Occupation : [employer]  (indoor).    Refer to electronic medical record (EMR) for past medical history and medications.    ROS : 14 point review of systems was negative except the symptoms listed above in the HPI.    This document serves as a record of the services and decisions personally performed and made by Amelie Feliz MD. It was created on her behalf by Manjula Golden, a trained medical scribe.  The creation of this document is based on the scribe's personal observations and the provider's statements to the medical scribe.  Manjula Golden, November 15, 2018 11:52 AM    OBJECTIVE:                                                    GENERAL: healthy, alert and no distress  SKIN: Fiore Skin Type - III.  Trunk were  examined. The dermatoscope was used to help evaluate pigmented lesions.  Skin Pertinent Findings:  Abdomen : some small spots of post inflammatory erythema    Diagnostic Test Results:  none         ASSESSMENT:                                                      Encounter Diagnosis   Name Primary?     Postherpetic neuralgia Yes     PLAN:                                                    Patient Instructions   FUTURE APPOINTMENTS  Follow up as needed. Contact Dr. Feliz if you would like to start a medication for the nerve pain.   Follow up in a few months for shingles vaccine.      PROCEDURES:                                                    None.    TT : 20 minutes.  CT : 15 minutes.   >50% of time spent in discussion of post-herpetic neuralgia and shingles vaccine    The information in this document, created by the medical scribe for me, accurately reflects the services I personally performed and the decisions made by me. I have reviewed and approved this document for accuracy prior to leaving the patient care area.  November 15, 2018 11:52 AM  Jenny Feliz MD  [facility]    Again, thank you for allowing me to participate in the care of your patient.        Sincerely,        Jenny Feliz MD

## 2018-11-15 NOTE — PATIENT INSTRUCTIONS
FUTURE APPOINTMENTS  Follow up as needed. Contact Dr. Feliz if you would like to start a medication for the nerve pain.   Follow up in a few months for shingles vaccine.

## 2018-11-15 NOTE — PROGRESS NOTES
[facility] - PRIMARY CARE SKIN    CC : Follow up shingles, nerve pain  SUBJECTIVE:                                                    Mildred Tellez is a 47 year old female who presents to clinic today for follow-up of herpes zoster that was diagnosed 2 months ago. Today Mildred reports that her shingles have resolved. About 2 weeks ago she began to have constant pain in the right lower back, along the area where her rash was present. This has improved some, but the pain still occurs while she drives. She also has some associated sensitivity of the area. Initially the pain was intense, but now she rates it at a 2.5/5.     Personal Medical History  Skin Cancer : NO- history of mild dysplastic nevi  Eczema Psoriasis Rosacea Autoimmune   NO NO NO NO     Family Medical History  Skin Cancer : NO  Eczema Psoriasis Rosacea Autoimmune   NO NO NO NO     Sun Exposure History  Sunscreen Use : YES  Previous history of significant sun exposure: YES    Occupation : [employer]  (indoor).    Refer to electronic medical record (EMR) for past medical history and medications.    ROS : 14 point review of systems was negative except the symptoms listed above in the HPI.    This document serves as a record of the services and decisions personally performed and made by Amelie Feliz MD. It was created on her behalf by Manjula Golden, a trained medical scribe.  The creation of this document is based on the scribe's personal observations and the provider's statements to the medical scribe.  Manjula Golden, November 15, 2018 11:52 AM    OBJECTIVE:                                                    GENERAL: healthy, alert and no distress  SKIN: Fiore Skin Type - III.  Trunk were examined. The dermatoscope was used to help evaluate pigmented lesions.  Skin Pertinent Findings:  Abdomen : some small spots of post inflammatory erythema    Diagnostic Test Results:  none         ASSESSMENT:                                                       Encounter Diagnosis   Name Primary?     Postherpetic neuralgia Yes     PLAN:                                                    Patient Instructions   FUTURE APPOINTMENTS  Follow up as needed. Contact Dr. Feliz if you would like to start a medication for the nerve pain.   Follow up in a few months for shingles vaccine.      PROCEDURES:                                                    None.    TT : 20 minutes.  CT : 15 minutes.   >50% of time spent in discussion of post-herpetic neuralgia and shingles vaccine    The information in this document, created by the medical scribe for me, accurately reflects the services I personally performed and the decisions made by me. I have reviewed and approved this document for accuracy prior to leaving the patient care area.  November 15, 2018 11:52 AM  Jenny Feliz MD  [facility]

## 2018-11-15 NOTE — MR AVS SNAPSHOT
After Visit Summary   11/15/2018    Mildred Tellez    MRN: [id number]           Patient Information     Date Of Birth          1970        Visit Information        Provider Department      11/15/2018 11:40 AM Jenny Feliz MD [facility]        Today's Diagnoses     Postherpetic neuralgia    -  1      Care Instructions    FUTURE APPOINTMENTS  Follow up as needed. Contact Dr. Feliz if you would like to start a medication for the nerve pain.   Follow up in a few months for shingles vaccine.           Follow-ups after your visit        Follow-up notes from your care team     Return as needed .      Who to contact     If you have questions or need follow up information about today's clinic visit or your schedule please contact [facility] directly at [phone].  Normal or non-critical lab and imaging results will be communicated to you by MyChart, letter or phone within 4 business days after the clinic has received the results. If you do not hear from us within 7 days, please contact the clinic through MyChart or phone. If you have a critical or abnormal lab result, we will notify you by phone as soon as possible.  Submit refill requests through [employer] or call your pharmacy and they will forward the refill request to us. Please allow 3 business days for your refill to be completed.          Additional Information About Your Visit        MyChart Information     [employer] gives you secure access to your electronic health record. If you see a primary care provider, you can also send messages to your care team and make appointments. If you have questions, please call your primary care clinic.  If you do not have a primary care provider, please call [phone] and they will assist you.        Care EveryWhere ID     This is your Care EveryWhere ID. This could be used by other organizations to access your [facility] medical records  [id number]         Your Vitals Were     Pulse Pulse Oximetry                76 98%           Blood Pressure from Last 3 Encounters:   11/15/18 118/62   10/04/18 108/73   10/01/18 104/68    Weight from Last 3 Encounters:   09/17/18 69.8 kg (153 lb 12.8 oz)   11/10/17 69.4 kg (153 lb)   09/15/17 69.9 kg (154 lb)              Today, you had the following     No orders found for display       Primary Care Provider Office Phone # Fax #    Mary Langston [phone] [address] [facility] [address]        Equal Access to Services     [facility]: Hadii violetta chow hadricardoo Sojean claude, waaxda luqadaha, qaybta kaalmada radha, paola morales . So [facility] [phone].    ATENCIÓN: Si habla español, tiene a sousa disposición servicios gratuitos de asistencia lingüística. [facility] [phone].    We comply with applicable federal civil rights laws and [place] laws. We do not discriminate on the basis of race, color, national origin, age, disability, sex, sexual orientation, or gender identity.            Thank you!     Thank you for choosing [facility]  for your care. Our goal is always to provide you with excellent care. Hearing back from our patients is one way we can continue to improve our services. Please take a few minutes to complete the written survey that you may receive in the mail after your visit with us. Thank you!             Your Updated Medication List - Protect others around you: Learn how to safely use, store and throw away your medicines at www.disposemymeds.org.          This list is accurate as of 11/15/18 12:12 PM.  Always use your most recent med list.                   Brand Name Dispense Instructions for use Diagnosis    amitriptyline 25 MG tablet    ELAVIL    10 tablet    Take every day po by mouth    Herpes zoster without complication       levothyroxine 125 MCG tablet    SYNTHROID/LEVOTHROID    90 tablet    Take 1 tablet (125 mcg) by  mouth daily    Hypothyroidism, unspecified type       valACYclovir 1000 mg tablet    VALTREX    21 tablet    Take 1 tablet (1,000 mg) by mouth 3 times daily    Herpes zoster without complication

## 2018-11-17 ENCOUNTER — MYC MEDICAL ADVICE (OUTPATIENT)
Dept: FAMILY MEDICINE | Facility: CLINIC | Age: 48
End: 2018-11-17

## 2018-11-17 DIAGNOSIS — E03.9 HYPOTHYROIDISM, UNSPECIFIED TYPE: Primary | ICD-10-CM

## 2018-11-19 NOTE — TELEPHONE ENCOUNTER
TSH   Date Value Ref Range Status   10/01/2018 2.67 0.40 - 4.00 mU/L Final      Pt is on 125mcg right now.  We can increase her levothyroxine to 137mcg daily - rx sent to our pharmacy - and  Recheck tsh, free t4, total t3 in 4-6 weeks. Please inform patient.  Please  enter future orders for this and pt  can do this as a lab only appointment.

## 2018-11-20 RX ORDER — LEVOTHYROXINE SODIUM 137 UG/1
137 TABLET ORAL DAILY
Qty: 90 TABLET | Refills: 1 | Status: SHIPPED | OUTPATIENT
Start: 2018-11-20 | End: 2019-05-02

## 2018-12-28 DIAGNOSIS — E03.9 HYPOTHYROIDISM, UNSPECIFIED TYPE: ICD-10-CM

## 2018-12-28 LAB — T3 SERPL-MCNC: 100 NG/DL (ref 60–181)

## 2018-12-28 PROCEDURE — 84443 ASSAY THYROID STIM HORMONE: CPT | Performed by: FAMILY MEDICINE

## 2018-12-28 PROCEDURE — 36415 COLL VENOUS BLD VENIPUNCTURE: CPT | Performed by: FAMILY MEDICINE

## 2018-12-28 PROCEDURE — 84439 ASSAY OF FREE THYROXINE: CPT | Performed by: FAMILY MEDICINE

## 2018-12-28 PROCEDURE — 84480 ASSAY TRIIODOTHYRONINE (T3): CPT | Performed by: FAMILY MEDICINE

## 2018-12-29 LAB
T4 FREE SERPL-MCNC: 1.35 NG/DL (ref 0.76–1.46)
TSH SERPL DL<=0.005 MIU/L-ACNC: 1.06 MU/L (ref 0.4–4)

## 2019-04-18 DIAGNOSIS — E03.9 HYPOTHYROIDISM, UNSPECIFIED TYPE: Primary | ICD-10-CM

## 2019-05-02 DIAGNOSIS — E03.9 HYPOTHYROIDISM, UNSPECIFIED TYPE: ICD-10-CM

## 2019-05-02 RX ORDER — LEVOTHYROXINE SODIUM 137 UG/1
TABLET ORAL
Qty: 90 TABLET | Refills: 1 | Status: SHIPPED | OUTPATIENT
Start: 2019-05-02 | End: 2019-10-24

## 2019-05-02 NOTE — TELEPHONE ENCOUNTER
"Requested Prescriptions   Pending Prescriptions Disp Refills     levothyroxine (SYNTHROID/LEVOTHROID) 137 MCG tablet [Pharmacy Med Name: LEVOTHYROXINE SODIUM 137MCG TABS] 90 tablet 0     Sig: TAKE ONE TABLET BY MOUTH ONCE DAILY       Last Written Prescription Date:  11/20/2018  Last Fill Quantity: 90,  # refills: 1   Last office visit: 11/15/2018 with prescribing provider:     Future Office Visit:          Thyroid Protocol Passed - 5/2/2019  1:16 PM        Passed - Patient is 12 years or older        Passed - Recent (12 mo) or future (30 days) visit within the authorizing provider's specialty     Patient had office visit in the last 12 months or has a visit in the next 30 days with authorizing provider or within the authorizing provider's specialty.  See \"Patient Info\" tab in inbasket, or \"Choose Columns\" in Meds & Orders section of the refill encounter.              Passed - Medication is active on med list        Passed - Normal TSH on file in past 12 months     Recent Labs   Lab Test 12/28/18  1125   TSH 1.06              Passed - No active pregnancy on record     If patient is pregnant or has had a positive pregnancy test, please check TSH.          Passed - No positive pregnancy test in past 12 months     If patient is pregnant or has had a positive pregnancy test, please check TSH.          "

## 2019-05-02 NOTE — TELEPHONE ENCOUNTER
Prescription approved per [facility].      Kat Lambert, BS, RN, N  [facility]) [phone]

## 2019-06-12 ENCOUNTER — OFFICE VISIT (OUTPATIENT)
Dept: FAMILY MEDICINE | Facility: CLINIC | Age: 49
End: 2019-06-12
Payer: COMMERCIAL

## 2019-06-12 VITALS — DIASTOLIC BLOOD PRESSURE: 62 MMHG | SYSTOLIC BLOOD PRESSURE: 108 MMHG
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
## 2019-06-12 PROCEDURE — 99214 OFFICE O/P EST MOD 30 MIN: CPT | Performed by: FAMILY MEDICINE

## 2019-06-12 RX ORDER — TRIAMCINOLONE ACETONIDE 1 MG/G
CREAM TOPICAL 2 TIMES DAILY
Qty: 30 G | Refills: 1 | Status: SHIPPED | OUTPATIENT
Start: 2019-06-12 | End: 2019-09-20

## 2019-06-12 NOTE — LETTER
6/12/2019         RE: Mildred Tellez  [address] [facility] [address]        Dear Colleague,    Thank you for referring your patient, Mildred Tellez, to the [facility] [place]. Please see a copy of my visit note below.    [facility] - PRIMARY CARE SKIN    CC: Rash  SUBJECTIVE:   Mildred Tellez is a(n) 48 year old female who presents to clinic today because of a(n) rash which started four days ago.    She is unsure whether these are bites from insects, as she had been gardening on Saturday prior to onset of the red bumps. However, more spots are continuing to develop last night; she was not outdoors yesterday.    Areas of involvement: face, neck, both arms, right leg, left chest.    Itchiness: YES.  Scaling: NO.  Blistering: NO.  Transient: NO.    Recent exposure history: She had been gardening prior to onset of the rash.    Her  has had a few similar bites.    Personal Medical History  Skin cancer: NO - but history of mild dysplastic nevus(i)  Eczema Psoriasis Autoimmune   NO NO NO   Other: herpes zoster. History of exposure to [place] nuclear plant leak.    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    Patient Active Problem List   Diagnosis     Hypothyroidism, unspecified type     Rosacea     Hyperlipidemia LDL goal <160     RLQ abdominal pain - when getting up from sitting in area of appy     Abnormal Pap smear, can't excl hi gd sq intraepithelial lesion (ASC-H)     Mastodynia- left breast     Compound nevus of neck - with dysplastic features - right neck 11/2013     ASCUS pap -  7/2013 - saw Dr. Beltran ob/gyn [place]      History of dysplastic nevus     Coxsackie virus infection - severe - summer of 2016 - hands and feet totally peeled and oral lesions very painful     Cervicalgia     Gas bloat syndrome       Past Medical History:   Diagnosis Date     Abnormal Pap smear, can't excl hi gd sq  intraepithelial lesion (ASC-H) 2013    colp neg     Bleeding gastric ulcer      from taking aspirin at age 27 - couldn't tell whether resolved      Hypothyroidism     after first pregnancy      Papanicolaou smear of cervix with atypical squamous cells of undetermined significance (ASC-US) 2015     neg HPV, needs colp     Post op infection     s/p her appy as a child      Postpartum septic endometritis     fr.  prolonged rupture     RLQ abdominal pain     when getting up from sitting in area of appy    Past Surgical History:   Procedure Laterality Date     C APPENDECTOMY        Social History     Tobacco Use     Smoking status: Never Smoker     Smokeless tobacco: Never Used   Substance Use Topics     Alcohol use: Yes     Alcohol/week: 0.0 oz     Comment: rare     Drug use: No     Comment: no herbal meds either     Family History     Problem (# of Occurrences) Relation (Name,Age of Onset)    Breast Cancer (1) Other: paternal great aunt    C.A.D. (1) Father: on blood thinners for his PAD    Cardiovascular (1) Father: peripheral vascular disease- very heavy smoker     Diabetes (1) Paternal Grandmother:  of complications fr. diabetes    Hypertension (2) Mother, Maternal Grandmother    Lung Cancer (1) Father           Current Outpatient Medications   Medication Sig Dispense Refill     levothyroxine (SYNTHROID/LEVOTHROID) 137 MCG tablet TAKE ONE TABLET BY MOUTH ONCE DAILY 90 tablet 1     triamcinolone (KENALOG) 0.1 % external cream Apply topically 2 times daily to affected areas on arms, legs, trunk for 10-14 days. Not for use on face nor groin. 30 g 1       No Known Allergies     INTEGUMENTARY/SKIN: POSITIVE for changing lesion  ROS: 14 point review of systems was negative except the symptoms listed above in the HPI.    This document serves as a record of the services and decisions personally performed and made by Jenny Feliz MD and was created by Aubrey Walsh, a trained medical scribe, based on  personal observations and provider statements to the medical scribe.  June 12, 2019 9:55 AM   Aubrey Walsh    OBJECTIVE:   GENERAL: healthy, alert and no distress.  SKIN: Fiore Skin Type - II.  Face, Neck, Trunk, Arms and Legs examined. The dermatoscope was used to help evaluate pigmented lesions.  Skin Pertinent Findings:  Multiple discrete erythematous papules with central bite nila on the arms, neck, upper chest, and lower legs.    Diagnostic Test Results:  none     ASSESSMENT:     Encounter Diagnosis   Name Primary?     Insect bite, initial encounter Yes     .    PLAN:   Patient Instructions   FUTURE APPOINTMENTS  Follow up as needed.    TOPICAL STEROID INSTRUCTIONS  Triamcinolone 0.1% cream.  Apply two times per day for up to 10-14 days. Then, use only when needed.  1. Wash hands before applying topical steroid.  2. Apply sparingly (just enough to rub in) onto affected areas of the arms, legs, and trunk.  (Use the adult fingertip unit (FTU) as a guide.) Apply no more than 0.5 FTU per area.      This higher strength steroid should never be used on face nor groin.    After the initial treatment, topical steroid may be used as needed for flare-ups but only for short-term treatment. If you are using this for prolonged periods of time to control flare-ups, return to clinic for re-evaluation of treatment.    Keep in mind to also regularly use moisturizer, as this preventative measure can help maintain your skin's natural protective moisture barrier.      Try to keep covered with long sleeves and pants while working outdoors.      TT: 20 minutes.  CT: 15 minutes.    The information in this document, created by the medical scribe for me, accurately reflects the services I personally performed and the decisions made by me. I have reviewed and approved this document for accuracy prior to leaving the patient care area.  June 12, 2019 9:55 AM  Jenny Feliz MD  [facility]    Again, thank you for  allowing me to participate in the care of your patient.        Sincerely,        Jenny Feliz MD

## 2019-06-12 NOTE — PATIENT INSTRUCTIONS
FUTURE APPOINTMENTS  Follow up as needed.    TOPICAL STEROID INSTRUCTIONS  Triamcinolone 0.1% cream.  Apply two times per day for up to 10-14 days. Then, use only when needed.  1. Wash hands before applying topical steroid.  2. Apply sparingly (just enough to rub in) onto affected areas of the arms, legs, and trunk.  (Use the adult fingertip unit (FTU) as a guide.) Apply no more than 0.5 FTU per area.      This higher strength steroid should never be used on face nor groin.    After the initial treatment, topical steroid may be used as needed for flare-ups but only for short-term treatment. If you are using this for prolonged periods of time to control flare-ups, return to clinic for re-evaluation of treatment.    Keep in mind to also regularly use moisturizer, as this preventative measure can help maintain your skin's natural protective moisture barrier.      Try to keep covered with long sleeves and pants while working outdoors.

## 2019-06-12 NOTE — PROGRESS NOTES
[facility] - PRIMARY CARE SKIN    CC: Rash  SUBJECTIVE:   Mildred Tellez is a(n) 48 year old female who presents to clinic today because of a(n) rash which started four days ago.    She is unsure whether these are bites from insects, as she had been gardening on Saturday prior to onset of the red bumps. However, more spots are continuing to develop last night; she was not outdoors yesterday.    Areas of involvement: face, neck, both arms, right leg, left chest.    Itchiness: YES.  Scaling: NO.  Blistering: NO.  Transient: NO.    Recent exposure history: She had been gardening prior to onset of the rash.    Her  has had a few similar bites.    Personal Medical History  Skin cancer: NO - but history of mild dysplastic nevus(i)  Eczema Psoriasis Autoimmune   NO NO NO   Other: herpes zoster. History of exposure to [place] nuclear plant leak.    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    Patient Active Problem List   Diagnosis     Hypothyroidism, unspecified type     Rosacea     Hyperlipidemia LDL goal <160     RLQ abdominal pain - when getting up from sitting in area of appy     Abnormal Pap smear, can't excl hi gd sq intraepithelial lesion (ASC-H)     Mastodynia- left breast     Compound nevus of neck - with dysplastic features - right neck 11/2013     ASCUS pap -  7/2013 - saw Dr. Beltran ob/gyn [place]      History of dysplastic nevus     Coxsackie virus infection - severe - summer of 2016 - hands and feet totally peeled and oral lesions very painful     Cervicalgia     Gas bloat syndrome       Past Medical History:   Diagnosis Date     Abnormal Pap smear, can't excl hi gd sq intraepithelial lesion (ASC-H) 7/2013    colp neg     Bleeding gastric ulcer 1997     from taking aspirin at age 27 - couldn't tell whether resolved      Hypothyroidism     after first pregnancy      Papanicolaou smear of cervix with atypical squamous cells of  undetermined significance (ASC-US) 2015     neg HPV, needs colp     Post op infection     s/p her appy as a child      Postpartum septic endometritis     fr.  prolonged rupture     RLQ abdominal pain     when getting up from sitting in area of appy    Past Surgical History:   Procedure Laterality Date     C APPENDECTOMY        Social History     Tobacco Use     Smoking status: Never Smoker     Smokeless tobacco: Never Used   Substance Use Topics     Alcohol use: Yes     Alcohol/week: 0.0 oz     Comment: rare     Drug use: No     Comment: no herbal meds either     Family History     Problem (# of Occurrences) Relation (Name,Age of Onset)    Breast Cancer (1) Other: paternal great aunt    C.A.D. (1) Father: on blood thinners for his PAD    Cardiovascular (1) Father: peripheral vascular disease- very heavy smoker     Diabetes (1) Paternal Grandmother:  of complications fr. diabetes    Hypertension (2) Mother, Maternal Grandmother    Lung Cancer (1) Father           Current Outpatient Medications   Medication Sig Dispense Refill     levothyroxine (SYNTHROID/LEVOTHROID) 137 MCG tablet TAKE ONE TABLET BY MOUTH ONCE DAILY 90 tablet 1     triamcinolone (KENALOG) 0.1 % external cream Apply topically 2 times daily to affected areas on arms, legs, trunk for 10-14 days. Not for use on face nor groin. 30 g 1       No Known Allergies     INTEGUMENTARY/SKIN: POSITIVE for changing lesion  ROS: 14 point review of systems was negative except the symptoms listed above in the HPI.    This document serves as a record of the services and decisions personally performed and made by Jenny Feliz MD and was created by Aubrey Walsh, a trained medical scribe, based on personal observations and provider statements to the medical scribe.  2019 9:55 AM   Aubrey Walsh    OBJECTIVE:   GENERAL: healthy, alert and no distress.  SKIN: Fiore Skin Type - II.  Face, Neck, Trunk, Arms and Legs examined. The dermatoscope was used  to help evaluate pigmented lesions.  Skin Pertinent Findings:  Multiple discrete erythematous papules with central bite nila on the arms, neck, upper chest, and lower legs.    Diagnostic Test Results:  none     ASSESSMENT:     Encounter Diagnosis   Name Primary?     Insect bite, initial encounter Yes     .    PLAN:   Patient Instructions   FUTURE APPOINTMENTS  Follow up as needed.    TOPICAL STEROID INSTRUCTIONS  Triamcinolone 0.1% cream.  Apply two times per day for up to 10-14 days. Then, use only when needed.  1. Wash hands before applying topical steroid.  2. Apply sparingly (just enough to rub in) onto affected areas of the arms, legs, and trunk.  (Use the adult fingertip unit (FTU) as a guide.) Apply no more than 0.5 FTU per area.      This higher strength steroid should never be used on face nor groin.    After the initial treatment, topical steroid may be used as needed for flare-ups but only for short-term treatment. If you are using this for prolonged periods of time to control flare-ups, return to clinic for re-evaluation of treatment.    Keep in mind to also regularly use moisturizer, as this preventative measure can help maintain your skin's natural protective moisture barrier.      Try to keep covered with long sleeves and pants while working outdoors.      TT: 20 minutes.  CT: 15 minutes.    The information in this document, created by the medical scribe for me, accurately reflects the services I personally performed and the decisions made by me. I have reviewed and approved this document for accuracy prior to leaving the patient care area.  June 12, 2019 9:55 AM  Jenny Feliz MD  [facility] [place]

## 2019-06-14 DIAGNOSIS — E03.9 HYPOTHYROIDISM, UNSPECIFIED TYPE: ICD-10-CM

## 2019-06-14 LAB
T3 SERPL-MCNC: 95 NG/DL (ref 60–181)
T4 FREE SERPL-MCNC: 1.48 NG/DL (ref 0.76–1.46)
TSH SERPL DL<=0.005 MIU/L-ACNC: 1.28 MU/L (ref 0.4–4)

## 2019-06-14 PROCEDURE — 84443 ASSAY THYROID STIM HORMONE: CPT | Performed by: FAMILY MEDICINE

## 2019-06-14 PROCEDURE — 84439 ASSAY OF FREE THYROXINE: CPT | Performed by: FAMILY MEDICINE

## 2019-06-14 PROCEDURE — 84480 ASSAY TRIIODOTHYRONINE (T3): CPT | Performed by: FAMILY MEDICINE

## 2019-06-14 PROCEDURE — 36415 COLL VENOUS BLD VENIPUNCTURE: CPT | Performed by: FAMILY MEDICINE

## 2019-06-18 DIAGNOSIS — E03.9 HYPOTHYROIDISM, UNSPECIFIED TYPE: Primary | ICD-10-CM

## 2019-09-20 ENCOUNTER — OFFICE VISIT (OUTPATIENT)
Dept: FAMILY MEDICINE | Facility: CLINIC | Age: 49
End: 2019-09-20
Payer: COMMERCIAL

## 2019-09-20 VITALS
HEIGHT: 63 IN | SYSTOLIC BLOOD PRESSURE: 112 MMHG | WEIGHT: 152 LBS | BODY MASS INDEX: 26.93 KG/M2 | DIASTOLIC BLOOD PRESSURE: 62 MMHG | OXYGEN SATURATION: 99 % | HEART RATE: 82 BPM | TEMPERATURE: 98.2 F

## 2019-09-20 DIAGNOSIS — T78.1XXA FOOD SENSITIVITY WITH GASTROINTESTINAL SYMPTOMS: ICD-10-CM

## 2019-09-20 DIAGNOSIS — Z23 NEEDS FLU SHOT: ICD-10-CM

## 2019-09-20 DIAGNOSIS — Z12.39 SCREENING FOR BREAST CANCER: ICD-10-CM

## 2019-09-20 DIAGNOSIS — E66.3 OVERWEIGHT (BMI 25.0-29.9): ICD-10-CM

## 2019-09-20 DIAGNOSIS — Z00.01 ENCOUNTER FOR ROUTINE ADULT MEDICAL EXAM WITH ABNORMAL FINDINGS: Primary | ICD-10-CM

## 2019-09-20 DIAGNOSIS — E03.9 HYPOTHYROIDISM, UNSPECIFIED TYPE: ICD-10-CM

## 2019-09-20 DIAGNOSIS — E78.5 HYPERLIPIDEMIA LDL GOAL <160: ICD-10-CM

## 2019-09-20 PROCEDURE — 90686 IIV4 VACC NO PRSV 0.5 ML IM: CPT | Performed by: FAMILY MEDICINE

## 2019-09-20 PROCEDURE — 99214 OFFICE O/P EST MOD 30 MIN: CPT | Mod: 25 | Performed by: FAMILY MEDICINE

## 2019-09-20 PROCEDURE — 99396 PREV VISIT EST AGE 40-64: CPT | Mod: 25 | Performed by: FAMILY MEDICINE

## 2019-09-20 PROCEDURE — 90471 IMMUNIZATION ADMIN: CPT | Performed by: FAMILY MEDICINE

## 2019-09-20 SDOH — HEALTH STABILITY: MENTAL HEALTH: HOW OFTEN DO YOU HAVE A DRINK CONTAINING ALCOHOL?: NEVER

## 2019-09-20 ASSESSMENT — MIFFLIN-ST. JEOR: SCORE: 1288.6

## 2019-09-20 NOTE — PATIENT INSTRUCTIONS
"Strongly recommend help with weight loss with an organized program such as -  Medifast (now called  Livea - aditya Bhagat- as of 8/1/2019) ,  Weight watchers, Nutrisystem, Ideal Protein - available through Dr. Christianson's office at [facility] in [place];  Slim-Genics, Raysacristel Oconnell, or  Overeaters Anonymous - HOW program or other organized independent nutritionally balanced weight loss program.  We all need help with something and one of these programs may help you in your lifestyle change journey with weight loss.     Devolia or Novalere FP - \"plants in the nightshade family\" for foods to avoid .     Book -The Plant Paradox - by Dr. Juan R Terrazas.        Dr. Langston's recommended diet to rev-up metabolism for weight loss:   eat every 2-3 hours.    Try to keep your gram to gram ratio of carbs:protein to 12-15grams of each /small meal 5-6x/day.     Lean protein = 2 egg whites or 1 whole egg, or turkey, chicken, fish.  Low-glycemic fruits = strawberries, blueberries, raspberries, blackberries, nectarines, grapefruit, oranges,  apples.      2 oz. Lean protein + 1/2 cup  low-glycemic fruit --- breakfast and midmorning snack .     2 oz. Lean protein + 1/4 cup whole grain rice or sweet potato + 1 cup green veggie - lunch, dinner , and afternoon snack.     Evening snack : 1/2 cup of low glycemic fruit or an apple.        Try to keep carb: protein gram ratio about 1:1 with 12-15g of each per meal with small amount of monounsaturated fat -like olive oil.     If you can't kill it and grill it, or pick it off a plant and eat it - DON'T EAT IT!     For occasional pasta - try Barilla Plus - has protein in it. - keep to 1/2 cup instead of your 1/4 cup of rice or potato.     Take your fish oil capsules 2,000mg daily - with your probiotic, if you take one. Take a multivitamin daily.     Try to keep your gram to gram ratio of carbs:protein to 12-15grams of each /small meal 5-6x/day.     If 5-6 small meals/day - too labor " intensive, then keep to 3 meals plus 1 snack with 3 oz lean protein per meal with 2- 3 oz protein in your snack.     AVOID DAIRY AND GRAINS, if you can.   Keep your sodium to 600-1000g per day or less.     Thank you so much for choosing [facility].  Please contact us with any questions that you may have.   We appreciate the opportunity to serve you now and look forward to supporting your healthcare needs for a long time to come!    Most Sincerely,     Mary Langston MD            Preventive Health Recommendations  Female Ages 40 to 49    Yearly exam:     See your health care provider every year in order to  1. Review health changes.   2. Discuss preventive care.    3. Review your medicines if your doctor prescribed any.      Get a Pap test every three years (unless you have an abnormal result and your provider advises testing more often).      If you get Pap tests with HPV test, you only need to test every 5 years, unless you have an abnormal result. You do not need a Pap test if your uterus was removed (hysterectomy) and you have not had cancer.      You should be tested each year for STDs (sexually transmitted diseases), if you're at risk.     Ask your doctor if you should have a mammogram.      Have a colonoscopy (test for colon cancer) if someone in your family has had colon cancer or polyps before age 50.       Have a cholesterol test every 5 years.       Have a diabetes test (fasting glucose) after age 45. If you are at risk for diabetes, you should have this test every 3 years.    Shots: Get a flu shot each year. Get a tetanus shot every 10 years.     Nutrition:     Eat at least 5 servings of fruits and vegetables each day.    Eat whole-grain bread, whole-wheat pasta and brown rice instead of white grains and rice.    Get adequate Calcium and Vitamin D.      Lifestyle    Exercise at least 150 minutes a week (an average of 30 minutes a day, 5 days a week). This will help you control  your weight and prevent disease.    Limit alcohol to one drink per day.    No smoking.     Wear sunscreen to prevent skin cancer.    See your dentist every six months for an exam and cleaning.               Thank you for choosing [facility]  for your Health Care. It was a pleasure seeing you at your visit today. Please contact us with any questions or concerns you may have.                   Mary Langston MD                                  To reach your [facility] care team after hours call:   [phone]    Our clinic hours are:     Monday- 7:30 am - 7:00 pm                             Tuesday through Friday- 7:30 am - 5:00 pm                                        Saturday- 8:00 am - 12:00 pm                  Phone:  [phone]    Our pharmacy hours are:     Monday  8:00 am to 7:00 pm      Tuesday through Friday 8:00am to 6:00pm                        Saturday - 9:00 am to 1:00 pm      Sunday : Closed.              Phone:  [phone]      There is also information available at our web site:  [email or web address]    If your provider ordered any lab tests and you do not receive the results within 10 business days, please call the clinic.    If you need a medication refill please contact your pharmacy.  Please allow 2 business days for your refill to be completed.    Our clinic offers telephone visits and e visits.  Please ask one of your team members to explain more.      Use BookTourhart (secure email communication and access to your chart) to send your primary care provider a message or make an appointment. Ask someone on your Team how to sign up for [employer].

## 2019-09-27 DIAGNOSIS — E03.9 HYPOTHYROIDISM, UNSPECIFIED TYPE: ICD-10-CM

## 2019-09-27 DIAGNOSIS — Z00.01 ENCOUNTER FOR ROUTINE ADULT MEDICAL EXAM WITH ABNORMAL FINDINGS: ICD-10-CM

## 2019-09-27 DIAGNOSIS — E78.5 HYPERLIPIDEMIA LDL GOAL <160: ICD-10-CM

## 2019-09-27 LAB
ALBUMIN SERPL-MCNC: 3.8 G/DL (ref 3.4–5)
ALP SERPL-CCNC: 54 U/L (ref 40–150)
ALT SERPL W P-5'-P-CCNC: 23 U/L (ref 0–50)
ANION GAP SERPL CALCULATED.3IONS-SCNC: 6 MMOL/L (ref 3–14)
AST SERPL W P-5'-P-CCNC: 9 U/L (ref 0–45)
BILIRUB SERPL-MCNC: 0.4 MG/DL (ref 0.2–1.3)
BUN SERPL-MCNC: 15 MG/DL (ref 7–30)
CALCIUM SERPL-MCNC: 8.3 MG/DL (ref 8.5–10.1)
CHLORIDE SERPL-SCNC: 108 MMOL/L (ref 94–109)
CHOLEST SERPL-MCNC: 170 MG/DL
CO2 SERPL-SCNC: 23 MMOL/L (ref 20–32)
CREAT SERPL-MCNC: 0.69 MG/DL (ref 0.52–1.04)
ERYTHROCYTE [DISTWIDTH] IN BLOOD BY AUTOMATED COUNT: 12.7 % (ref 10–15)
GFR SERPL CREATININE-BSD FRML MDRD: >90 ML/MIN/{1.73_M2}
GLUCOSE SERPL-MCNC: 92 MG/DL (ref 70–99)
HCT VFR BLD AUTO: 40.1 % (ref 35–47)
HDLC SERPL-MCNC: 34 MG/DL
HGB BLD-MCNC: 13.7 G/DL (ref 11.7–15.7)
LDLC SERPL CALC-MCNC: 113 MG/DL
MCH RBC QN AUTO: 31.6 PG (ref 26.5–33)
MCHC RBC AUTO-ENTMCNC: 34.2 G/DL (ref 31.5–36.5)
MCV RBC AUTO: 93 FL (ref 78–100)
NONHDLC SERPL-MCNC: 136 MG/DL
PLATELET # BLD AUTO: 323 10E9/L (ref 150–450)
POTASSIUM SERPL-SCNC: 4.1 MMOL/L (ref 3.4–5.3)
PROT SERPL-MCNC: 7.6 G/DL (ref 6.8–8.8)
RBC # BLD AUTO: 4.33 10E12/L (ref 3.8–5.2)
SODIUM SERPL-SCNC: 137 MMOL/L (ref 133–144)
T3 SERPL-MCNC: 141 NG/DL (ref 60–181)
T4 FREE SERPL-MCNC: 1.39 NG/DL (ref 0.76–1.46)
TRIGL SERPL-MCNC: 116 MG/DL
TSH SERPL DL<=0.005 MIU/L-ACNC: 0.31 MU/L (ref 0.4–4)
WBC # BLD AUTO: 10.1 10E9/L (ref 4–11)

## 2019-09-27 PROCEDURE — 80061 LIPID PANEL: CPT | Performed by: FAMILY MEDICINE

## 2019-09-27 PROCEDURE — 85027 COMPLETE CBC AUTOMATED: CPT | Performed by: FAMILY MEDICINE

## 2019-09-27 PROCEDURE — 84480 ASSAY TRIIODOTHYRONINE (T3): CPT | Performed by: FAMILY MEDICINE

## 2019-09-27 PROCEDURE — 80053 COMPREHEN METABOLIC PANEL: CPT | Performed by: FAMILY MEDICINE

## 2019-09-27 PROCEDURE — 36415 COLL VENOUS BLD VENIPUNCTURE: CPT | Performed by: FAMILY MEDICINE

## 2019-09-27 PROCEDURE — 84443 ASSAY THYROID STIM HORMONE: CPT | Performed by: FAMILY MEDICINE

## 2019-09-27 PROCEDURE — 84439 ASSAY OF FREE THYROXINE: CPT | Performed by: FAMILY MEDICINE

## 2019-09-30 ENCOUNTER — TELEPHONE (OUTPATIENT)
Dept: FAMILY MEDICINE | Facility: CLINIC | Age: 49
End: 2019-09-30

## 2019-09-30 NOTE — TELEPHONE ENCOUNTER
Reason for Call:  Other call back    Detailed comments: Patient returning phone call for a triage nurse, no RN available. States she thinks it is for lab results. Please try her again.    Phone Number Patient can be reached at: Cell number on file:    Telephone Information:   Mobile [phone]       Best Time: anytime    Can we leave a detailed message on this number? YES    Call taken on 9/30/2019 at 1:33 PM by Lotus AWAD

## 2019-09-30 NOTE — RESULT ENCOUNTER NOTE
Please call pt with results below:     -Normal red blood cell (hgb) levels, normal white blood cell count and normal platelet levels.  -HDL(good) cholesterol level is low which can increase your heart disease risk.  A diet high in fat and simple carbohydrates, genetics and being overweight can contribute to this. Your LDL(bad) cholesterol and trigylceride levels are normal.  ADVISE: exercising 150 minutes of aerobic exercise per week (30 minutes 5 days per week or 50 minutes 3 days per week are options), and omega-3 fatty acids (fish oil) 0826-4139 mg daily are helpful to improve this.  In 12 months, you should recheck your fasting cholesterol panel by scheduling a lab-only appointment.  -Liver and gallbladder tests are normal (ALT,AST, Alk phos, bilirubin), kidney function is normal (Cr, GFR), sodium is normal, potassium is normal, calcium is a little low -   Recommend calcium 1200mg daily and vitamin D 1000iu daily and daily exercise with some resistance training to help keep your bones strong. Recent research has shown that daily or every other day weight bearing exercise with resistance training is especially important in maintaining and increasing bone density and preventing osteoporosis.   , glucose is normal.  -TSH (thyroid stimulating hormone) is abnormal suggesting you are currently overreplaced.  ADVISE: changing your medication dose to 125 micrograms/day (a prescription has been sent to your pharmacy) and  Recheck tsh, free t4, total t3 in 4-6 weeks.    Please  enter future orders for this and pt  can do this as a lab only appointment.   Please inform patient. They can have this done at any [facility] without appt during regular outpt lab hours or by lab only appt at any [place] clinic.      Adjust med list , too, please.    For additional lab test information, [email or web address] is an excellent reference.

## 2019-09-30 NOTE — TELEPHONE ENCOUNTER
Routing to J to review and advise on 9/27/2019 results.  No result note yet.      Kat Lambert, BS, RN, PHN  [facility]) [phone]

## 2019-10-01 DIAGNOSIS — E03.9 HYPOTHYROIDISM, UNSPECIFIED TYPE: Primary | ICD-10-CM

## 2019-10-24 ENCOUNTER — ANCILLARY PROCEDURE (OUTPATIENT)
Dept: MAMMOGRAPHY | Facility: CLINIC | Age: 49
End: 2019-10-24
Payer: COMMERCIAL

## 2019-10-24 ENCOUNTER — TELEPHONE (OUTPATIENT)
Dept: FAMILY MEDICINE | Facility: CLINIC | Age: 49
End: 2019-10-24

## 2019-10-24 DIAGNOSIS — Z12.39 SCREENING FOR BREAST CANCER: ICD-10-CM

## 2019-10-24 DIAGNOSIS — E03.9 HYPOTHYROIDISM, UNSPECIFIED TYPE: ICD-10-CM

## 2019-10-24 PROCEDURE — 77067 SCR MAMMO BI INCL CAD: CPT | Mod: TC

## 2019-10-24 PROCEDURE — 77063 BREAST TOMOSYNTHESIS BI: CPT | Mod: TC

## 2019-10-24 RX ORDER — LEVOTHYROXINE SODIUM 125 UG/1
137 TABLET ORAL DAILY
Qty: 90 TABLET | Refills: 1 | Status: SHIPPED | OUTPATIENT
Start: 2019-10-24 | End: 2020-03-24

## 2019-10-24 NOTE — TELEPHONE ENCOUNTER
Patient said that a nurse from the clinic told her that the doctor wanted to lower her Levothyroxine dose from 137mcg to 125mcg but she was not sure. Please confirm dose with MD and send new rx to [facility]. Patient would also like a call to confirm dosage. Thank you

## 2019-10-24 NOTE — TELEPHONE ENCOUNTER
Per last thyroid lab result note Levothyroxine dose was decreased to 125 mcg.    New dose sent to pharmacy.    Attempt # 1 to [phone]    Left non-detailed VM for patient to call back and speak with any triage nurse.    MANINDER Bravo, RN, PHN  [facility]

## 2019-10-25 NOTE — TELEPHONE ENCOUNTER
Patient returning call    Advised of notes below - Patient stated an understanding and agreed with plan.    Leonora Babcock RN  [facility]

## 2019-12-26 DIAGNOSIS — E03.9 HYPOTHYROIDISM, UNSPECIFIED TYPE: ICD-10-CM

## 2019-12-26 LAB — T3 SERPL-MCNC: 88 NG/DL (ref 60–181)

## 2019-12-26 PROCEDURE — 84443 ASSAY THYROID STIM HORMONE: CPT | Performed by: FAMILY MEDICINE

## 2019-12-26 PROCEDURE — 84480 ASSAY TRIIODOTHYRONINE (T3): CPT | Performed by: FAMILY MEDICINE

## 2019-12-26 PROCEDURE — 36415 COLL VENOUS BLD VENIPUNCTURE: CPT | Performed by: FAMILY MEDICINE

## 2019-12-26 PROCEDURE — 84439 ASSAY OF FREE THYROXINE: CPT | Performed by: FAMILY MEDICINE

## 2019-12-27 LAB
T4 FREE SERPL-MCNC: 1.25 NG/DL (ref 0.76–1.46)
TSH SERPL DL<=0.005 MIU/L-ACNC: 2.81 MU/L (ref 0.4–4)

## 2019-12-27 NOTE — RESULT ENCOUNTER NOTE
Mildred  I have reviewed your recent labs. Here are the results:    -TSH (thyroid stimulating hormone) level is normal which indicates appropriate thyroid replacement dosing.  ADVISE: continuing same replacement dose and rechecking this in 12 months.     If you have any questions please do not hesitate to contact our office via phone ([phone]) or MyChart.    Desiree Barrett, MS, PA-C  [facility] [place]

## 2020-01-23 ENCOUNTER — OFFICE VISIT (OUTPATIENT)
Dept: FAMILY MEDICINE | Facility: CLINIC | Age: 50
End: 2020-01-23
Payer: COMMERCIAL

## 2020-01-23 VITALS
WEIGHT: 151 LBS | BODY MASS INDEX: 26.75 KG/M2 | HEIGHT: 63 IN | SYSTOLIC BLOOD PRESSURE: 100 MMHG | TEMPERATURE: 97.6 F | OXYGEN SATURATION: 99 % | DIASTOLIC BLOOD PRESSURE: 58 MMHG | HEART RATE: 101 BPM

## 2020-01-23 DIAGNOSIS — J02.9 ACUTE PHARYNGITIS, UNSPECIFIED ETIOLOGY: Primary | ICD-10-CM

## 2020-01-23 DIAGNOSIS — J06.9 VIRAL URI: ICD-10-CM

## 2020-01-23 DIAGNOSIS — J01.90 ACUTE SINUSITIS WITH COEXISTING CONDITION, NEED PROPHYLACTIC TREATMENT: ICD-10-CM

## 2020-01-23 LAB
DEPRECATED S PYO AG THROAT QL EIA: NORMAL
SPECIMEN SOURCE: NORMAL

## 2020-01-23 PROCEDURE — 87880 STREP A ASSAY W/OPTIC: CPT | Performed by: FAMILY MEDICINE

## 2020-01-23 PROCEDURE — 99213 OFFICE O/P EST LOW 20 MIN: CPT | Performed by: FAMILY MEDICINE

## 2020-01-23 PROCEDURE — 87081 CULTURE SCREEN ONLY: CPT | Performed by: FAMILY MEDICINE

## 2020-01-23 RX ORDER — AMOXICILLIN 500 MG/1
1000 CAPSULE ORAL 2 TIMES DAILY
Qty: 40 CAPSULE | Refills: 0 | Status: SHIPPED | OUTPATIENT
Start: 2020-01-23 | End: 2020-02-02

## 2020-01-23 ASSESSMENT — MIFFLIN-ST. JEOR: SCORE: 1279.06

## 2020-01-23 NOTE — PROGRESS NOTES
SUBJECTIVE:                                                    Mildred Tellez is a 49 year old female who presents to clinic today for the following health issues:    Acute Illness   Acute illness concerns: sore throat  Onset: x1 day    Fever: no     Chills/Sweats: YES yesterday = chills     Headache (location?): no     Sinus Pressure:yes- right maxillary and frontal     Conjunctivitis:  no    Ear Pain: YES: right    Rhinorrhea: no     Congestion: no     Sore Throat: YES     Cough: no    Wheeze: no     Decreased Appetite: no     Nausea: YES    Vomiting: no     Diarrhea:  no , but slightly loose stool     Dysuria/Freq.: no     Fatigue/Achiness: YES- really fatigued    Sick/Strep Exposure: YES- daughter with 1 day of fever last week.      Therapies Tried and outcome: tylenol/Ibuprofen     Patient Active Problem List   Diagnosis     Hypothyroidism, unspecified type     Rosacea     Hyperlipidemia LDL goal <160     RLQ abdominal pain - when getting up from sitting in area of appy     Abnormal Pap smear, can't excl hi gd sq intraepithelial lesion (ASC-H)     Mastodynia- left breast     Compound nevus of neck - with dysplastic features - right neck 11/2013     ASCUS pap -  7/2013 - saw Dr. Beltran ob/[facility] [place]      History of dysplastic nevus     Coxsackie virus infection - severe - summer of 2016 - hands and feet totally peeled and oral lesions very painful     Cervicalgia     Gas bloat syndrome     Overweight (BMI 25.0-29.9)     Food sensitivity with gastrointestinal symptoms- plants in the nightshade family - tomatoes, potatoes, eggplant, tomatillos , peppers        Current Outpatient Medications   Medication Sig Dispense Refill     levothyroxine (SYNTHROID/LEVOTHROID) 125 MCG tablet Take 1 tablet (125 mcg) by mouth daily 90 tablet 1        No Known Allergies       Problem list and histories reviewed & adjusted, as indicated.  Additional history: as documented    Reviewed and updated as needed this visit by clinical  "staff       Reviewed and updated as needed this visit by Provider         ROS:   ROS: 12 point ROS neg other than the symptoms noted above    OBJECTIVE:                                                    /58   Pulse 101   Temp 97.6  F (36.4  C)   Ht 1.6 m (5' 3\")   Wt 68.5 kg (151 lb)   LMP 01/09/2020   SpO2 99%   BMI 26.75 kg/m    Body mass index is 26.75 kg/m .   GENERAL: healthy, alert, well nourished, well hydrated, no distress  HENT: ear canals- normal; TMs- normal; Nose- congested, no sinus pain or pressure;  Mouth- no ulcers, no lesions  NECK: no tenderness, no adenopathy, no asymmetry, no masses, no stiffness; thyroid- normal to palpation  RESP: lungs clear to auscultation - no rales, no rhonchi, no wheezes  CV: regular rates and rhythm, normal S1 S2, no S3 or S4 and no murmur, no click or rub -  ABDOMEN: soft, no tenderness, no  hepatosplenomegaly, no masses, normal bowel sounds  MS: extremities- no gross deformities noted, no edema    Diagnostic test results:  Diagnostic Test Results:  Labs reviewed in Epic  Results for orders placed or performed in visit on 01/23/20 (from the past 24 hour(s))   Strep, Rapid Screen   Result Value Ref Range    Specimen Description Throat     Rapid Strep A Screen       NEGATIVE: No Group A streptococcal antigen detected by immunoassay, await culture report.        ASSESSMENT/PLAN:                                                        ICD-10-CM    1. Acute pharyngitis, unspecified etiology J02.9 Strep, Rapid Screen     Beta strep group A culture   2. Acute sinusitis with coexisting condition, need prophylactic treatment J01.90 amoxicillin (AMOXIL) 500 MG capsule       See Patient Instructions. Please, call or return to clinic or go to the ER immediately if signs or symptoms worsen or fail to improve as anticipated.     If right sided sinus pain, not improving , then pt will call pharmacy l to fill abx. Will put on hold for now. Suspect this is more viral at this " time.          Mary Langston MD    [facility] [place]

## 2020-01-23 NOTE — PATIENT INSTRUCTIONS
Patient Education     Sinusitis (if you need Antibiotic Treatment)    The sinuses are air-filled spaces within the bones of the face. They connect to the inside of the nose. Sinusitis is an inflammation of the tissue that lines the sinuses. Sinusitis can occur during a cold. It can also happen due to allergies to pollens and other particles in the air. Sinusitis can cause symptoms of sinus congestion and a feeling of fullness. A sinus infection causes fever, headache, and facial pain. There is often green or yellow fluid draining from the nose or into the back of the throat (post-nasal drip). You have been given antibiotics to treat this condition.  Home care    Take the full course of antibiotics as instructed. Do not stop taking them, even when you feel better.    Drink plenty of water, hot tea, and other liquids. This may help thin nasal mucus. It also may help your sinuses drain fluids.    Heat may help soothe painful areas of your face. Use a towel soaked in hot water. Or,  the shower and direct the warm spray onto your face. Using a vaporizer along with a menthol rub at night may also help soothe symptoms.     An expectorant with guaifenesin may help thin nasal mucus and help your sinuses drain fluids.    You can use an over-the-counter decongestant, unless a similar medicine was prescribed to you. Nasal sprays work the fastest. Use one that contains phenylephrine or oxymetazoline. First blow your nose gently. Then use the spray. Do not use these medicines more often than directed on the label. If you do, your symptoms may get worse. You may also take pills that contain pseudoephedrine. Don t use products that combine multiple medicines. This is because side effects may be increased. Read labels. You can also ask the pharmacist for help. (People with high blood pressure should not use decongestants. They can raise blood pressure.)    Over-the-counter antihistamines may help if allergies contributed  to your sinusitis.      Do not use nasal rinses or irrigation during an acute sinus infection, unless your healthcare provider tells you to. Rinsing may spread the infection to other areas in your sinuses.    Use acetaminophen or ibuprofen to control pain, unless another pain medicine was prescribed to you. If you have chronic liver or kidney disease or ever had a stomach ulcer, talk with your healthcare provider before using these medicines. (Aspirin should never be taken by anyone under age 18 who is ill with a fever. It may cause severe liver damage.)    Don't smoke. This can make symptoms worse.  Follow-up care  Follow up with your healthcare provider or our staff if you are better in 1 week.  When to seek medical advice  Call your healthcare provider if any of these occur:    Facial pain or headache that gets worse    Stiff neck    Unusual drowsiness or confusion    Swelling of your forehead or eyelids    Vision problems, such as blurred or double vision    Fever of 100.4 F (38 C) or higher, or as directed by your healthcare provider    Seizure    Breathing problems    Symptoms don't go away in 10 days  Prevention  Here are steps you can take to help prevent an infection:    Keep good hand washing habits.    Don t have close contact with people who have sore throats, colds, or other upper respiratory infections.    Don t smoke, and stay away from secondhand smoke.    Stay up to date with of your vaccines.  Date Last Reviewed: 11/1/2017 2000-2018 [employer]. [address] [place] [address]. All rights reserved. This information is not intended as a substitute for professional medical care. Always follow your healthcare professional's instructions.  Thank you so much for choosing [facility].  Please contact us with any questions that you may have.   We appreciate the opportunity to serve you now and look forward to supporting your healthcare needs for a long  time to come!    Most Sincerely,     Mary Langston MD      Thank you so much or choosing [facility]  for your Health Care. It was a pleasure seeing you at your visit today! Please contact us with any questions or concerns you may have.                   Mary Langston MD                              To reach your [facility] care team after hours call:   [phone]    Our clinic hours are:     Monday- 7:30 am - 7:00 pm                             Tuesday through Friday- 7:30 am - 5:00 pm                                        Saturday- 8:00 am - 12:00 pm                  Phone:  [phone]    Our pharmacy hours are:     Monday  8:00 am to 7:00 pm      Tuesday through Friday 8:00am to 6:00pm                        Saturday - 9:00 am to 1:00 pm      Sunday : Closed.              Phone:  [phone]      There is also information available at our web site:  [email or web address]    If your provider ordered any lab tests and you do not receive the results within 10 business days, please call the clinic.    If you need a medication refill please contact your pharmacy.  Please allow 2 business days for your refill to be completed.    Our clinic offers telephone visits and e visits.  Please ask one of your team members to explain more.      Use [employer] (secure email communication and access to your chart) to send your primary care provider a message or make an appointment. Ask someone on your Team how to sign up for [employer].

## 2020-01-24 LAB
BACTERIA SPEC CULT: NORMAL
SPECIMEN SOURCE: NORMAL

## 2020-03-24 DIAGNOSIS — E03.9 HYPOTHYROIDISM, UNSPECIFIED TYPE: ICD-10-CM

## 2020-03-24 RX ORDER — LEVOTHYROXINE SODIUM 125 UG/1
TABLET ORAL
Qty: 90 TABLET | Refills: 1 | Status: SHIPPED | OUTPATIENT
Start: 2020-03-24 | End: 2020-09-28

## 2020-03-24 NOTE — TELEPHONE ENCOUNTER
"LEVOTHYROXINE SODIUM 125MCG TABS   Last Written Prescription Date:  10/24/2019  Last Fill Quantity: 90,  # refills: 1   Last office visit: 1/23/2020 with prescribing provider:  Mary Langston MD   Future Office Visit:  NA    Requested Prescriptions   Pending Prescriptions Disp Refills     levothyroxine (SYNTHROID/LEVOTHROID) 125 MCG tablet [Pharmacy Med Name: LEVOTHYROXINE SODIUM 125MCG TABS] 90 tablet 1     Sig: TAKE 1 TABLET BY MOUTH DAILY       Thyroid Protocol Passed - 3/24/2020 12:07 PM        Passed - Patient is 12 years or older        Passed - Recent (12 mo) or future (30 days) visit within the authorizing provider's specialty     Patient has had an office visit with the authorizing provider or a provider within the authorizing providers department within the previous 12 mos or has a future within next 30 days. See \"Patient Info\" tab in inbasket, or \"Choose Columns\" in Meds & Orders section of the refill encounter.              Passed - Medication is active on med list        Passed - Normal TSH on file in past 12 months     Recent Labs   Lab Test 12/26/19  1442   TSH 2.81              Passed - No active pregnancy on record     If patient is pregnant or has had a positive pregnancy test, please check TSH.          Passed - No positive pregnancy test in past 12 months     If patient is pregnant or has had a positive pregnancy test, please check TSH.               "

## 2020-09-27 DIAGNOSIS — E03.9 HYPOTHYROIDISM, UNSPECIFIED TYPE: ICD-10-CM

## 2020-09-28 RX ORDER — LEVOTHYROXINE SODIUM 125 UG/1
TABLET ORAL
Qty: 90 TABLET | Refills: 1 | Status: SHIPPED | OUTPATIENT
Start: 2020-09-28 | End: 2020-12-16

## 2020-09-28 NOTE — TELEPHONE ENCOUNTER
"levothyroxine (SYNTHROID/LEVOTHROID) 125 MCG tablet  90 tablet  1  3/24/2020   No    Sig: TAKE 1 TABLET BY MOUTH DAILY    Sent to pharmacy as: levothyroxine (SYNTHROID/LEVOTHROID) 125 MCG tablet    Class: E-Prescribe        Last office visit: 1/23/2020 with prescribing provider:    Future Office Visit:          Requested Prescriptions   Pending Prescriptions Disp Refills     levothyroxine (SYNTHROID/LEVOTHROID) 125 MCG tablet [Pharmacy Med Name: LEVOTHYROXINE SODIUM 125MCG TABS] 90 tablet 1     Sig: TAKE 1 TABLET BY MOUTH DAILY       Thyroid Protocol Passed - 9/27/2020  5:03 AM        Passed - Patient is 12 years or older        Passed - Recent (12 mo) or future (30 days) visit within the authorizing provider's specialty     Patient has had an office visit with the authorizing provider or a provider within the authorizing providers department within the previous 12 mos or has a future within next 30 days. See \"Patient Info\" tab in inbasket, or \"Choose Columns\" in Meds & Orders section of the refill encounter.              Passed - Medication is active on med list        Passed - Normal TSH on file in past 12 months     Recent Labs   Lab Test 12/26/19  1442   TSH 2.81              Passed - No active pregnancy on record     If patient is pregnant or has had a positive pregnancy test, please check TSH.          Passed - No positive pregnancy test in past 12 months     If patient is pregnant or has had a positive pregnancy test, please check TSH.             Refill per RN protocol     Magdalena Morales RN, BSN  [facility]     "

## 2020-09-30 ENCOUNTER — NURSE TRIAGE (OUTPATIENT)
Dept: FAMILY MEDICINE | Facility: CLINIC | Age: 50
End: 2020-09-30

## 2020-09-30 NOTE — LETTER
[address] [id number] [phone]          October 7, 2020    Mildred Tellez                                                                                                                     [address]  [facility] [address]      Dear Mildred,    Here is some information for you re: perimenopause.       Patient Education     Perimenopause  Menopause is when you stop having periods for good. For many women, this happens around age 50. The time of change before this is called perimenopause. It may start in your late 30s or 40s. It can last for months or years. During this time, your body makes lower levels of female hormones. This causes certain changes in your body. You may already have begun to feel the effects of these changes. By taking steps to relieve symptoms, you can still feel good.    The menstrual cycle  Hormones are chemicals that have specific jobs in the body. A menstrual cycle is caused by changes in the levels of 2 female hormones. These hormones are estrogen and progesterone. They are made by the ovaries. In a normal cycle, estrogen creates a lining in the uterus to allow for pregnancy. The ovary then releases an egg. This is called ovulation. Progesterone levels start to go up. If the egg is not fertilized, progesterone levels go down. This causes the lining in the uterus to be shed. This is the bleeding that is your period.  Changes in hormones  As a woman ages, her ovaries begin to make hormones less regularly. In some months, there may not be ovulation. Without ovulation, progesterone isn't secreted so a normal period doesn't occur. The menstrual cycle will be harder to predict. Over time, the ovaries stop working. This can cause symptoms. Some women who have had their uterus taken out (hysterectomy) but still have ovaries may still have symptoms. When estrogen levels reach their lowest point, periods will  stop completely. This is menopause.  Symptoms of perimenopause  The change in hormones can cause physical symptoms. It can also cause emotional symptoms. These may include:    Periods that come more or less often    Periods that are lighter or heavier than you re used to    Hot flashes, night sweats, or trouble sleeping    Vaginal dryness, which may make sex painful    Mood swings or fatigue    Palpitations    Sleep disturbances    Urinary symptoms including frequency and incontinence  Medicines that may help  Some medicines can help ease the effects of perimenopause. These include:    Low-dose birth control pills. These often contain both estrogen and progesterone. They can help regulate your periods.    Hormone therapy (HT). This replaces some of the hormones your body has stopped making.    Antidepressants. These help balance brain chemicals that may decrease during this time. Signs of depression can include often feeling sad or hopeless. If you feel this way, be sure to talk to your healthcare provider.    Gabapentin. This is a medicine also used to treat seizures. This controls hot flashes and night sweats in some women.    Clonidine. This medicine may control hot flashes and night sweats.  Date Last Reviewed: 11/1/2017 2000-2019 [employer]. [address] [place] [address]. All rights reserved. This information is not intended as a substitute for professional medical care. Always follow your healthcare professional's instructions.           Patient Education     Coping with Perimenopause     Being active in ways you enjoy can help you feel better.     For most women, the symptoms of perimenopause subside after entering menopause, although hot flashes and vaginal dryness can continue after periods have stopped. In the meantime, symptoms can be relieved to help you feel better. Leading a healthy lifestyle can keep you feeling your best. The tips below can help. Doing things you enjoy  and spending time with people you love are great ways to keep your spirits up as your body goes through the changes of perimenopause.  Menstrual changes    What happens: As hormone levels go down, periods can become irregular and hard to predict. These changes are often the first sign of perimenopause.    What you can do: Keep pads or tampons on hand in case your period comes unexpectedly. You may also want to talk to your healthcare provider about taking birth control pills to help regulate your periods.  Hot flashes and night sweats    What happens: During a hot flash, you suddenly feel very warm. This may happen often, or just once in a while. Hot flashes at night may interrupt sleep. You may also wake up covered in sweat (night sweats).    What you can do: Wear layers that you can remove. Try all-cotton clothing, sheets, and blankets. At night, open your bedroom window. Keep a glass of water or small fan by your bed in case a hot flash wakes you up.  Bone changes    What happens: Lower levels of estrogen increase your risk of osteoporosis, a disease that weakens the bones. This can cause your bones to break more easily.    What you can do: Eating foods high in calcium and vitamin D helps protect your bones. Your healthcare provider may also suggest taking a calcium supplement. It's also helpful to quit smoking, if you smoke. Don't drink alcohol and get plenty of regular exercise.   Vaginal changes    What happens: In the absence of estrogen, the lining of the vagina can become thin and dry. This can make sex painful. Vaginal infections may also be more likely.    What you can do: Apply a water-based lubricant before having sex. If you are not using condoms or diaphragms for birth control, you can also use silicone-based lubricants, which don't have to be reapplied as often as water-based lubricants. Over-the-counter vaginal moisturizers can be used anytime, not just when you are having sex. You can also talk  with your healthcare provider about using a vaginal cream that contains estrogen.  Mood swings    What happens: As hormone levels decrease, so do chemicals that affect your mood. Hot flashes and trouble sleeping can make mood swings worse. Your sex drive may also decrease.    What you can do: Understand that these feelings are common. Talk to your partner and to other women your age about how you re feeling. Try exercising, which increases levels of mood-boosting chemicals in your body. If your mood swings are affecting your quality of life, talk with your healthcare provider about medicine or other options.  Stay active!  Activity can help you relax and gives you more energy. Exercise also helps keep bones and muscles strong. Staying fit may even give your sex drive a lift. Consider the following:    Weight-bearing activities, such as walking and jogging, help maintain bone density. This can help prevent osteoporosis.    Aerobic activities boost energy by moving oxygen through the body. Walking and jogging are aerobic. You might also try swimming or biking.    Sunlight helps raise levels of brain chemicals that boost your mood. Spending time outdoors can improve your mood, even on a cloudy day. Even a walk around the block can help!  If you re concerned about sex  You may notice that you re not as interested in sex as you used to be. This is very common during perimenopause. Since you re more likely to enjoy sex when you re comfortable, getting your symptoms under control might help. Talk to your healthcare provider about medicines or other options. And remember that there s more to sex than intercourse. Relax and take your time. Talk to your partner about trying new things to help get you aroused.  Date Last Reviewed: 9/1/2017 2000-2019 [employer]. [address] [facility], [place] [address]. All rights reserved. This information is not intended as a substitute for professional medical care. Always  follow your healthcare professional's instructions.    Please, call ,  if signs or symptoms worsen or fail to improve as anticipated.         Sincerely,             Mary Langston

## 2020-09-30 NOTE — TELEPHONE ENCOUNTER
General Call:     Who is calling:  Mildred    Reason for Call:  Mildred is calling to get an appointment with Dr. Langston for absence of her period. She said it's been a couple of weeks since she's had her period. She would like a call back.    Okay to leave a detailed message:Yes at Cell number on file:    Telephone Information:   Mobile [phone]

## 2020-10-01 NOTE — TELEPHONE ENCOUNTER
"Called patient @ # below -     Additional Information    Patient wants to be seen    Age > 40 years    Answer Assessment - Initial Assessment Questions  1. LMP:  \"When did your last menstrual period begin?\"      8/20/20  2. DAYS LATE: \"How many days late is your menstrual period?\"      2 weeks  3. REGULARITY: \"How regular are your periods?\"      Has been very \"wacky\" since COVID. ~20 days in between menstrual cycles.   4. PREGNANCY: \"Is there any chance you are pregnant?\" (e.g., unprotected intercourse, missed birth control pill, broken condom) \"Have you used a home pregnancy test?\"      Did home pregnancy test x 2 last week and the week prior - both negative  5. BREASTFEEDING: \"Are you breastfeeding?\"      No  6. BIRTH CONTROL PILLS: \"Are you taking birth control pills, or have you stopped recently?\"      None  7. DEPOPROVERA: \"Has your doctor given you a shot to prevent pregnancy?\" (e.g., Depoprovera injection)      None  8. CAUSE: \"What do you think caused the missed period?\" (e.g., stress, rapid weight loss, excessive exercise)      Unsure  9. OTHER SYMPTOMS: \"Do you have any other symptoms?\" (e.g., abdominal pain)      Abdominal bloating, cramping    Protocols used: MENSTRUAL PERIOD - MISSED OR LATE-A-OH    DENIES: urinary sx (frequency, urgency, dysuria, vaginal discharge/odor/itching)    No availability within the next 2 weeks  Routing to PCP for further review/recommendations/orders.  Please advise on appt date/time      Leonora Babcock RN  [facility]  "

## 2020-10-05 ENCOUNTER — NURSE TRIAGE (OUTPATIENT)
Dept: NURSING | Facility: CLINIC | Age: 50
End: 2020-10-05

## 2020-10-05 NOTE — TELEPHONE ENCOUNTER
Detailed message left with information noted below from provider and number to return call with any questions.  MANINDER RodriguezN, RN  [facility]

## 2020-10-05 NOTE — TELEPHONE ENCOUNTER
Pt is calling in to let the PCP know that she took another pregnancy test today, and it was negative.     Please call patient with further suggestions [phone].    Ismael Newby RN on 10/5/2020 at 3:37 PM

## 2020-10-05 NOTE — TELEPHONE ENCOUNTER
No LMP recorded.     Please have pt do an otc pregnancy test at home to ensure not an unplanned pregnancy.  Could be start of perimenopausal irregularity of periods.  If pt wishes an appt , please offer her one in the next several weeks.

## 2020-10-07 NOTE — PATIENT INSTRUCTIONS
Patient Education     Perimenopause  Menopause is when you stop having periods for good. For many women, this happens around age 50. The time of change before this is called perimenopause. It may start in your late 30s or 40s. It can last for months or years. During this time, your body makes lower levels of female hormones. This causes certain changes in your body. You may already have begun to feel the effects of these changes. By taking steps to relieve symptoms, you can still feel good.    The menstrual cycle  Hormones are chemicals that have specific jobs in the body. A menstrual cycle is caused by changes in the levels of 2 female hormones. These hormones are estrogen and progesterone. They are made by the ovaries. In a normal cycle, estrogen creates a lining in the uterus to allow for pregnancy. The ovary then releases an egg. This is called ovulation. Progesterone levels start to go up. If the egg is not fertilized, progesterone levels go down. This causes the lining in the uterus to be shed. This is the bleeding that is your period.  Changes in hormones  As a woman ages, her ovaries begin to make hormones less regularly. In some months, there may not be ovulation. Without ovulation, progesterone isn't secreted so a normal period doesn't occur. The menstrual cycle will be harder to predict. Over time, the ovaries stop working. This can cause symptoms. Some women who have had their uterus taken out (hysterectomy) but still have ovaries may still have symptoms. When estrogen levels reach their lowest point, periods will stop completely. This is menopause.  Symptoms of perimenopause  The change in hormones can cause physical symptoms. It can also cause emotional symptoms. These may include:    Periods that come more or less often    Periods that are lighter or heavier than you re used to    Hot flashes, night sweats, or trouble sleeping    Vaginal dryness, which may make sex painful    Mood swings or  fatigue    Palpitations    Sleep disturbances    Urinary symptoms including frequency and incontinence  Medicines that may help  Some medicines can help ease the effects of perimenopause. These include:    Low-dose birth control pills. These often contain both estrogen and progesterone. They can help regulate your periods.    Hormone therapy (HT). This replaces some of the hormones your body has stopped making.    Antidepressants. These help balance brain chemicals that may decrease during this time. Signs of depression can include often feeling sad or hopeless. If you feel this way, be sure to talk to your healthcare provider.    Gabapentin. This is a medicine also used to treat seizures. This controls hot flashes and night sweats in some women.    Clonidine. This medicine may control hot flashes and night sweats.  Date Last Reviewed: 11/1/2017 2000-2019 [employer]. [address] [place] [address], [place] [address]. All rights reserved. This information is not intended as a substitute for professional medical care. Always follow your healthcare professional's instructions.           Patient Education     Coping with Perimenopause     Being active in ways you enjoy can help you feel better.     For most women, the symptoms of perimenopause subside after entering menopause, although hot flashes and vaginal dryness can continue after periods have stopped. In the meantime, symptoms can be relieved to help you feel better. Leading a healthy lifestyle can keep you feeling your best. The tips below can help. Doing things you enjoy and spending time with people you love are great ways to keep your spirits up as your body goes through the changes of perimenopause.  Menstrual changes    What happens: As hormone levels go down, periods can become irregular and hard to predict. These changes are often the first sign of perimenopause.    What you can do: Keep pads or tampons on hand in case your period comes  unexpectedly. You may also want to talk to your healthcare provider about taking birth control pills to help regulate your periods.  Hot flashes and night sweats    What happens: During a hot flash, you suddenly feel very warm. This may happen often, or just once in a while. Hot flashes at night may interrupt sleep. You may also wake up covered in sweat (night sweats).    What you can do: Wear layers that you can remove. Try all-cotton clothing, sheets, and blankets. At night, open your bedroom window. Keep a glass of water or small fan by your bed in case a hot flash wakes you up.  Bone changes    What happens: Lower levels of estrogen increase your risk of osteoporosis, a disease that weakens the bones. This can cause your bones to break more easily.    What you can do: Eating foods high in calcium and vitamin D helps protect your bones. Your healthcare provider may also suggest taking a calcium supplement. It's also helpful to quit smoking, if you smoke. Don't drink alcohol and get plenty of regular exercise.   Vaginal changes    What happens: In the absence of estrogen, the lining of the vagina can become thin and dry. This can make sex painful. Vaginal infections may also be more likely.    What you can do: Apply a water-based lubricant before having sex. If you are not using condoms or diaphragms for birth control, you can also use silicone-based lubricants, which don't have to be reapplied as often as water-based lubricants. Over-the-counter vaginal moisturizers can be used anytime, not just when you are having sex. You can also talk with your healthcare provider about using a vaginal cream that contains estrogen.  Mood swings    What happens: As hormone levels decrease, so do chemicals that affect your mood. Hot flashes and trouble sleeping can make mood swings worse. Your sex drive may also decrease.    What you can do: Understand that these feelings are common. Talk to your partner and to other women your  age about how you re feeling. Try exercising, which increases levels of mood-boosting chemicals in your body. If your mood swings are affecting your quality of life, talk with your healthcare provider about medicine or other options.  Stay active!  Activity can help you relax and gives you more energy. Exercise also helps keep bones and muscles strong. Staying fit may even give your sex drive a lift. Consider the following:    Weight-bearing activities, such as walking and jogging, help maintain bone density. This can help prevent osteoporosis.    Aerobic activities boost energy by moving oxygen through the body. Walking and jogging are aerobic. You might also try swimming or biking.    Sunlight helps raise levels of brain chemicals that boost your mood. Spending time outdoors can improve your mood, even on a cloudy day. Even a walk around the block can help!  If you re concerned about sex  You may notice that you re not as interested in sex as you used to be. This is very common during perimenopause. Since you re more likely to enjoy sex when you re comfortable, getting your symptoms under control might help. Talk to your healthcare provider about medicines or other options. And remember that there s more to sex than intercourse. Relax and take your time. Talk to your partner about trying new things to help get you aroused.  Date Last Reviewed: 9/1/2017 2000-2019 [employer]. [address] [place] [address], [place] [address]. All rights reserved. This information is not intended as a substitute for professional medical care. Always follow your healthcare professional's instructions.           Patient Education     Coping with Perimenopause     Being active in ways you enjoy can help you feel better.     For most women, the symptoms of perimenopause subside after entering menopause, although hot flashes and vaginal dryness can continue after periods have stopped. In the meantime, symptoms can be relieved to  help you feel better. Leading a healthy lifestyle can keep you feeling your best. The tips below can help. Doing things you enjoy and spending time with people you love are great ways to keep your spirits up as your body goes through the changes of perimenopause.  Menstrual changes    What happens: As hormone levels go down, periods can become irregular and hard to predict. These changes are often the first sign of perimenopause.    What you can do: Keep pads or tampons on hand in case your period comes unexpectedly. You may also want to talk to your healthcare provider about taking birth control pills to help regulate your periods.  Hot flashes and night sweats    What happens: During a hot flash, you suddenly feel very warm. This may happen often, or just once in a while. Hot flashes at night may interrupt sleep. You may also wake up covered in sweat (night sweats).    What you can do: Wear layers that you can remove. Try all-cotton clothing, sheets, and blankets. At night, open your bedroom window. Keep a glass of water or small fan by your bed in case a hot flash wakes you up.  Bone changes    What happens: Lower levels of estrogen increase your risk of osteoporosis, a disease that weakens the bones. This can cause your bones to break more easily.    What you can do: Eating foods high in calcium and vitamin D helps protect your bones. Your healthcare provider may also suggest taking a calcium supplement. It's also helpful to quit smoking, if you smoke. Don't drink alcohol and get plenty of regular exercise.   Vaginal changes    What happens: In the absence of estrogen, the lining of the vagina can become thin and dry. This can make sex painful. Vaginal infections may also be more likely.    What you can do: Apply a water-based lubricant before having sex. If you are not using condoms or diaphragms for birth control, you can also use silicone-based lubricants, which don't have to be reapplied as often as  water-based lubricants. Over-the-counter vaginal moisturizers can be used anytime, not just when you are having sex. You can also talk with your healthcare provider about using a vaginal cream that contains estrogen.  Mood swings    What happens: As hormone levels decrease, so do chemicals that affect your mood. Hot flashes and trouble sleeping can make mood swings worse. Your sex drive may also decrease.    What you can do: Understand that these feelings are common. Talk to your partner and to other women your age about how you re feeling. Try exercising, which increases levels of mood-boosting chemicals in your body. If your mood swings are affecting your quality of life, talk with your healthcare provider about medicine or other options.  Stay active!  Activity can help you relax and gives you more energy. Exercise also helps keep bones and muscles strong. Staying fit may even give your sex drive a lift. Consider the following:    Weight-bearing activities, such as walking and jogging, help maintain bone density. This can help prevent osteoporosis.    Aerobic activities boost energy by moving oxygen through the body. Walking and jogging are aerobic. You might also try swimming or biking.    Sunlight helps raise levels of brain chemicals that boost your mood. Spending time outdoors can improve your mood, even on a cloudy day. Even a walk around the block can help!  If you re concerned about sex  You may notice that you re not as interested in sex as you used to be. This is very common during perimenopause. Since you re more likely to enjoy sex when you re comfortable, getting your symptoms under control might help. Talk to your healthcare provider about medicines or other options. And remember that there s more to sex than intercourse. Relax and take your time. Talk to your partner about trying new things to help get you aroused.  Date Last Reviewed: 9/1/2017 2000-2019 [employer]. [address] [place] [address]  [place] [address]. All rights reserved. This information is not intended as a substitute for professional medical care. Always follow your healthcare professional's instructions.

## 2020-10-07 NOTE — TELEPHONE ENCOUNTER
Noted preg test at home negative x 2.  Most likely the start of perimenopause.  I  put handout reference on this to pt's letter's section of her mychart.       Please, call, if signs or symptoms worsen or fail to improve as anticipated.

## 2020-11-22 ENCOUNTER — HEALTH MAINTENANCE LETTER (OUTPATIENT)
Age: 50
End: 2020-11-22

## 2020-12-16 ENCOUNTER — OFFICE VISIT (OUTPATIENT)
Dept: FAMILY MEDICINE | Facility: CLINIC | Age: 50
End: 2020-12-16
Payer: COMMERCIAL

## 2020-12-16 VITALS
HEART RATE: 93 BPM | BODY MASS INDEX: 25.52 KG/M2 | HEIGHT: 63 IN | OXYGEN SATURATION: 100 % | DIASTOLIC BLOOD PRESSURE: 60 MMHG | WEIGHT: 144 LBS | SYSTOLIC BLOOD PRESSURE: 102 MMHG | TEMPERATURE: 96.6 F

## 2020-12-16 DIAGNOSIS — Z11.59 NEED FOR HEPATITIS C SCREENING TEST: ICD-10-CM

## 2020-12-16 DIAGNOSIS — Z12.11 SCREEN FOR COLON CANCER: ICD-10-CM

## 2020-12-16 DIAGNOSIS — Z00.01 ENCOUNTER FOR ROUTINE ADULT MEDICAL EXAM WITH ABNORMAL FINDINGS: Primary | ICD-10-CM

## 2020-12-16 DIAGNOSIS — Z12.31 VISIT FOR SCREENING MAMMOGRAM: ICD-10-CM

## 2020-12-16 DIAGNOSIS — D22.4 COMPOUND NEVUS OF NECK: ICD-10-CM

## 2020-12-16 DIAGNOSIS — E03.9 HYPOTHYROIDISM, UNSPECIFIED TYPE: ICD-10-CM

## 2020-12-16 DIAGNOSIS — D22.9 MULTIPLE PIGMENTED NEVI: ICD-10-CM

## 2020-12-16 DIAGNOSIS — T78.1XXA FOOD SENSITIVITY WITH GASTROINTESTINAL SYMPTOMS: ICD-10-CM

## 2020-12-16 DIAGNOSIS — E66.3 OVERWEIGHT (BMI 25.0-29.9): ICD-10-CM

## 2020-12-16 DIAGNOSIS — N64.4 PAINFUL LUMPY LEFT BREAST: ICD-10-CM

## 2020-12-16 DIAGNOSIS — E78.5 HYPERLIPIDEMIA LDL GOAL <160: ICD-10-CM

## 2020-12-16 DIAGNOSIS — N63.20 PAINFUL LUMPY LEFT BREAST: ICD-10-CM

## 2020-12-16 DIAGNOSIS — Z13.21 ENCOUNTER FOR VITAMIN DEFICIENCY SCREENING: ICD-10-CM

## 2020-12-16 DIAGNOSIS — K92.89 GAS BLOAT SYNDROME: ICD-10-CM

## 2020-12-16 LAB
ALBUMIN SERPL-MCNC: 3.7 G/DL (ref 3.4–5)
ALP SERPL-CCNC: 68 U/L (ref 40–150)
ALT SERPL W P-5'-P-CCNC: 43 U/L (ref 0–50)
ANION GAP SERPL CALCULATED.3IONS-SCNC: 2 MMOL/L (ref 3–14)
AST SERPL W P-5'-P-CCNC: 19 U/L (ref 0–45)
BILIRUB SERPL-MCNC: 0.2 MG/DL (ref 0.2–1.3)
BUN SERPL-MCNC: 17 MG/DL (ref 7–30)
CALCIUM SERPL-MCNC: 8.6 MG/DL (ref 8.5–10.1)
CHLORIDE SERPL-SCNC: 109 MMOL/L (ref 94–109)
CHOLEST SERPL-MCNC: 211 MG/DL
CO2 SERPL-SCNC: 27 MMOL/L (ref 20–32)
CREAT SERPL-MCNC: 0.84 MG/DL (ref 0.52–1.04)
ERYTHROCYTE [DISTWIDTH] IN BLOOD BY AUTOMATED COUNT: 12.6 % (ref 10–15)
GFR SERPL CREATININE-BSD FRML MDRD: 81 ML/MIN/{1.73_M2}
GLUCOSE SERPL-MCNC: 93 MG/DL (ref 70–99)
HCT VFR BLD AUTO: 40.7 % (ref 35–47)
HDLC SERPL-MCNC: 31 MG/DL
HGB BLD-MCNC: 13.1 G/DL (ref 11.7–15.7)
LDLC SERPL CALC-MCNC: 155 MG/DL
MCH RBC QN AUTO: 29.5 PG (ref 26.5–33)
MCHC RBC AUTO-ENTMCNC: 32.2 G/DL (ref 31.5–36.5)
MCV RBC AUTO: 92 FL (ref 78–100)
NONHDLC SERPL-MCNC: 180 MG/DL
PLATELET # BLD AUTO: 257 10E9/L (ref 150–450)
POTASSIUM SERPL-SCNC: 4.7 MMOL/L (ref 3.4–5.3)
PROT SERPL-MCNC: 7.8 G/DL (ref 6.8–8.8)
RBC # BLD AUTO: 4.44 10E12/L (ref 3.8–5.2)
SODIUM SERPL-SCNC: 138 MMOL/L (ref 133–144)
T3 SERPL-MCNC: 101 NG/DL (ref 60–181)
T4 FREE SERPL-MCNC: 1.53 NG/DL (ref 0.76–1.46)
TRIGL SERPL-MCNC: 126 MG/DL
TSH SERPL DL<=0.005 MIU/L-ACNC: 3.27 MU/L (ref 0.4–4)
WBC # BLD AUTO: 7.2 10E9/L (ref 4–11)

## 2020-12-16 PROCEDURE — 99214 OFFICE O/P EST MOD 30 MIN: CPT | Mod: 25 | Performed by: FAMILY MEDICINE

## 2020-12-16 PROCEDURE — 84443 ASSAY THYROID STIM HORMONE: CPT | Performed by: FAMILY MEDICINE

## 2020-12-16 PROCEDURE — 84439 ASSAY OF FREE THYROXINE: CPT | Performed by: FAMILY MEDICINE

## 2020-12-16 PROCEDURE — 85027 COMPLETE CBC AUTOMATED: CPT | Performed by: FAMILY MEDICINE

## 2020-12-16 PROCEDURE — 99396 PREV VISIT EST AGE 40-64: CPT | Performed by: FAMILY MEDICINE

## 2020-12-16 PROCEDURE — 80053 COMPREHEN METABOLIC PANEL: CPT | Performed by: FAMILY MEDICINE

## 2020-12-16 PROCEDURE — 82306 VITAMIN D 25 HYDROXY: CPT | Performed by: FAMILY MEDICINE

## 2020-12-16 PROCEDURE — 86803 HEPATITIS C AB TEST: CPT | Performed by: FAMILY MEDICINE

## 2020-12-16 PROCEDURE — 84480 ASSAY TRIIODOTHYRONINE (T3): CPT | Performed by: FAMILY MEDICINE

## 2020-12-16 PROCEDURE — 36415 COLL VENOUS BLD VENIPUNCTURE: CPT | Performed by: FAMILY MEDICINE

## 2020-12-16 PROCEDURE — 80061 LIPID PANEL: CPT | Performed by: FAMILY MEDICINE

## 2020-12-16 RX ORDER — LEVOTHYROXINE SODIUM 125 UG/1
125 TABLET ORAL DAILY
Qty: 90 TABLET | Refills: 3 | Status: SHIPPED | OUTPATIENT
Start: 2020-12-16 | End: 2021-05-04 | Stop reason: DRUGHIGH

## 2020-12-16 ASSESSMENT — MIFFLIN-ST. JEOR: SCORE: 1247.31

## 2020-12-16 NOTE — PROGRESS NOTES
SUBJECTIVE:   CC: Mildred Tellez is an 49 year old woman who presents for preventive health visit and the following other medical problems:      1. Encounter for routine adult medical exam with abnormal findings    2. Hypothyroidism, unspecified type    3. Compound nevus of neck - with dysplastic features - right neck 11/2013    4. Food sensitivity with gastrointestinal symptoms- plants in the nightshade family - tomatoes, potatoes, eggplant, tomatillos , peppers     5. Gas bloat syndrome    6. Hyperlipidemia LDL goal <160    7. Overweight (BMI 25.0-29.9)    8. Need for hepatitis C screening test    9. Encounter for vitamin deficiency screening    10. Visit for screening mammogram    11. Screen for colon cancer    12. Multiple pigmented nevi    13. Painful lumpy left breast       GI issues better lately with avoiding foods above.     Has lost some weight purposefully with eating better and avoiding foods that irritate her stomach and inflammatory foods -such as processed flours and processed sugars, etc.    Wt Readings from Last 5 Encounters:   12/16/20 65.3 kg (144 lb)   01/23/20 68.5 kg (151 lb)   09/20/19 68.9 kg (152 lb)   09/17/18 69.8 kg (153 lb 12.8 oz)   11/10/17 69.4 kg (153 lb)       Patient has been advised of split billing requirements and indicates understanding: Yes  Healthy Habits:    Getting at least 3 servings of Calcium per day:  Yes    Bi-annual eye exam:  Yes    Dental care twice a year:  Yes    Sleep apnea or symptoms of sleep apnea:  None    Diet:  Low fat/cholesterol    Frequency of exercise:: 3-4 days per week.    Duration of exercise:: weekends 90 min - week days 30-40 min.    Taking medications regularly:  Yes    Barriers to taking medications:  None    Medication side effects:  None    PHQ-2 Total Score:    Additional concerns today:  Yes (Left Breast harder than Right)      Hypothyroidism Follow-up      Since last visit, patient describes the following symptoms: dry skin, constipation  and hair loss    TSH   Date Value Ref Range Status   12/26/2019 2.81 0.40 - 4.00 mU/L Final   09/27/2019 0.31 (L) 0.40 - 4.00 mU/L Final   06/14/2019 1.28 0.40 - 4.00 mU/L Final   12/28/2018 1.06 0.40 - 4.00 mU/L Final   10/01/2018 2.67 0.40 - 4.00 mU/L Final     T4 Free   Date Value Ref Range Status   12/26/2019 1.25 0.76 - 1.46 ng/dL Final     Has been on same synthroid dose for many years. Has lost some weight above.  Needs refill on her thyroid medication.       Today's PHQ-2 Score: 0-0  PHQ-2 ( 1999 Pfizer) 9/17/2018   Q1: Little interest or pleasure in doing things 0   Q2: Feeling down, depressed or hopeless 0   PHQ-2 Score 0   Q1: Little interest or pleasure in doing things -   Q2: Feeling down, depressed or hopeless -   PHQ-2 Score -       Abuse: Current or Past (Physical, Sexual or Emotional) - No  Do you feel safe in your environment? Yes        Social History     Tobacco Use     Smoking status: Never Smoker     Smokeless tobacco: Never Used   Substance Use Topics     Alcohol use: Never     Alcohol/week: 0.0 standard drinks     Frequency: Never     If you drink alcohol do you typically have >3 drinks per day or >7 drinks per week? No    Alcohol Use 12/16/2020   Prescreen: >3 drinks/day or >7 drinks/week? No       Reviewed orders with patient.  Reviewed health maintenance and updated orders accordingly - Yes  Lab work is in process  Labs reviewed in EPIC  BP Readings from Last 3 Encounters:   12/16/20 102/60   01/23/20 100/58   09/20/19 112/62    Wt Readings from Last 3 Encounters:   12/16/20 65.3 kg (144 lb)   01/23/20 68.5 kg (151 lb)   09/20/19 68.9 kg (152 lb)                  Patient Active Problem List   Diagnosis     Hypothyroidism, unspecified type     Rosacea     Hyperlipidemia LDL goal <160     RLQ abdominal pain - when getting up from sitting in area of appy     Abnormal Pap smear, can't excl hi gd sq intraepithelial lesion (ASC-H)     Mastodynia- left breast     Compound nevus of neck - with  dysplastic features - right neck 2013     ASCUS pap -  2013 - saw Dr. Beltran ob/gyn [place]      History of dysplastic nevus     Coxsackie virus infection - severe - summer of  - hands and feet totally peeled and oral lesions very painful     Cervicalgia     Gas bloat syndrome     Overweight (BMI 25.0-29.9)     Food sensitivity with gastrointestinal symptoms- plants in the nightshade family - tomatoes, potatoes, eggplant, tomatillos , peppers      Past Surgical History:   Procedure Laterality Date     C APPENDECTOMY         Social History     Tobacco Use     Smoking status: Never Smoker     Smokeless tobacco: Never Used   Substance Use Topics     Alcohol use: Never     Alcohol/week: 0.0 standard drinks     Frequency: Never     Family History   Problem Relation Age of Onset     Hypertension Mother      Hypertension Maternal Grandmother      Diabetes Paternal Grandmother          of complications fr. diabetes     C.A.D. Father         on blood thinners for his PAD     Cardiovascular Father         peripheral vascular disease- very heavy smoker      Lung Cancer Father      Breast Cancer Other         paternal great aunt         Current Outpatient Medications   Medication Sig Dispense Refill     levothyroxine (SYNTHROID/LEVOTHROID) 125 MCG tablet TAKE 1 TABLET BY MOUTH DAILY 90 tablet 1     No Known Allergies  Recent Labs   Lab Test 19  1442 19  0922 10/01/18  0922 10/01/18  0922 09/15/17  0900 09/15/17  0859   LDL  --  113*  --  128*  --  117*   HDL  --  34*  --  40*  --  41*   TRIG  --  116  --  101  --  101   ALT  --  23  --  27 24  --    CR  --  0.69  --  0.77 0.75  --    GFRESTIMATED  --  >90  --  80 83  --    GFRESTBLACK  --  >90  --  >90 >90  --    POTASSIUM  --  4.1  --  4.5 4.2  --    TSH 2.81 0.31*   < > 2.67  --  2.61    < > = values in this interval not displayed.        Mammogram Screening: Patient under age 50, mutual decision reflected in health maintenance.      Pertinent  mammograms are reviewed under the imaging tab.  History of abnormal Pap smear: YES - updated in Problem List and Health Maintenance accordingly  PAP / HPV Latest Ref Rng & Units 2018 9/10/2016 2015   PAP - NIL NIL ASC-US(A)   HPV 16 DNA NEG:Negative Negative Negative Negative   HPV 18 DNA NEG:Negative Negative Negative Negative   OTHER HR HPV NEG:Negative Negative Negative Negative     Reviewed and updated as needed this visit by clinical staff  Tobacco  Allergies    Med Hx  Surg Hx  Fam Hx  Soc Hx        Reviewed and updated as needed this visit by Provider                OB History    Para Term  AB Living   2 2 2 0 0 2   SAB TAB Ectopic Multiple Live Births   0 0 0 0 0      # Outcome Date GA Lbr Rudi/2nd Weight Sex Delivery Anes PTL Lv   2 Term               Birth Comments:    1 Term               Birth Comments: prolonged ROM- postpartum endometritis -       Obstetric Comments   First pregnancy - Moiz - developed chorioamnionitis & postpartum endometritis despite IV abx from prolonged rupture = 30 hrs.  Also had partially retained placenta that she passed on her own 3 days s/p delivery.       No problems with second = Sherie.       Review of Systems  CONSTITUTIONAL: NEGATIVE for fever, chills, change in weight  INTEGUMENTARU/SKIN: NEGATIVE for worrisome rashes, moles or lesions  EYES: NEGATIVE for vision changes or irritation  ENT: NEGATIVE for ear, mouth and throat problems  RESP: NEGATIVE for significant cough or SOB  BREAST: NEGATIVE for masses, tenderness or discharge  CV: NEGATIVE for chest pain, palpitations or peripheral edema  GI: NEGATIVE for nausea, abdominal pain, heartburn, or change in bowel habits  : NEGATIVE for unusual urinary or vaginal symptoms. Periods are regular.  MUSCULOSKELETAL: NEGATIVE for significant arthralgias or myalgia  NEURO: NEGATIVE for weakness, dizziness or paresthesias  ENDOCRINE: NEGATIVE for temperature intolerance, skin/hair  "changes  HEME/ALLERGY/IMMUNE: NEGATIVE for bleeding problems  PSYCHIATRIC: NEGATIVE for changes in mood or affect     OBJECTIVE:   /60 (BP Location: Left arm, Patient Position: Chair, Cuff Size: Adult Regular)   Pulse 93   Temp 96.6  F (35.9  C) (Tympanic)   Ht 1.6 m (5' 3\")   Wt 65.3 kg (144 lb)   LMP 11/08/2020   SpO2 100%   Breastfeeding No   BMI 25.51 kg/m    Physical Exam  GENERAL: healthy, alert and no distress  EYES: Eyes grossly normal to inspection, PERRL and conjunctivae and sclerae normal  HENT: ear canals and TM's normal, nose and mouth without ulcers or lesions  NECK: no adenopathy, no asymmetry, masses, or scars and thyroid normal to palpation  RESP: lungs clear to auscultation - no rales, rhonchi or wheezes  BREAST:there is a painful thickening in  left breast - from 12-3:00- feels like dense fibrocystic change compared to the right similar area - otherwise normal without masses, tenderness or nipple discharge and no palpable axillary masses or adenopathy  CV: regular rate and rhythm, normal S1 S2, no S3 or S4, no murmur, click or rub, no peripheral edema and peripheral pulses strong  ABDOMEN: soft, nontender, no hepatosplenomegaly, no masses and bowel sounds normal  MS: no gross musculoskeletal defects noted, no edema  SKIN: no suspicious lesions or rashes, but has many pigmented nevi   NEURO: Normal strength and tone, mentation intact and speech normal  PSYCH: mentation appears normal, affect normal/bright    Diagnostic Test Results:  Labs reviewed in Epic    ASSESSMENT/PLAN:       ICD-10-CM    1. Encounter for routine adult medical exam with abnormal findings  Z00.01 REVIEW OF HEALTH MAINTENANCE PROTOCOL ORDERS   2. Hypothyroidism, unspecified type  E03.9 levothyroxine (SYNTHROID/LEVOTHROID) 125 MCG tablet     T3 total     TSH     T4 FREE     OFFICE/OUTPT VISIT,EST,LEVL IV   3. Compound nevus of neck - with dysplastic features - right neck 11/2013  D22.4    4. Food sensitivity with " "gastrointestinal symptoms- plants in the nightshade family - tomatoes, potatoes, eggplant, tomatillos , peppers   T78.1XXA OFFICE/OUTPT VISIT,EST,LEVL IV   5. Gas bloat syndrome  K92.89 OFFICE/OUTPT VISIT,EST,LEVL IV   6. Hyperlipidemia LDL goal <160  E78.5 Comprehensive metabolic panel     CBC with platelets     Lipid panel reflex to direct LDL Fasting   7. Overweight (BMI 25.0-29.9)  E66.3    8. Need for hepatitis C screening test  Z11.59 Hepatitis C Screen Reflex to HCV RNA Quant and Genotype     CANCELED: MA Screen Bilateral w/Justin   9. Encounter for vitamin deficiency screening  Z13.21 25 Hydroxyvitamin D2 and D3   10. Visit for screening mammogram  Z12.31 MA Screen Right w/Justin   11. Screen for colon cancer  Z12.11 GASTROENTEROLOGY ADULT REF PROCEDURE ONLY   12. Multiple pigmented nevi  D22.9 SKIN CARE REFERRAL     OFFICE/OUTPT VISIT,EST,LEVL IV   13. Painful lumpy left breast  N64.4 US Breast Left Complete 4 Quadrants    N63.20 MA Diagnostic Left w/Justin     OFFICE/OUTPT VISIT,EST,LEVL IV     Pt will turn 50 in 4 days - ordered colonoscopy.     Patient has been advised of split billing requirements and indicates understanding: Yes  COUNSELING:  Reviewed preventive health counseling, as reflected in patient instructions    Estimated body mass index is 25.51 kg/m  as calculated from the following:    Height as of this encounter: 1.6 m (5' 3\").    Weight as of this encounter: 65.3 kg (144 lb).        She reports that she has never smoked. She has never used smokeless tobacco.    Please, call or return to clinic or go to the ER immediately if signs or symptoms worsen or fail to improve as anticipated.     Counseling Resources:  ATP IV Guidelines  Pooled Cohorts Equation Calculator  Breast Cancer Risk Calculator  BRCA-Related Cancer Risk Assessment: FHS-7 Tool  FRAX Risk Assessment  ICSI Preventive Guidelines  Dietary Guidelines for Americans, 2010  USDA's MyPlate  ASA Prophylaxis  Lung CA Screening         Mary " SANTANA Langston MD  [facility]

## 2020-12-16 NOTE — PATIENT INSTRUCTIONS
[facility]  [address] [place] [address]  Office: [phone]   Fax:    [phone]         Preventive Health Recommendations  Female Ages 40 to 49    Yearly exam:     See your health care provider every year in order to  1. Review health changes.   2. Discuss preventive care.    3. Review your medicines if your doctor prescribed any.      Get a Pap test every three years (unless you have an abnormal result and your provider advises testing more often).      If you get Pap tests with HPV test, you only need to test every 5 years, unless you have an abnormal result. You do not need a Pap test if your uterus was removed (hysterectomy) and you have not had cancer.      You should be tested each year for STDs (sexually transmitted diseases), if you're at risk.     Ask your doctor if you should have a mammogram.      Have a colonoscopy (test for colon cancer) if someone in your family has had colon cancer or polyps before age 50.       Have a cholesterol test every 5 years.       Have a diabetes test (fasting glucose) after age 45. If you are at risk for diabetes, you should have this test every 3 years.    Shots: Get a flu shot each year. Get a tetanus shot every 10 years.     Nutrition:     Eat at least 5 servings of fruits and vegetables each day.    Eat whole-grain bread, whole-wheat pasta and brown rice instead of white grains and rice.    Get adequate Calcium and Vitamin D.        Lifestyle    Exercise at least 150 minutes a week (an average of 30 minutes a day, 5 days a week). This will help you control your weight and prevent disease.    Limit alcohol to one drink per day.    No smoking.     Wear sunscreen to prevent skin cancer.    See your dentist every six months for an exam and cleaning.      Thank you so much or choosing [facility]  for your Health Care. It was a pleasure seeing you at your visit today! Please contact us with any questions  or concerns you may have.                   Mary Langston MD                              To reach your [facility] team after hours call:   [phone]    PLEASE NOTE OUR HOURS HAVE CHANGED secondary to COVID-19 coronavirus pandemic, as we are trying to minimize patient exposure to the virus,  which is now widespread in the state.  These hours may change with very little notice.  We apologize for any inconvenience.       Our current clinic hours are:          Monday- Thursday   7:00am - 6:00pm  in person.      Friday  7:00am- 5:00pm                       Saturday and Sunday : Closed to in person and virtual visits        We have telephone and virtual visit times available between    7:00am - 6pm on Monday-Friday as well.                                                Phone:  [phone]      Our pharmacy hours:   Monday  9:00 am to 6:00 pm      Tuesday through Friday 9:00am to 5:00pm                        Saturday - 9:00 am to 12 noon       Sunday : Closed.              Phone:  [phone]    ###  Please note: at this time we are not accepting any walk-in visits. ###      There is also information available at our web site:  [email or web address]    If your provider ordered any lab tests and you do not receive the results within 10 business days, please call the clinic.    If you need a medication refill please contact your pharmacy.  Please allow 2 business days for your refill to be completed.    Our clinic offers telephone visits and e visits.  Please ask one of your team members to explain more.      Use [employer] (secure email communication and access to your chart) to send your primary care provider a message or make an appointment. Ask someone on your Team how to sign up for [employer].

## 2020-12-17 LAB
DEPRECATED CALCIDIOL+CALCIFEROL SERPL-MC: <40 UG/L (ref 20–75)
HCV AB SERPL QL IA: NONREACTIVE
VITAMIN D2 SERPL-MCNC: <5 UG/L
VITAMIN D3 SERPL-MCNC: 35 UG/L

## 2020-12-28 PROBLEM — D22.9 MULTIPLE PIGMENTED NEVI: Status: ACTIVE | Noted: 2020-12-28

## 2021-01-06 ENCOUNTER — HOSPITAL ENCOUNTER (OUTPATIENT)
Dept: MAMMOGRAPHY | Facility: CLINIC | Age: 51
End: 2021-01-06
Attending: FAMILY MEDICINE
Payer: COMMERCIAL

## 2021-01-06 ENCOUNTER — HOSPITAL ENCOUNTER (OUTPATIENT)
Dept: ULTRASOUND IMAGING | Facility: CLINIC | Age: 51
End: 2021-01-06
Attending: FAMILY MEDICINE
Payer: COMMERCIAL

## 2021-01-06 DIAGNOSIS — N64.4 PAINFUL LUMPY LEFT BREAST: ICD-10-CM

## 2021-01-06 DIAGNOSIS — N63.20 PAINFUL LUMPY LEFT BREAST: ICD-10-CM

## 2021-01-06 PROCEDURE — 76642 ULTRASOUND BREAST LIMITED: CPT | Mod: LT

## 2021-01-06 PROCEDURE — G0279 TOMOSYNTHESIS, MAMMO: HCPCS

## 2021-04-21 DIAGNOSIS — E03.9 HYPOTHYROIDISM, UNSPECIFIED TYPE: ICD-10-CM

## 2021-04-21 DIAGNOSIS — E78.5 HYPERLIPIDEMIA LDL GOAL <160: ICD-10-CM

## 2021-04-21 LAB
CHOLEST SERPL-MCNC: 208 MG/DL
HDLC SERPL-MCNC: 43 MG/DL
LDLC SERPL CALC-MCNC: 145 MG/DL
NONHDLC SERPL-MCNC: 165 MG/DL
T3 SERPL-MCNC: 90 NG/DL (ref 60–181)
T4 FREE SERPL-MCNC: 1.24 NG/DL (ref 0.76–1.46)
TRIGL SERPL-MCNC: 101 MG/DL
TSH SERPL DL<=0.005 MIU/L-ACNC: 7.01 MU/L (ref 0.4–4)

## 2021-04-21 PROCEDURE — 84443 ASSAY THYROID STIM HORMONE: CPT | Performed by: FAMILY MEDICINE

## 2021-04-21 PROCEDURE — 84439 ASSAY OF FREE THYROXINE: CPT | Performed by: FAMILY MEDICINE

## 2021-04-21 PROCEDURE — 84480 ASSAY TRIIODOTHYRONINE (T3): CPT | Performed by: FAMILY MEDICINE

## 2021-04-21 PROCEDURE — 80061 LIPID PANEL: CPT | Performed by: FAMILY MEDICINE

## 2021-04-21 PROCEDURE — 36415 COLL VENOUS BLD VENIPUNCTURE: CPT | Performed by: FAMILY MEDICINE

## 2021-05-04 DIAGNOSIS — E03.9 HYPOTHYROIDISM, UNSPECIFIED TYPE: Primary | ICD-10-CM

## 2021-05-04 RX ORDER — LEVOTHYROXINE SODIUM 137 UG/1
137 TABLET ORAL DAILY
Qty: 90 TABLET | Refills: 1 | Status: SHIPPED | OUTPATIENT
Start: 2021-05-04 | End: 2021-10-10

## 2021-06-08 ENCOUNTER — TELEPHONE (OUTPATIENT)
Dept: FAMILY MEDICINE | Facility: CLINIC | Age: 51
End: 2021-06-08

## 2021-06-08 DIAGNOSIS — E78.5 HYPERLIPIDEMIA LDL GOAL <160: Primary | ICD-10-CM

## 2021-06-08 NOTE — TELEPHONE ENCOUNTER
Reason for call:  Order   Order or referral being requested: Cholesterol Lab Order   Reason for request: Patient requesting to check Cholesterol Levels  Date needed: Patient is scheduled 6/23/21 with lab  Has the patient been seen by the PCP for this problem? YES        Phone number to reach patient:  Home number on file [phone] (home)    Best Time:  Any time     Can we leave a detailed message on this number?  Yes

## 2021-06-11 NOTE — TELEPHONE ENCOUNTER
Recent Labs   Lab Test 04/21/21  0836 12/16/20  0916 07/06/15  1046 07/06/15  1046 10/10/14  0955   CHOL 208* 211*   < > 183 172   HDL 43* 31*   < > 45* 41*   * 155*   < > 119 115   TRIG 101 126   < > 95 82   CHOLHDLRATIO  --   --   --  4.1 4.2    < > = values in this interval not displayed.     Future orders placed in [facility] for labs.   TSH   Date Value Ref Range Status   04/21/2021 7.01 (H) 0.40 - 4.00 mU/L Final

## 2021-06-23 DIAGNOSIS — E03.9 HYPOTHYROIDISM, UNSPECIFIED TYPE: ICD-10-CM

## 2021-06-23 DIAGNOSIS — E78.5 HYPERLIPIDEMIA LDL GOAL <160: ICD-10-CM

## 2021-06-23 LAB
ALT SERPL W P-5'-P-CCNC: 25 U/L (ref 0–50)
AST SERPL W P-5'-P-CCNC: 7 U/L (ref 0–45)
CHOLEST SERPL-MCNC: 200 MG/DL
HDLC SERPL-MCNC: 39 MG/DL
LDLC SERPL CALC-MCNC: 139 MG/DL
NONHDLC SERPL-MCNC: 161 MG/DL
T3 SERPL-MCNC: 99 NG/DL (ref 60–181)
T4 FREE SERPL-MCNC: 1.36 NG/DL (ref 0.76–1.46)
TRIGL SERPL-MCNC: 109 MG/DL
TSH SERPL DL<=0.005 MIU/L-ACNC: 0.74 MU/L (ref 0.4–4)

## 2021-06-23 PROCEDURE — 84443 ASSAY THYROID STIM HORMONE: CPT | Performed by: FAMILY MEDICINE

## 2021-06-23 PROCEDURE — 36415 COLL VENOUS BLD VENIPUNCTURE: CPT | Performed by: FAMILY MEDICINE

## 2021-06-23 PROCEDURE — 84480 ASSAY TRIIODOTHYRONINE (T3): CPT | Performed by: FAMILY MEDICINE

## 2021-06-23 PROCEDURE — 84460 ALANINE AMINO (ALT) (SGPT): CPT | Performed by: FAMILY MEDICINE

## 2021-06-23 PROCEDURE — 84439 ASSAY OF FREE THYROXINE: CPT | Performed by: FAMILY MEDICINE

## 2021-06-23 PROCEDURE — 80061 LIPID PANEL: CPT | Performed by: FAMILY MEDICINE

## 2021-06-23 PROCEDURE — 84450 TRANSFERASE (AST) (SGOT): CPT | Performed by: FAMILY MEDICINE

## 2021-09-19 ENCOUNTER — HEALTH MAINTENANCE LETTER (OUTPATIENT)
Age: 51
End: 2021-09-19

## 2021-10-07 DIAGNOSIS — E03.9 HYPOTHYROIDISM, UNSPECIFIED TYPE: ICD-10-CM

## 2021-10-10 RX ORDER — LEVOTHYROXINE SODIUM 137 UG/1
137 TABLET ORAL DAILY
Qty: 90 TABLET | Refills: 0 | Status: SHIPPED | OUTPATIENT
Start: 2021-10-10 | End: 2022-01-27

## 2021-11-03 ENCOUNTER — OFFICE VISIT (OUTPATIENT)
Dept: OPTOMETRY | Facility: CLINIC | Age: 51
End: 2021-11-03
Payer: COMMERCIAL

## 2021-11-03 DIAGNOSIS — H52.4 PRESBYOPIA: Primary | ICD-10-CM

## 2021-11-03 PROCEDURE — 92004 COMPRE OPH EXAM NEW PT 1/>: CPT | Performed by: OPTOMETRIST

## 2021-11-03 PROCEDURE — 92015 DETERMINE REFRACTIVE STATE: CPT | Performed by: OPTOMETRIST

## 2021-11-03 ASSESSMENT — REFRACTION_MANIFEST
OS_ADD: +2.25
OD_AXIS: 100
OS_CYLINDER: SPHERE
OS_CYLINDER: SPHERE
OD_CYLINDER: +0.25
OD_AXIS: 092
METHOD_AUTOREFRACTION: 1
OD_ADD: +2.25
OD_SPHERE: -0.25
OD_CYLINDER: +0.50
OS_SPHERE: +0.25
OS_SPHERE: PLANO
OD_SPHERE: +0.25

## 2021-11-03 ASSESSMENT — REFRACTION_WEARINGRX
OD_ADD: +1.00
OD_CYLINDER: +0.25
OS_SPHERE: +0.75
OS_CYLINDER: +0.25
OD_AXIS: 120
OS_ADD: +1.00
OD_SPHERE: +1.00
OS_AXIS: 090

## 2021-11-03 ASSESSMENT — VISUAL ACUITY
OD_SC: 20/20
OS_SC: 20/20
METHOD: SNELLEN - LINEAR
OD_CC: 20/30
CORRECTION_TYPE: GLASSES
OS_CC: 20/30

## 2021-11-03 ASSESSMENT — SLIT LAMP EXAM - LIDS
COMMENTS: NORMAL
COMMENTS: NORMAL

## 2021-11-03 ASSESSMENT — CONF VISUAL FIELD
OD_NORMAL: 1
OS_NORMAL: 1
METHOD: COUNTING FINGERS

## 2021-11-03 ASSESSMENT — EXTERNAL EXAM - LEFT EYE: OS_EXAM: NORMAL

## 2021-11-03 ASSESSMENT — CUP TO DISC RATIO
OS_RATIO: 0.15
OD_RATIO: 0.25

## 2021-11-03 ASSESSMENT — TONOMETRY
OD_IOP_MMHG: 15
IOP_METHOD: APPLANATION
OS_IOP_MMHG: 15

## 2021-11-03 ASSESSMENT — EXTERNAL EXAM - RIGHT EYE: OD_EXAM: NORMAL

## 2021-11-03 NOTE — LETTER
11/3/2021         RE: Mildred Tellez  [address] [place] [facility] [address]        Dear Colleague,    Thank you for referring your patient, Mildred Tellez, to the [facility]. Please see a copy of my visit note below.    Chief Complaint   Patient presents with     Annual Eye Exam      Stable vision  Glasses are 2 years old. Computer glasses.  When she closed her eyes too tight in the dark she saw light circles in her eyes that lasted a minute or two. This happened once. No headache   Her thyroid was not controlled at the time   No floaters  No other concerns at this time.    Last Eye Exam: 2020, outside [facility]  Dilated Previously: Yes. Signs and symptoms of dilation were discussed. Patient consents to dilation today.    What are you currently using to see?  glasses       Distance Vision Acuity: Satisfied with vision    Near Vision Acuity: Satisfied with vision while reading and using computer with glasses  Looks through the bifocal for computer    Eye Comfort: good  Do you use eye drops? : No  Software op for [facility]    Geovanna Tesfaye CPO      fox glaucoma     Medical, surgical and family histories reviewed and updated 11/3/2021.       OBJECTIVE: See Ophthalmology exam    ASSESSMENT:    ICD-10-CM    1. Presbyopia  H52.4       PLAN:   You could use over the counter readers for computer +1.75  And over the counter reading as needed +2.50  Or single vision computer      Christianne Beltran OD       Again, thank you for allowing me to participate in the care of your patient.        Sincerely,        Christianne Beltran, OD

## 2021-11-03 NOTE — PROGRESS NOTES
Chief Complaint   Patient presents with     Annual Eye Exam      Stable vision  Glasses are 2 years old. Computer glasses.  When she closed her eyes too tight in the dark she saw light circles in her eyes that lasted a minute or two. This happened once. No headache   Her thyroid was not controlled at the time   No floaters no double vision but was obstructing to her vision for a while  No other concerns at this time.    Last Eye Exam: 2020, outside [facility]  Dilated Previously: Yes. Signs and symptoms of dilation were discussed. Patient consents to dilation today.    What are you currently using to see?  glasses       Distance Vision Acuity: Satisfied with vision    Near Vision Acuity: Satisfied with vision while reading and using computer with glasses  Looks through the bifocal for computer    Eye Comfort: good  Do you use eye drops? : No  [facility] op for [facility]    Geovanna Tesfaye CPO      fox glaucoma     Medical, surgical and family histories reviewed and updated 11/3/2021.       OBJECTIVE: See Ophthalmology exam    ASSESSMENT:    ICD-10-CM    1. Presbyopia  H52.4       PLAN:   You could use over the counter readers for computer +1.75  And over the counter reading as needed +2.50  Or single vision computer      Christianne Beltran OD

## 2021-12-20 ENCOUNTER — OFFICE VISIT (OUTPATIENT)
Dept: FAMILY MEDICINE | Facility: CLINIC | Age: 51
End: 2021-12-20
Payer: COMMERCIAL

## 2021-12-20 VITALS
OXYGEN SATURATION: 99 % | TEMPERATURE: 96.7 F | HEIGHT: 63 IN | WEIGHT: 147 LBS | SYSTOLIC BLOOD PRESSURE: 100 MMHG | HEART RATE: 88 BPM | BODY MASS INDEX: 26.05 KG/M2 | DIASTOLIC BLOOD PRESSURE: 62 MMHG

## 2021-12-20 DIAGNOSIS — Z12.31 VISIT FOR SCREENING MAMMOGRAM: ICD-10-CM

## 2021-12-20 DIAGNOSIS — N90.7 VULVAR CYSTS: ICD-10-CM

## 2021-12-20 DIAGNOSIS — E03.9 HYPOTHYROIDISM, UNSPECIFIED TYPE: ICD-10-CM

## 2021-12-20 DIAGNOSIS — Z00.00 ROUTINE GENERAL MEDICAL EXAMINATION AT A HEALTH CARE FACILITY: Primary | ICD-10-CM

## 2021-12-20 DIAGNOSIS — Z86.018 HISTORY OF DYSPLASTIC NEVUS: ICD-10-CM

## 2021-12-20 DIAGNOSIS — E78.5 HYPERLIPIDEMIA LDL GOAL <160: ICD-10-CM

## 2021-12-20 DIAGNOSIS — G89.29 CHRONIC RIGHT-SIDED LOW BACK PAIN WITH RIGHT-SIDED SCIATICA: ICD-10-CM

## 2021-12-20 DIAGNOSIS — M54.41 CHRONIC RIGHT-SIDED LOW BACK PAIN WITH RIGHT-SIDED SCIATICA: ICD-10-CM

## 2021-12-20 DIAGNOSIS — M25.572 CHRONIC PAIN OF BOTH ANKLES: ICD-10-CM

## 2021-12-20 DIAGNOSIS — D22.9 MULTIPLE PIGMENTED NEVI: ICD-10-CM

## 2021-12-20 DIAGNOSIS — R87.610 PAPANICOLAOU SMEAR OF CERVIX WITH ATYPICAL SQUAMOUS CELLS OF UNDETERMINED SIGNIFICANCE (ASC-US): ICD-10-CM

## 2021-12-20 DIAGNOSIS — Z12.4 SCREENING FOR MALIGNANT NEOPLASM OF CERVIX: ICD-10-CM

## 2021-12-20 DIAGNOSIS — Z12.11 SCREEN FOR COLON CANCER: ICD-10-CM

## 2021-12-20 DIAGNOSIS — E66.3 OVERWEIGHT (BMI 25.0-29.9): ICD-10-CM

## 2021-12-20 DIAGNOSIS — G89.29 CHRONIC PAIN OF BOTH ANKLES: ICD-10-CM

## 2021-12-20 DIAGNOSIS — Z13.21 ENCOUNTER FOR VITAMIN DEFICIENCY SCREENING: ICD-10-CM

## 2021-12-20 DIAGNOSIS — M25.571 CHRONIC PAIN OF BOTH ANKLES: ICD-10-CM

## 2021-12-20 PROBLEM — N64.4 PAINFUL LUMPY LEFT BREAST: Status: ACTIVE | Noted: 2020-12-28

## 2021-12-20 PROBLEM — N63.20 PAINFUL LUMPY LEFT BREAST: Status: ACTIVE | Noted: 2020-12-28

## 2021-12-20 LAB
ALBUMIN SERPL-MCNC: 3.7 G/DL (ref 3.4–5)
ALP SERPL-CCNC: 44 U/L (ref 40–150)
ALT SERPL W P-5'-P-CCNC: 18 U/L (ref 0–50)
ANION GAP SERPL CALCULATED.3IONS-SCNC: 6 MMOL/L (ref 3–14)
AST SERPL W P-5'-P-CCNC: 8 U/L (ref 0–45)
BILIRUB SERPL-MCNC: 0.3 MG/DL (ref 0.2–1.3)
BUN SERPL-MCNC: 16 MG/DL (ref 7–30)
CALCIUM SERPL-MCNC: 8.3 MG/DL (ref 8.5–10.1)
CHLORIDE BLD-SCNC: 107 MMOL/L (ref 94–109)
CO2 SERPL-SCNC: 25 MMOL/L (ref 20–32)
CREAT SERPL-MCNC: 0.72 MG/DL (ref 0.52–1.04)
DEPRECATED CALCIDIOL+CALCIFEROL SERPL-MC: 37 UG/L (ref 20–75)
ERYTHROCYTE [DISTWIDTH] IN BLOOD BY AUTOMATED COUNT: 12.7 % (ref 10–15)
GFR SERPL CREATININE-BSD FRML MDRD: >90 ML/MIN/1.73M2
GLUCOSE BLD-MCNC: 82 MG/DL (ref 70–99)
HCT VFR BLD AUTO: 40.3 % (ref 35–47)
HGB BLD-MCNC: 13.5 G/DL (ref 11.7–15.7)
MCH RBC QN AUTO: 31.4 PG (ref 26.5–33)
MCHC RBC AUTO-ENTMCNC: 33.5 G/DL (ref 31.5–36.5)
MCV RBC AUTO: 94 FL (ref 78–100)
PLATELET # BLD AUTO: 314 10E3/UL (ref 150–450)
POTASSIUM BLD-SCNC: 4.3 MMOL/L (ref 3.4–5.3)
PROT SERPL-MCNC: 7.3 G/DL (ref 6.8–8.8)
RBC # BLD AUTO: 4.3 10E6/UL (ref 3.8–5.2)
SODIUM SERPL-SCNC: 138 MMOL/L (ref 133–144)
T3 SERPL-MCNC: 81 NG/DL (ref 60–181)
T4 FREE SERPL-MCNC: 1.55 NG/DL (ref 0.76–1.46)
TSH SERPL DL<=0.005 MIU/L-ACNC: 0.46 MU/L (ref 0.4–4)
WBC # BLD AUTO: 8.8 10E3/UL (ref 4–11)

## 2021-12-20 PROCEDURE — 82306 VITAMIN D 25 HYDROXY: CPT | Performed by: FAMILY MEDICINE

## 2021-12-20 PROCEDURE — 80053 COMPREHEN METABOLIC PANEL: CPT | Performed by: FAMILY MEDICINE

## 2021-12-20 PROCEDURE — 36415 COLL VENOUS BLD VENIPUNCTURE: CPT | Performed by: FAMILY MEDICINE

## 2021-12-20 PROCEDURE — 99396 PREV VISIT EST AGE 40-64: CPT | Performed by: FAMILY MEDICINE

## 2021-12-20 PROCEDURE — 84439 ASSAY OF FREE THYROXINE: CPT | Performed by: FAMILY MEDICINE

## 2021-12-20 PROCEDURE — 80061 LIPID PANEL: CPT | Performed by: FAMILY MEDICINE

## 2021-12-20 PROCEDURE — 85027 COMPLETE CBC AUTOMATED: CPT | Performed by: FAMILY MEDICINE

## 2021-12-20 PROCEDURE — 87624 HPV HI-RISK TYP POOLED RSLT: CPT | Performed by: FAMILY MEDICINE

## 2021-12-20 PROCEDURE — 84443 ASSAY THYROID STIM HORMONE: CPT | Performed by: FAMILY MEDICINE

## 2021-12-20 PROCEDURE — 84480 ASSAY TRIIODOTHYRONINE (T3): CPT | Performed by: FAMILY MEDICINE

## 2021-12-20 PROCEDURE — 99213 OFFICE O/P EST LOW 20 MIN: CPT | Mod: 25 | Performed by: FAMILY MEDICINE

## 2021-12-20 PROCEDURE — G0145 SCR C/V CYTO,THINLAYER,RESCR: HCPCS | Performed by: FAMILY MEDICINE

## 2021-12-20 ASSESSMENT — ENCOUNTER SYMPTOMS
HEADACHES: 0
WEAKNESS: 0
COUGH: 0
HEARTBURN: 0
MYALGIAS: 0
HEMATURIA: 0
SHORTNESS OF BREATH: 0
FREQUENCY: 0
DIARRHEA: 0
JOINT SWELLING: 0
CHILLS: 0
BREAST MASS: 0
PALPITATIONS: 0
ARTHRALGIAS: 0
PARESTHESIAS: 0
SORE THROAT: 0
ABDOMINAL PAIN: 0
DYSURIA: 0
EYE PAIN: 0
CONSTIPATION: 0
NAUSEA: 0
FEVER: 0
HEMATOCHEZIA: 0
DIZZINESS: 0
NERVOUS/ANXIOUS: 0

## 2021-12-20 ASSESSMENT — MIFFLIN-ST. JEOR: SCORE: 1250.92

## 2021-12-20 NOTE — PATIENT INSTRUCTIONS
[facility]  [address] [place] [id number]  Office: [phone]   Fax:    [phone]         Recommend calcium 1200mg daily and  vitamin D 1000iu daily in the summer and 2000iu in the fall, winter and spring, and daily exercise with some resistance training to help keep your bones strong. Recent research has shown that daily or every other day weight bearing exercise with resistance training is especially important in maintaining and increasing bone density and preventing osteoporosis.       Recommend calcium 1200mg daily and  vitamin D 1000iu daily in the summer and 2000iu in the fall, winter and spring, and daily exercise with some resistance training to help keep your bones strong. Recent research has shown that daily or every other day weight bearing exercise with resistance training is especially important in maintaining and increasing bone density and preventing osteoporosis.       Preventive Health Recommendations  Female Ages 50 - 64    Yearly exam: See your health care provider every year in order to  o Review health changes.   o Discuss preventive care.    o Review your medicines if your doctor has prescribed any.      Get a Pap test every three years (unless you have an abnormal result and your provider advises testing more often).    If you get Pap tests with HPV test, you only need to test every 5 years, unless you have an abnormal result.     You do not need a Pap test if your uterus was removed (hysterectomy) and you have not had cancer.    You should be tested each year for STDs (sexually transmitted diseases) if you're at risk.     Have a mammogram every 1 to 2 years.    Have a colonoscopy at age 50, or have a yearly FIT test (stool test). These exams screen for colon cancer.      Have a cholesterol test every 5 years, or more often if advised.    Have a diabetes test (fasting glucose) every three years. If you are at risk for diabetes, you should have this  test more often.     If you are at risk for osteoporosis (brittle bone disease), think about having a bone density scan (DEXA).    Shots: Get a flu shot each year. Get a tetanus shot every 10 years.    Nutrition:     Eat at least 5 servings of fruits and vegetables each day.    Eat whole-grain bread, whole-wheat pasta and brown rice instead of white grains and rice.    Get adequate Calcium and Vitamin D.     Lifestyle    Exercise at least 150 minutes a week (30 minutes a day, 5 days a week). This will help you control your weight and prevent disease.    Limit alcohol to one drink per day.    No smoking.     Wear sunscreen to prevent skin cancer.     See your dentist every six months for an exam and cleaning.    See your eye doctor every 1 to 2 years.      Thank you so much or choosing [facility]  for your Health Care. It was a pleasure seeing you at your visit today! Please contact us with any questions or concerns you may have.                   Mary Langston MD                              To reach your [facility] care team after hours call:   [phone]    PLEASE NOTE OUR HOURS HAVE CHANGED secondary to COVID-19 coronavirus pandemic, as we are trying to minimize patient exposure to the virus,  which is now widespread in the [facility].  These hours may change with very little notice.  We apologize for any inconvenience.       Our current clinic hours are:          Monday- Thursday   7:00am - 6:00pm  in person.      Friday  7:00am- 5:00pm                       Saturday and Sunday : Closed to in person and virtual visits        We have telephone and virtual visit times available between    7:00am - 6pm on Monday-Friday as well.                                                Phone:  [phone]      Our pharmacy hours: Monday through Friday 8:00am to 5:00pm                        Saturday - 9:00 am to 12 noon       Sunday : Closed.              Phone:   [phone]              ###  Please note: at this time we are not accepting any walk-in visits. ###      There is also information available at our web site:  [email or web address]    If your provider ordered any lab tests and you do not receive the results within 10 business days, please call the clinic.    If you need a medication refill please contact your pharmacy.  Please allow 3 business days for your refill to be completed.    Our clinic offers telephone visits and e visits.  Please ask one of your team members to explain more.      Use [employer] (secure email communication and access to your chart) to send your primary care provider a message or make an appointment. Ask someone on your Team how to sign up for [employer].

## 2021-12-20 NOTE — PROGRESS NOTES
SUBJECTIVE:   CC: Mildred Tellez is an 51 year old woman who presents for preventive health visit and the following other medical problems:      1. Routine general medical examination at a health care facility    2. Hypothyroidism, unspecified type- feels much better lately - needs levels rechecked and med refills     3. Screening for malignant neoplasm of cervix    4. History of dysplastic nevus    5. Overweight (BMI 25.0-29.9)    6. ASCUS pap -  7/2013 - saw Dr. Beltran ob/gyn [place]     7. Visit for screening mammogram    8. Chronic right-sided low back pain with right-sided sciatica    9. Chronic pain of both ankles    10. Hyperlipidemia LDL goal <160    11. Encounter for vitamin deficiency screening    12. Screen for colon cancer    13. Multiple pigmented nevi    14. Vulvar cysts            Patient has been advised of split billing requirements and indicates understanding: Yes  Healthy Habits:     Getting at least 3 servings of Calcium per day:  Yes    Bi-annual eye exam:  Yes    Dental care twice a year:  Yes    Sleep apnea or symptoms of sleep apnea:  None    Diet:  Regular (no restrictions)    Frequency of exercise:  4-5 days/week    Duration of exercise:  Greater than 60 minutes    Taking medications regularly:  Yes    Medication side effects:  None    PHQ-2 Total Score: 0    Additional concerns today:  Yes    x1 year  - off and on Right low back and posterior  Hip/buttock area - pain. weakens leg at it's worst - going up and down stairs.  Happens on and off. Doesn't happen when she's running.      Also for the last 6 months, on and off , she is  getting Some ankle pain as well when she is running.  - bilateral - does 10 minutes of stretching.  Stops her from running more than 30 minutes at a time otherwise she can't run the rest of the week.   No pain yesterday with exercise yesterday.       Hypothyroidism Follow-up: needs follow up on her levels and medication refills.       Since last visit, patient  describes the following symptoms: dry skin  TSH   Date Value Ref Range Status   06/23/2021 0.74 0.40 - 4.00 mU/L Final   04/21/2021 7.01 (H) 0.40 - 4.00 mU/L Final   12/16/2020 3.27 0.40 - 4.00 mU/L Final   12/26/2019 2.81 0.40 - 4.00 mU/L Final   09/27/2019 0.31 (L) 0.40 - 4.00 mU/L Final     T4 Free   Date Value Ref Range Status   06/23/2021 1.36 0.76 - 1.46 ng/dL Final   04/21/2021 1.24 0.76 - 1.46 ng/dL Final   12/16/2020 1.53 (H) 0.76 - 1.46 ng/dL Final   12/26/2019 1.25 0.76 - 1.46 ng/dL Final   09/27/2019 1.39 0.76 - 1.46 ng/dL Final         Hyperlipidemia: seemed to be better when thyroid better controlled.   Is fasting today.  Has improved her diet as well.   Recent Labs   Lab Test 06/23/21  0836 04/21/21  0836 09/10/16  1002 07/06/15  1046 10/10/14  0955   CHOL 200* 208*   < > 183 172   HDL 39* 43*   < > 45* 41*   * 145*   < > 119 115   TRIG 109 101   < > 95 82   CHOLHDLRATIO  --   --   --  4.1 4.2    < > = values in this interval not displayed.        Today's PHQ-2 Score:   PHQ-2 ( 1999 Pfizer) 12/20/2021   Q1: Little interest or pleasure in doing things 0   Q2: Feeling down, depressed or hopeless 0   PHQ-2 Score 0   PHQ-2 Total Score (12-17 Years)- Positive if 3 or more points; Administer PHQ-A if positive -   Q1: Little interest or pleasure in doing things Not at all   Q2: Feeling down, depressed or hopeless Not at all   PHQ-2 Score 0       Immunization History   Administered Date(s) Administered     COVID-19,PF,Pfizer (12+ Yrs) 04/21/2021, 05/12/2021, 11/15/2021     Influenza (IIV3) PF 10/04/2013, 10/10/2014, 09/10/2016     Influenza Quad, Recombinant, pf(RIV4) (Flublok) 09/16/2021     Influenza Vaccine IM > 6 months Valent IIV4 (Alfuria,Fluzone) 11/04/2017, 09/17/2018, 09/20/2019, 09/30/2020     TDAP Vaccine (Boostrix) 07/31/2013     Zoster vaccine recombinant adjuvanted (SHINGRIX) 07/23/2021, 09/20/2021       Abuse: Current or Past (Physical, Sexual or Emotional) - No  Do you feel safe in your  environment? Yes    Have you ever done Advance Care Planning? (For example, a Health Directive, POLST, or a discussion with a medical provider or your loved ones about your wishes): No, advance care planning information given to patient to review.  Patient declined advance care planning discussion at this time.    Social History     Tobacco Use     Smoking status: Never Smoker     Smokeless tobacco: Never Used   Substance Use Topics     Alcohol use: Never     Alcohol/week: 0.0 standard drinks     If you drink alcohol do you typically have >3 drinks per day or >7 drinks per week? No    Alcohol Use 12/20/2021   Prescreen: >3 drinks/day or >7 drinks/week? No   Prescreen: >3 drinks/day or >7 drinks/week? -       Reviewed orders with patient.  Reviewed health maintenance and updated orders accordingly - Yes  Lab work is in process  Labs reviewed in EPIC  BP Readings from Last 3 Encounters:   12/20/21 100/62   12/16/20 102/60   01/23/20 100/58    Wt Readings from Last 3 Encounters:   12/20/21 66.7 kg (147 lb)   12/16/20 65.3 kg (144 lb)   01/23/20 68.5 kg (151 lb)                  Patient Active Problem List   Diagnosis     Hypothyroidism, unspecified type     Rosacea     Hyperlipidemia LDL goal <160     RLQ abdominal pain - when getting up from sitting in area of appy     Abnormal Pap smear, can't excl hi gd sq intraepithelial lesion (ASC-H)     Compound nevus of neck - with dysplastic features - right neck 11/2013     ASCUS pap -  7/2013 - saw Dr. Beltran ob/gyn [place]      History of dysplastic nevus     Cervicalgia     Gas bloat syndrome     Overweight (BMI 25.0-29.9)     Food sensitivity with gastrointestinal symptoms- plants in the nightshade family - tomatoes, potatoes, eggplant, tomatillos , peppers      Multiple pigmented nevi     Painful lumpy left breast- resolved      Past Surgical History:   Procedure Laterality Date     C APPENDECTOMY  1984       Social History     Tobacco Use     Smoking status: Never Smoker      Smokeless tobacco: Never Used   Substance Use Topics     Alcohol use: Never     Alcohol/week: 0.0 standard drinks     Family History   Problem Relation Age of Onset     Hypertension Mother      Glaucoma Mother      Hypertension Maternal Grandmother      Diabetes Paternal Grandmother          of complications fr. diabetes     C.A.D. Father         on blood thinners for his PAD     Cardiovascular Father         peripheral vascular disease- very heavy smoker      Lung Cancer Father      Breast Cancer Other         paternal great aunt         Current Outpatient Medications   Medication Sig Dispense Refill     levothyroxine (SYNTHROID/LEVOTHROID) 137 MCG tablet TAKE 1 TABLET (137 MCG) BY MOUTH DAILY 90 tablet 0     No Known Allergies  Recent Labs   Lab Test 21  0836 21  0836 20  0916 19  1442 19  0922   * 145* 155*  --  113*   HDL 39* 43* 31*  --  34*   TRIG 109 101 126  --  116   ALT 25  --  43  --  23   CR  --   --  0.84  --  0.69   GFRESTIMATED  --   --  81  --  >90   GFRESTBLACK  --   --  >90  --  >90   POTASSIUM  --   --  4.7  --  4.1   TSH 0.74 7.01* 3.27   < > 0.31*    < > = values in this interval not displayed.        Breast Cancer Screening:  Any new diagnosis of family breast, ovarian, or bowel cancer? No    FHS-7: No flowsheet data found.    Mammogram Screening: Recommended annual mammography  Pertinent mammograms are reviewed under the imaging tab.    History of abnormal Pap smear: NO - age 30- 65 PAP every 3 years recommended  PAP / HPV Latest Ref Rng & Units 2018 9/10/2016 2015   PAP (Historical) - NIL NIL ASC-US(A)   HPV16 NEG:Negative Negative Negative Negative   HPV18 NEG:Negative Negative Negative Negative   HRHPV NEG:Negative Negative Negative Negative     Reviewed and updated as needed this visit by clinical staff  Tobacco  Allergies  Meds  Problems  Med Hx  Surg Hx  Fam Hx  Soc Hx         Reviewed and updated as needed this visit by  Provider  Tobacco  Allergies  Meds  Problems  Med Hx  Surg Hx  Fam Hx        Past Medical History:   Diagnosis Date     Abnormal Pap smear, can't excl hi gd sq intraepithelial lesion (ASC-H) 2013    colp neg     Bleeding gastric ulcer      from taking aspirin at age 27 - couldn't tell whether resolved      Coxsackie virus infection - severe - summer of  - hands and feet totally peeled and oral lesions very painful 9/10/2016     Hypothyroidism     after first pregnancy      Mastodynia- left breast- resolved  10/4/2013     Papanicolaou smear of cervix with atypical squamous cells of undetermined significance (ASC-US) 2015     neg HPV, needs colp     Post op infection     s/p her appy as a child      Postpartum septic endometritis     fr.  prolonged rupture     RLQ abdominal pain     when getting up from sitting in area of appy      Past Surgical History:   Procedure Laterality Date     C APPENDECTOMY       OB History    Para Term  AB Living   2 2 2 0 0 2   SAB IAB Ectopic Multiple Live Births   0 0 0 0 0      # Outcome Date GA Lbr Rudi/2nd Weight Sex Delivery Anes PTL Lv   2 Term               Birth Comments:    1 Term               Birth Comments: prolonged ROM- postpartum endometritis -       Obstetric Comments   First pregnancy - Moiz - developed chorioamnionitis & postpartum endometritis despite IV abx from prolonged rupture = 30 hrs.  Also had partially retained placenta that she passed on her own 3 days s/p delivery.       No problems with second = Sherie.       Review of Systems   Constitutional: Negative for chills and fever.   HENT: Negative for congestion, ear pain, hearing loss and sore throat.    Eyes: Negative for pain and visual disturbance.   Respiratory: Negative for cough and shortness of breath.    Cardiovascular: Negative for chest pain, palpitations and peripheral edema.   Gastrointestinal: Negative for abdominal pain, constipation, diarrhea,  "heartburn, hematochezia and nausea.   Breasts:  Negative for tenderness, breast mass and discharge.   Genitourinary: Positive for vaginal discharge. Negative for dysuria, frequency, genital sores, hematuria, pelvic pain, urgency and vaginal bleeding.   Musculoskeletal: Negative for arthralgias, joint swelling and myalgias.   Skin: Negative for rash.   Neurological: Negative for dizziness, weakness, headaches and paresthesias.   Psychiatric/Behavioral: Negative for mood changes. The patient is not nervous/anxious.      CONSTITUTIONAL: NEGATIVE for fever, chills, change in weight  INTEGUMENTARU/SKIN: NEGATIVE for worrisome rashes, moles or lesions  EYES: NEGATIVE for vision changes or irritation  ENT: NEGATIVE for ear, mouth and throat problems  RESP: NEGATIVE for significant cough or SOB  BREAST: NEGATIVE for masses, tenderness or discharge  CV: NEGATIVE for chest pain, palpitations or peripheral edema  GI: NEGATIVE for nausea, abdominal pain, heartburn, or change in bowel habits  : NEGATIVE for unusual urinary or vaginal symptoms. Periods are regular.  MUSCULOSKELETAL: NEGATIVE for significant arthralgias or myalgia  NEURO: NEGATIVE for weakness, dizziness or paresthesias  ENDOCRINE: NEGATIVE for temperature intolerance, skin/hair changes  HEME/ALLERGY/IMMUNE: NEGATIVE for bleeding problems  PSYCHIATRIC: NEGATIVE for changes in mood or affect     OBJECTIVE:   /62 (BP Location: Left arm, Patient Position: Chair, Cuff Size: Adult Regular)   Pulse 88   Temp (!) 96.7  F (35.9  C) (Tympanic)   Ht 1.6 m (5' 3\")   Wt 66.7 kg (147 lb)   LMP 12/09/2021 (Exact Date)   SpO2 99%   Breastfeeding No   BMI 26.04 kg/m    Physical Exam  GENERAL: healthy, alert and no distress  EYES: Eyes grossly normal to inspection, PERRL and conjunctivae and sclerae normal  HENT: ear canals and TM's normal, nose and mouth without ulcers or lesions  NECK: no adenopathy, no asymmetry, masses, or scars and thyroid normal to " palpation  RESP: lungs clear to auscultation - no rales, rhonchi or wheezes  BREAST: normal without masses, tenderness or nipple discharge and no palpable axillary masses or adenopathy  CV: regular rate and rhythm, normal S1 S2, no S3 or S4, no murmur, click or rub, no peripheral edema and peripheral pulses strong  ABDOMEN: soft, nontender, no hepatosplenomegaly, no masses and bowel sounds normal   (female): normal female external genitalia, normal urethral meatus, vaginal mucosa pink, moist, well rugated, and normal cervix/adnexa/uterus without masses or discharge; bilateral vulvar/bartholin's gland cysts - not infected - right (1 is ovoid and measures approximately 2.5 x 1.5 cm)> left .   MS: no gross musculoskeletal defects noted, no edema  SKIN: no suspicious lesions or rashes, but many pigmented nevi.   NEURO: Normal strength and tone, mentation intact and speech normal  PSYCH: mentation appears normal, affect normal/bright  LYMPH: no cervical, supraclavicular, axillary, or inguinal adenopathy    Diagnostic Test Results:  Labs reviewed in Epic    ASSESSMENT/PLAN:       ICD-10-CM    1. Routine general medical examination at a health care facility  Z00.00    2. Hypothyroidism, unspecified type- feels much better lately - needs levels rechecked and med refills   E03.9 REVIEW OF HEALTH MAINTENANCE PROTOCOL ORDERS     T3 total     T4 free     TSH     T3 total     T4 free     TSH     OFFICE/OUTPT VISIT,EST,LEVL IV   3. Screening for malignant neoplasm of cervix  Z12.4 Pap Screen with HPV - recommended age 30 - 65 years   4. History of dysplastic nevus  Z86.018 OFFICE/OUTPT VISIT,EST,LEVL IV   5. Overweight (BMI 25.0-29.9)  E66.3    6. ASCUS pap -  7/2013 - saw Dr. Beltran ob/gyn [place]   R87.528    7. Visit for screening mammogram  Z12.31 MA Screen Bilateral w/Justin   8. Chronic right-sided low back pain with right-sided sciatica  M54.41 Spine Referral    G89.29 OFFICE/OUTPT VISIT,EST,LEVL IV   9. Chronic pain of both  "ankles  M25.571 Orthopedic  Referral    G89.29 OFFICE/OUTPT VISIT,EST,LEVL IV    M25.572    10. Hyperlipidemia LDL goal <160  E78.5 CBC with platelets     Comprehensive metabolic panel     Lipid panel reflex to direct LDL Fasting     CBC with platelets     Comprehensive metabolic panel     Lipid panel reflex to direct LDL Fasting     OFFICE/OUTPT VISIT,EST,LEVL IV   11. Encounter for vitamin deficiency screening  Z13.21 Vitamin D Deficiency     Vitamin D Deficiency   12. Screen for colon cancer  Z12.11 Adult Gastro Ref - Procedure Only     Fecal colorectal cancer screen (FIT)     Fecal colorectal cancer screen (FIT)   13. Multiple pigmented nevi  D22.9 Adult Dermatology Referral     OFFICE/OUTPT VISIT,EST,LEVL IV   14. Vulvar cysts  N90.7 Ob/Gyn Referral     OFFICE/OUTPT VISIT,EST,LEVL IV     Patient will see Leonora Graham PA-C or dermatologist for follow-up on annual skin exam for her many pigmented nevi and follow-up on her history of dysplastic nevus.  Patient has been advised of split billing requirements and indicates understanding: Yes  COUNSELING:  Reviewed preventive health counseling, as reflected in patient instructions    Estimated body mass index is 26.04 kg/m  as calculated from the following:    Height as of this encounter: 1.6 m (5' 3\").    Weight as of this encounter: 66.7 kg (147 lb).    Weight management plan: Discussed healthy diet and exercise guidelines    She reports that she has never smoked. She has never used smokeless tobacco.      Counseling Resources:  ATP IV Guidelines  Pooled Cohorts Equation Calculator  Breast Cancer Risk Calculator  BRCA-Related Cancer Risk Assessment: FHS-7 Tool  FRAX Risk Assessment  ICSI Preventive Guidelines  Dietary Guidelines for Americans, 2010  USDA's MyPlate  ASA Prophylaxis  Lung CA Screening           Mary Langston MD  [facility]"

## 2021-12-21 LAB
CHOLEST SERPL-MCNC: 171 MG/DL
FASTING STATUS PATIENT QL REPORTED: YES
HDLC SERPL-MCNC: 45 MG/DL
LDLC SERPL CALC-MCNC: 111 MG/DL
NONHDLC SERPL-MCNC: 126 MG/DL
TRIGL SERPL-MCNC: 74 MG/DL

## 2021-12-22 LAB
BKR LAB AP GYN ADEQUACY: NORMAL
BKR LAB AP GYN INTERPRETATION: NORMAL
BKR LAB AP HPV REFLEX: NORMAL
BKR LAB AP LMP: NORMAL
BKR LAB AP PREVIOUS ABNORMAL: NORMAL
PATH REPORT.COMMENTS IMP SPEC: NORMAL
PATH REPORT.COMMENTS IMP SPEC: NORMAL
PATH REPORT.RELEVANT HX SPEC: NORMAL

## 2021-12-23 LAB
HUMAN PAPILLOMA VIRUS 16 DNA: NEGATIVE
HUMAN PAPILLOMA VIRUS 18 DNA: NEGATIVE
HUMAN PAPILLOMA VIRUS FINAL DIAGNOSIS: NORMAL
HUMAN PAPILLOMA VIRUS OTHER HR: NEGATIVE

## 2022-01-06 ENCOUNTER — OFFICE VISIT (OUTPATIENT)
Dept: NEUROSURGERY | Facility: CLINIC | Age: 52
End: 2022-01-06
Attending: FAMILY MEDICINE
Payer: COMMERCIAL

## 2022-01-06 VITALS
WEIGHT: 147 LBS | HEART RATE: 83 BPM | HEIGHT: 63 IN | OXYGEN SATURATION: 99 % | DIASTOLIC BLOOD PRESSURE: 70 MMHG | BODY MASS INDEX: 26.05 KG/M2 | SYSTOLIC BLOOD PRESSURE: 121 MMHG

## 2022-01-06 DIAGNOSIS — M54.41 CHRONIC RIGHT-SIDED LOW BACK PAIN WITH RIGHT-SIDED SCIATICA: ICD-10-CM

## 2022-01-06 DIAGNOSIS — G89.29 CHRONIC RIGHT-SIDED LOW BACK PAIN WITH RIGHT-SIDED SCIATICA: ICD-10-CM

## 2022-01-06 PROCEDURE — G0463 HOSPITAL OUTPT CLINIC VISIT: HCPCS

## 2022-01-06 PROCEDURE — 99204 OFFICE O/P NEW MOD 45 MIN: CPT | Performed by: PHYSICIAN ASSISTANT

## 2022-01-06 ASSESSMENT — MIFFLIN-ST. JEOR: SCORE: 1250.92

## 2022-01-06 ASSESSMENT — PAIN SCALES - GENERAL: PAINLEVEL: MILD PAIN (2)

## 2022-01-06 NOTE — LETTER
"    1/6/2022         RE: Mildred Tellez  [address] [facility] [address]        Dear Colleague,    Thank you for referring your patient, Mildred Tellez, to the [facility] [place]. Please see a copy of my visit note below.    NEUROSURGERY CLINIC CONSULT NOTE     DATE OF VISIT: 1/6/2022     SUBJECTIVE:     Mildred Tellez is a pleasant 51 year old female who presents to the clinic today for consultation on low back pain. She is referred to the Neurosurgery Clinic by Dr. Langston in Primary Care. Pertinent medical history consists of starting to run several months ago when she noted pain; since then has been progressively worsening.     Today, she reports a several-month history of symptoms. She describes intermittent, aching and burning pain that initiates in the low lumbar region and radiates distally into right buttock occasionally feels \"achy\" and radiates into anterior thigh 3 times recently. This pain is not accompanied by paresthesia, numbness. Admits her right lower extremity felt like it was going to give out when she felt radiation into her anterior thigh recently. Prolonged walking, stairs, getting up from seated position aggravate the symptoms, while alleviation is obtained by resting. No mechanism of injury such as trauma or a fall is associated with the onset of the pain although pain started around the same time as when she started running. There are no bowel or bladder changes. She denies saddle anesthesia. She denies changes in gait, instability, or falling episodes.     She has not participated in conservative therapies.       Current Outpatient Medications:      levothyroxine (SYNTHROID/LEVOTHROID) 137 MCG tablet, TAKE 1 TABLET (137 MCG) BY MOUTH DAILY, Disp: 90 tablet, Rfl: 0     No Known Allergies     Past Medical History:   Diagnosis Date     Abnormal Pap smear, can't excl hi gd sq intraepithelial lesion (ASC-H) 7/2013    colp neg     " "Bleeding gastric ulcer 1997     from taking aspirin at age 27 - couldn't tell whether resolved      Coxsackie virus infection - severe - summer of 2016 - hands and feet totally peeled and oral lesions very painful 9/10/2016     Hypothyroidism     after first pregnancy      Mastodynia- left breast- resolved  10/4/2013     Papanicolaou smear of cervix with atypical squamous cells of undetermined significance (ASC-US) 7/2015     neg HPV, needs colp     Post op infection     s/p her appy as a child      Postpartum septic endometritis     fr.  prolonged rupture     RLQ abdominal pain     when getting up from sitting in area of appy        ROS: 10 point review of symptoms are negative other than the symptoms noted above in the HPI.     Family History has been reviewed with the patient, there are no pertinent findings to presenting concern.     Past Surgical History:   Procedure Laterality Date     ZZC APPENDECTOMY  1984        Social History     Tobacco Use     Smoking status: Never Smoker     Smokeless tobacco: Never Used   Substance Use Topics     Alcohol use: Never     Alcohol/week: 0.0 standard drinks     Drug use: No     Comment: no herbal meds either         OBJECTIVE:   /70   Pulse 83   Ht 5' 3\" (1.6 m)   Wt 147 lb (66.7 kg)   LMP 12/09/2021 (Exact Date)   SpO2 99%   BMI 26.04 kg/m     Body mass index is 26.04 kg/m .     Exam:   CN II-XII grossly intact, alert and appropriate with conversation and following commands.   Gait is non-antalgic. Able to walk on toes and heels without difficulty.   Cervical spine is non tender to palpation.   Bilateral bicep 2+/4 and tricep reflexes 2+/4. Sensation intact throughout upper extremities.     UE muscle strength  Right  Left    Deltoid  5/5  5/5    Biceps  5/5  5/5    Triceps  5/5  5/5    Hand intrinsics  5/5  5/5    Hand grasp  5/5  5/5           Lumbar spine is tender to palpation L4-5 spinous processes and right paraspinous muscles   Decreased sensation to " light touch right lateral aspect of hip. Remainder sensation intact to light touch   Bilateral patellar 2/4 and achilles reflex 2/4. Positive for pain with right straight leg raise.     LE muscle strength  Right  Left    Iliopsoas (hip flexion)  5/5  5/5    Quad (knee extension)  5/5  5/5    Hamstring (knee flexion)  5/5  5/5    Gastrocnemius (PF)  5/5  5/5    Tibialis Ant. (DF)  5/5  5/5    EHL  5/5  5/5      Negative for clonus.   Calves are soft and non-tender bilaterally.     ASSESSMENT/PLAN:       Mildred Tellez is a pleasant 51 year old female who presents to the clinic today for consultation on low back pain. She is referred to the Neurosurgery Clinic by Dr. Langston in Primary Care. Pertinent medical history consists of starting to run several months ago when she noted pain; since then has been progressively worsening. On exam, she is noted to have appropriate strength, sensation and range of motion. She has not attempted conservative management.    Based on her physical exam, imaging review, and past treatments, we feel that it would be in her best interest to try a conservative approach by participating in a physical therapy program.    Should symptoms worsen or persist despite physical therapy, recommend lumbar MRI w/o contrast.     Mildred in agreement with plans. In the event that patient's symptoms worsen or change we would like to see her sooner. We also discussed signs of a worsening problem that she should seek being evaluated.        Respectfully,     Mackenzie Paul PA-C  [facility] Neurosurgery  [address] [address]    Tel [phone]          Again, thank you for allowing me to participate in the care of your patient.        Sincerely,        Mackenzie Paul PA-C

## 2022-01-06 NOTE — PROGRESS NOTES
"Mildred Tellez is a 51 year old female who presents for:  Chief Complaint   Patient presents with     Back Pain     Chronic R sided LBP w/ r sided sciatica         Initial Vitals:  /70   Pulse 83   Ht 5' 3\" (1.6 m)   Wt 147 lb (66.7 kg)   LMP 12/09/2021 (Exact Date)   SpO2 99%   BMI 26.04 kg/m   Estimated body mass index is 26.04 kg/m  as calculated from the following:    Height as of this encounter: 5' 3\" (1.6 m).    Weight as of this encounter: 147 lb (66.7 kg).. Body surface area is 1.72 meters squared. BP completed using cuff size: X-large  Mild Pain (2)    Nursing Comments:     Liliam Mina MA    "

## 2022-01-06 NOTE — PATIENT INSTRUCTIONS
-PT referral placed, they will call you to schedule   -Should symptoms persist or worsen despite physical therapy, recommend lumbar MRI. You will need to call to schedule.     Call [facility] scheduling for your procedure:  For scheduling in the [place] ([place], [facility], and [place]) call [phone] or [phone]      For scheduling at [facility] ([facility], [facility]), call [phone] or [phone]      For scheduling in the South ([facility]) call [phone]  or  [phone]      -Contact our office after Mri to review results and next steps    Mackenzie QUEZADA [facility] Neurosurgery  [address] [address]    Tel [phone]

## 2022-01-06 NOTE — PROGRESS NOTES
"NEUROSURGERY CLINIC CONSULT NOTE     DATE OF VISIT: 1/6/2022     SUBJECTIVE:     Mildred Tellez is a pleasant 51 year old female who presents to the clinic today for consultation on low back pain. She is referred to the Neurosurgery Clinic by Dr. Langston in Primary Care. Pertinent medical history consists of starting to run several months ago when she noted pain; since then has been progressively worsening.     Today, she reports a several-month history of symptoms. She describes intermittent, aching and burning pain that initiates in the low lumbar region and radiates distally into right buttock occasionally feels \"achy\" and radiates into anterior thigh 3 times recently. This pain is not accompanied by paresthesia, numbness. Admits her right lower extremity felt like it was going to give out when she felt radiation into her anterior thigh recently. Prolonged walking, stairs, getting up from seated position aggravate the symptoms, while alleviation is obtained by resting. No mechanism of injury such as trauma or a fall is associated with the onset of the pain although pain started around the same time as when she started running. There are no bowel or bladder changes. She denies saddle anesthesia. She denies changes in gait, instability, or falling episodes.     She has not participated in conservative therapies.       Current Outpatient Medications:      levothyroxine (SYNTHROID/LEVOTHROID) 137 MCG tablet, TAKE 1 TABLET (137 MCG) BY MOUTH DAILY, Disp: 90 tablet, Rfl: 0     No Known Allergies     Past Medical History:   Diagnosis Date     Abnormal Pap smear, can't excl hi gd sq intraepithelial lesion (ASC-H) 7/2013    colp neg     Bleeding gastric ulcer 1997     from taking aspirin at age 27 - couldn't tell whether resolved      Coxsackie virus infection - severe - summer of 2016 - hands and feet totally peeled and oral lesions very painful 9/10/2016     Hypothyroidism     after first pregnancy      Mastodynia- " "left breast- resolved  10/4/2013     Papanicolaou smear of cervix with atypical squamous cells of undetermined significance (ASC-US) 7/2015     neg HPV, needs colp     Post op infection     s/p her appy as a child      Postpartum septic endometritis     fr.  prolonged rupture     RLQ abdominal pain     when getting up from sitting in area of appy        ROS: 10 point review of symptoms are negative other than the symptoms noted above in the HPI.     Family History has been reviewed with the patient, there are no pertinent findings to presenting concern.     Past Surgical History:   Procedure Laterality Date     ZZC APPENDECTOMY  1984        Social History     Tobacco Use     Smoking status: Never Smoker     Smokeless tobacco: Never Used   Substance Use Topics     Alcohol use: Never     Alcohol/week: 0.0 standard drinks     Drug use: No     Comment: no herbal meds either         OBJECTIVE:   /70   Pulse 83   Ht 5' 3\" (1.6 m)   Wt 147 lb (66.7 kg)   LMP 12/09/2021 (Exact Date)   SpO2 99%   BMI 26.04 kg/m     Body mass index is 26.04 kg/m .     Exam:   CN II-XII grossly intact, alert and appropriate with conversation and following commands.   Gait is non-antalgic. Able to walk on toes and heels without difficulty.   Cervical spine is non tender to palpation.   Bilateral bicep 2+/4 and tricep reflexes 2+/4. Sensation intact throughout upper extremities.     UE muscle strength  Right  Left    Deltoid  5/5  5/5    Biceps  5/5  5/5    Triceps  5/5  5/5    Hand intrinsics  5/5  5/5    Hand grasp  5/5  5/5           Lumbar spine is tender to palpation L4-5 spinous processes and right paraspinous muscles   Decreased sensation to light touch right lateral aspect of hip. Remainder sensation intact to light touch   Bilateral patellar 2/4 and achilles reflex 2/4. Positive for pain with right straight leg raise.     LE muscle strength  Right  Left    Iliopsoas (hip flexion)  5/5  5/5    Quad (knee extension)  5/5  5/5  "   Hamstring (knee flexion)  5/5  5/5    Gastrocnemius (PF)  5/5  5/5    Tibialis Ant. (DF)  5/5  5/5    EHL  5/5  5/5      Negative for clonus.   Calves are soft and non-tender bilaterally.     ASSESSMENT/PLAN:       Mildred Tellez is a pleasant 51 year old female who presents to the clinic today for consultation on low back pain. She is referred to the Neurosurgery Clinic by Dr. Langston in Primary Care. Pertinent medical history consists of starting to run several months ago when she noted pain; since then has been progressively worsening. On exam, she is noted to have appropriate strength, sensation and range of motion. She has not attempted conservative management.    Based on her physical exam, imaging review, and past treatments, we feel that it would be in her best interest to try a conservative approach by participating in a physical therapy program.    Should symptoms worsen or persist despite physical therapy, recommend lumbar MRI w/o contrast.     Mildred in agreement with plans. In the event that patient's symptoms worsen or change we would like to see her sooner. We also discussed signs of a worsening problem that she should seek being evaluated.        Respectfully,     Mackenzie QUEZADA [facility]  [address] [address]    Tel [phone]

## 2022-02-07 DIAGNOSIS — Z11.59 ENCOUNTER FOR SCREENING FOR OTHER VIRAL DISEASES: Primary | ICD-10-CM

## 2022-02-19 ENCOUNTER — OFFICE VISIT (OUTPATIENT)
Dept: URGENT CARE | Facility: URGENT CARE | Age: 52
End: 2022-02-19
Payer: COMMERCIAL

## 2022-02-19 VITALS
OXYGEN SATURATION: 100 % | BODY MASS INDEX: 25.69 KG/M2 | HEART RATE: 90 BPM | DIASTOLIC BLOOD PRESSURE: 60 MMHG | SYSTOLIC BLOOD PRESSURE: 120 MMHG | WEIGHT: 145 LBS | TEMPERATURE: 97.5 F

## 2022-02-19 DIAGNOSIS — B37.31 YEAST INFECTION OF THE VAGINA: ICD-10-CM

## 2022-02-19 DIAGNOSIS — B35.6 TINEA CRURIS: ICD-10-CM

## 2022-02-19 DIAGNOSIS — T63.481A INSECT STING, ACCIDENTAL OR UNINTENTIONAL, INITIAL ENCOUNTER: Primary | ICD-10-CM

## 2022-02-19 DIAGNOSIS — L73.9 FOLLICULITIS: ICD-10-CM

## 2022-02-19 PROCEDURE — 99213 OFFICE O/P EST LOW 20 MIN: CPT | Performed by: FAMILY MEDICINE

## 2022-02-19 RX ORDER — KETOCONAZOLE 20 MG/G
CREAM TOPICAL 2 TIMES DAILY
Qty: 120 G | Refills: 1 | Status: SHIPPED | OUTPATIENT
Start: 2022-02-19 | End: 2022-02-22

## 2022-02-19 RX ORDER — FLUCONAZOLE 150 MG/1
150 TABLET ORAL WEEKLY
Qty: 2 TABLET | Refills: 0 | Status: SHIPPED | OUTPATIENT
Start: 2022-02-19 | End: 2022-02-27

## 2022-02-19 RX ORDER — BETAMETHASONE DIPROPIONATE 0.5 MG/G
CREAM TOPICAL 2 TIMES DAILY
Qty: 50 G | Refills: 0 | Status: SHIPPED | OUTPATIENT
Start: 2022-02-19 | End: 2022-03-29

## 2022-02-19 NOTE — PROGRESS NOTES
ASSESSMENT/  PLAN:  Insect sting, accidental or unintentional, initial encounter     - augmented betamethasone dipropionate (DIPROLENE-AF) 0.05 % external cream; Apply topically 2 times daily    Right posterior lower leg,  From inflammatory reaction from insect venom- present for 1 month-  Steroid to calm inflammatory reaction    Folliculitis     - amoxicillin-clavulanate (AUGMENTIN) 875-125 MG tablet; Take 1 tablet by mouth 2 times daily    In groin and axilla, and sparse areas of arms.   Suspect that this spread from a hair follicle infection  Of the pubic area that spread from shaving to the bilateral groin and axilla    Keep area clean and dry      Tinea cruris     - ketoconazole (NIZORAL) 2 % external cream; Apply topically 2 times daily for 21 days    Rash in groin and axilla with scaling, and itching- likely due to moisture in the groin creases. And spread to axilla and groin with shaving    Yeast infection of the vagina     - fluconazole (DIFLUCAN) 150 MG tablet; Take 1 tablet (150 mg) by mouth once a week for 2 doses One tablet per week   for vaginal itching     .          ---------------------------------------------------------------------------------------------------------------------------------------------------------------    SUBJECTIVE:  Chief Complaint   Patient presents with     Urgent Care     Derm Problem       Mildred Tellez is a 51 year old female who presents to the clinic today for a groin/ axilla rash.  And swollen itchy spot on the right lower leg  Onset of groin/ axilla rash was 2 week(s) ago.   Rash is gradual onset, still present, worsening and constant.   Quality/symptoms of rash: itching, burning, dry, red and scaly -   Started in the groin and spread to bilateral axilla  Has developed some painful red bumps in the area of the rash in groin and axilla and has a few red bumps on the forearms  Symptoms are moderate and rash seems to be worsening.  Previous history of a similar rash?  No  Recent exposure history: none known    Her right lower leg has had an itchy raised quarter size spot that gradually enlarged over the past month-  Started when she traveled to [place]  -   Round, itchy, inflamed  She was not aware of insect sting  Associated symptoms include: nothing, she denies fever, throat tightness, wheezing, cough, sore throat and URI symptoms.       Past Medical History:   Diagnosis Date     Abnormal Pap smear, can't excl hi gd sq intraepithelial lesion (ASC-H) 7/2013    colp neg     Bleeding gastric ulcer 1997     from taking aspirin at age 27 - couldn't tell whether resolved      Coxsackie virus infection - severe - summer of 2016 - hands and feet totally peeled and oral lesions very painful 9/10/2016     Hypothyroidism     after first pregnancy      Mastodynia- left breast- resolved  10/4/2013     Papanicolaou smear of cervix with atypical squamous cells of undetermined significance (ASC-US) 7/2015     neg HPV, needs colp     Post op infection     s/p her appy as a child      Postpartum septic endometritis     fr.  prolonged rupture     RLQ abdominal pain     when getting up from sitting in area of appy     Patient Active Problem List   Diagnosis     Hypothyroidism, unspecified type     Rosacea     Hyperlipidemia LDL goal <160     RLQ abdominal pain - when getting up from sitting in area of appy     Abnormal Pap smear, can't excl hi gd sq intraepithelial lesion (ASC-H)     Compound nevus of neck - with dysplastic features - right neck 11/2013     ASCUS pap -  7/2013 - saw Dr. Beltran ob/[facility] [place]      History of dysplastic nevus     Cervicalgia     Gas bloat syndrome     Overweight (BMI 25.0-29.9)     Food sensitivity with gastrointestinal symptoms- plants in the nightshade family - tomatoes, potatoes, eggplant, tomatillos , peppers      Multiple pigmented nevi     Painful lumpy left breast- resolved        ALLERGIES:  Patient has no known allergies.    MEDs  levothyroxine  (SYNTHROID/LEVOTHROID) 137 MCG tablet, TAKE 1 TABLET BY MOUTH DAILY    No current facility-administered medications on file prior to visit.      Social History     Tobacco Use     Smoking status: Never Smoker     Smokeless tobacco: Never Used   Substance Use Topics     Alcohol use: Never     Alcohol/week: 0.0 standard drinks       Family History   Problem Relation Age of Onset     Hypertension Mother      Glaucoma Mother      Hypertension Maternal Grandmother      Diabetes Paternal Grandmother          of complications fr. diabetes     C.A.D. Father         on blood thinners for his PAD     Cardiovascular Father         peripheral vascular disease- very heavy smoker      Lung Cancer Father      Breast Cancer Other         paternal great aunt         ROS:  CONSTITUTIONAL:NEGATIVE for fever, chills, change in weight  EYES: NEGATIVE for vision changes or irritation  ENT/MOUTH: NEGATIVE for ear, mouth and throat problems  RESP:NEGATIVE for significant cough or SOB  GI: NEGATIVE for nausea, abdominal pain, heartburn, or change in bowel habits    EXAM:   /60 (BP Location: Right arm)   Pulse 90   Temp 97.5  F (36.4  C) (Tympanic)   Wt 65.8 kg (145 lb)   SpO2 100%   BMI 25.69 kg/m    GENERAL: alert, no acute distress.  SKIN: Rash description:    Distribution: localized  Location: axilla and groin    Color: red,  Lesion type: patch, scaly,  Scalloped margins  With red raised pustules  In some spots of the rash,  warmth, tenderness, swelling, inflammation and excoriation  Few rare pustules on forearms    Has quarter size raised area of inflammation, swelling, no purulence right posterior leg       EYES: EOMI,   conjunctiva clear  HENT: External ears with no swelling or lesions   Nose and lips without  Swelling, ulcers, erythema or lesions  NECK: normal pain free ROM  RESP: no labored respirations, no tachypnea  EXTREMITIES:   Full ROM without expression of pain or limitation x 4 extremities  NEURO: Normal  strength and tone, ambulation without difficulty,   normal speech and mentation

## 2022-02-19 NOTE — PATIENT INSTRUCTIONS
Patient Education     Folliculitis  Folliculitis is an infection of a hair follicle. A hair follicle is the little pocket where a hair grows out of the skin. Bacteria normally live on the skin. But sometimes bacteria can get trapped in a follicle and cause infection. This causes a bumpy rash. The area over the follicles is red and raised. It may itch or be painful. The bumps may have fluid (pus) inside. The pus may leak and then form crusts. Sores can spread to other areas of the body. Once it goes away, folliculitis can come back at any time. Severe cases may cause lasting (permanent) hair loss and scarring.   Folliculitis can happen anywhere on the body where hair grows. It can be caused by rubbing from tight clothing. Ingrown hairs can cause it. Soaking in a hot tub or swimming pool that has bacteria in the water can cause it. It may also occur if a hair follicle is blocked by a bandage.   Sores often go away in a few days with no treatment. In some cases, medicine may be given. A small piece of skin or pus may be taken to find the type of bacteria causing the infection.   Home care  The healthcare provider may prescribe an antibiotic cream or ointment. Antibiotics taken by mouth (oral) may also be prescribed. Or you may be told to use an over-the-counter antibiotic cream. Follow all instructions when using any of these medicines.   General care    Apply warm, moist compresses to the sores for 20 minutes up to 3 times a day. You can make a compress by soaking a cloth in warm water. Squeeze out excess water.    Don t cut, poke, or squeeze the sores. This can be painful and spread infection.    Don t scratch the affected area. Scratching can delay healing.    Don t shave the areas affected by folliculitis.    If the sores leak fluid, cover the area with a nonstick gauze bandage. Use as little tape as possible. Carefully get rid of all soiled bandages.    Dress in loose cotton clothing.    Change sheets and  blankets if they are soiled by pus. Wash all clothes, towels, sheets, and cloth diapers in soap and hot water. Don't share clothes, towels, or sheets with other family members.    Don't soak the sores in bath water. This can spread infection. Instead keep the area clean by gently washing sores with soap and warm water.    Wash your hands or use antibacterial gels often to prevent spreading the bacteria.    Follow-up care  Follow up with your healthcare provider, or as advised.  When to get medical advice  Call your healthcare provider right away if any of these occur:    Fever of 100.4 F (38 C) or higher, or as directed by your provider    Rash spreads    Rash does not get better with treatment    Redness or swelling that gets worse    Rash becomes more painful    Foul-smelling fluid leaking from the skin    Rash improves, but then comes back   [employer] last reviewed this educational content on 8/1/2019 2000-2021 [employer]. All rights reserved. This information is not intended as a substitute for professional medical care. Always follow your healthcare professional's instructions.           Patient Education     Fungal Skin Infection (Tinea)  A fungal infection occurs when too much fungus grows on or in the body. Fungus normally lives on the skin in small amounts and does not cause harm. But when too much grows on the skin, it causes an infection. This is also known as tinea. Fungal skin infections are common and not usually serious.   The infection often starts as a small red area the size of a pea. The skin may turn dry and flaky. The area may itch. As the fungus grows, it spreads out in a red [place]. Because of how it looks, fungal skin infection is often called ringworm, but it is not caused by a worm. Fungal skin infections can occur on many parts of the body. They can grow on the head, chest, arms, buttocks or legs. On the feet, fungal infection is known as  athlete s foot.  It causes itchy,  sometimes painful sores between the toes and the bottom or sides of the feet. In the groin, the rash is called  jock itch.    People with weak immune systems can get a fungal infection more easily. This includes people with diabetes or HIV, or who are being treated for cancer. In these cases, the fungal infection can spread and cause severe illness. Fungal infections are also more common in people who are overweight.   In most cases, treatment is done with antifungal cream or ointment. If the infection is on your scalp, you will need to take oral medicine. To confirm the diagnosis of a fungal infection, the healthcare provider may take a small scraping of the skin to be tested in a lab.   Common fungal infections are treated with creams on the skin or oral medicine.  Home care  Follow all instructions when using antifungal cream or ointment on your skin.   General care:    If you were prescribed an oral medicine, read the patient information. Talk with your healthcare provider about the risks and side effects.    Let your skin dry completely after bathing. Carefully dry your feet and between your toes.    Dress in loose cotton clothing.    Don t scratch the affected area. This can delay healing and may spread the infection. It can also cause a bacterial infection.    Keep your skin clean, but don t wash the skin too much. This can irritate your skin.    Keep in mind that it may take a week before the fungus starts to go away. It can take 2 to 4 weeks to fully clear. To prevent it from coming back, use the medicine until the rash is all gone.  Follow-up care  Follow up with your healthcare provider if the rash does not get better after 10 days of treatment. Also follow up if the rash spreads to other parts of your body.   When to seek medical advice  Call your healthcare provider right away if any of these occur:    Fever of 100.4 F (38 C) or higher, or as directed by your healthcare provider    Redness or swelling  that gets worse    Pain that gets worse    Foul-smelling fluid leaking from the skin  [employer] last reviewed this educational content on 8/1/2019 2000-2021 [employer]. All rights reserved. This information is not intended as a substitute for professional medical care. Always follow your healthcare professional's instructions.           Patient Education     Jock Itch  Jock itch (tinea cruris) is a rash caused by a fungal infection. It occurs in the skin folds between the thigh and groin where it's warm and moist.   It starts as a small, red, itchy patch that grows larger in the shape of a round ring, 1 to 2 inches wide. It may cause the skin to flake. It may also spread to the scrotum or the skin that covers your testicles. This infection is treated with skin creams or oral medicine.   Home care  These tips will help you care for yourself at home:     If you were prescribed a cream, apply it exactly as directed. Some antifungal creams are available without a prescription.    It may take a week before the fungus starts to go away and it can take about 2 to 3 weeks to completely clear. To prevent recurrence, it's important to keep using the medicine until the rash is all gone.    Wash the groin area at least once a day with soap and water. Pat dry and apply the medicine. Wear freshly laundered underwear daily.    Once the rash is gone, keep the area clean and dry to keep it from coming back. If recurrence is a problem, use a medicated antifungal powder daily in the groin area.  Prevention  These tips may help prevent jock itch:     Don't share clothes, towels, or sports gear with others unless they have been washed.    Change underwear daily.    Keep skin clean and dry, especially after showering or swimming.    If you are overweight, lose weight to prevent skin folds from rubbing against each other. .    Don't wear tight or non-cotton underwear.    Treat athlete s foot if it occurs.  Follow-up  care  Follow up with your healthcare provider, or as advised if the rash is not starting to get better after 10 days of treatment or if the rash continues to spread.   When to seek medical care  Get prompt medical attention if any of the following occur:    Fluid drains from the rash    Redness around the rash increases.    Rash doesn't go away with treatment    Rash returns soon after treatment  [employer] last reviewed this educational content on 8/1/2019 2000-2021 [employer]. All rights reserved. This information is not intended as a substitute for professional medical care. Always follow your healthcare professional's instructions.           Patient Education     Vaginal Infection: Understanding the Vaginal Environment  The vagina is a canal. It connects the uterus (womb) to the outside of the body. It's home to many types of bacteria and other tiny organisms. These different bacteria most often stay balanced in number. This keeps the vagina healthy. If the balance changes, it can cause infection.    A healthy environment  Many types of bacteria are present in a healthy vagina. When balanced, they don t cause problems. Small amounts of yeast may also be present without causing problems. The most common type of bacteria in the vagina is lactobacillus. It helps keep the vagina at a low pH. A low pH keeps bad bacteria from taking over.   Normal vaginal discharge  The vagina makes fluid. It's sent out as discharge. This also keeps the vagina healthy. Normal discharge can be clear, white, or yellowish. Most women find that normal discharge varies in amount and color through the month.   An unhealthy environment  The vaginal environment may get out of balance. This may result in a vaginal infection. There are a few reasons this can happen. The pH may have changed. The amount of one organism, such as yeast, may increase. Or an outside organism may get into the vagina and throw off the balance:  "    Bacterial vaginosis (BV). BV is due to an imbalance in the normal bacteria in the vagina. Lactobacillus bacteria decrease. As a result, the numbers of bad bacteria increase.    Candidiasis (yeast infection). Yeast is a type of fungus. A yeast infection occurs when yeast cells in the vagina increase. They then attack vaginal tissues. A type of yeast called Candida albicans is often involved.    Trichomoniasis (\"trich\"). Trich is a parasite. It's passed from one person to another during sex. Men with trich often don t have any symptoms. In women, it can take weeks or months before symptoms appear.  [employer] last reviewed this educational content on 4/1/2020 2000-2021 [employer]. All rights reserved. This information is not intended as a substitute for professional medical care. Always follow your healthcare professional's instructions.           "

## 2022-02-21 ENCOUNTER — LAB (OUTPATIENT)
Dept: LAB | Facility: CLINIC | Age: 52
End: 2022-02-21
Attending: INTERNAL MEDICINE
Payer: COMMERCIAL

## 2022-02-21 DIAGNOSIS — Z11.59 ENCOUNTER FOR SCREENING FOR OTHER VIRAL DISEASES: ICD-10-CM

## 2022-02-21 PROCEDURE — U0003 INFECTIOUS AGENT DETECTION BY NUCLEIC ACID (DNA OR RNA); SEVERE ACUTE RESPIRATORY SYNDROME CORONAVIRUS 2 (SARS-COV-2) (CORONAVIRUS DISEASE [COVID-19]), AMPLIFIED PROBE TECHNIQUE, MAKING USE OF HIGH THROUGHPUT TECHNOLOGIES AS DESCRIBED BY CMS-2020-01-R: HCPCS

## 2022-02-22 ENCOUNTER — OFFICE VISIT (OUTPATIENT)
Dept: FAMILY MEDICINE | Facility: CLINIC | Age: 52
End: 2022-02-22
Payer: COMMERCIAL

## 2022-02-22 VITALS — DIASTOLIC BLOOD PRESSURE: 64 MMHG | SYSTOLIC BLOOD PRESSURE: 122 MMHG

## 2022-02-22 DIAGNOSIS — R21 RASH AND NONSPECIFIC SKIN ERUPTION: Primary | ICD-10-CM

## 2022-02-22 DIAGNOSIS — B35.6 TINEA CRURIS: ICD-10-CM

## 2022-02-22 LAB — SARS-COV-2 RNA RESP QL NAA+PROBE: NEGATIVE

## 2022-02-22 PROCEDURE — 88312 SPECIAL STAINS GROUP 1: CPT | Performed by: DERMATOLOGY

## 2022-02-22 PROCEDURE — 88305 TISSUE EXAM BY PATHOLOGIST: CPT | Performed by: DERMATOLOGY

## 2022-02-22 PROCEDURE — 11104 PUNCH BX SKIN SINGLE LESION: CPT | Performed by: PHYSICIAN ASSISTANT

## 2022-02-22 PROCEDURE — 99203 OFFICE O/P NEW LOW 30 MIN: CPT | Mod: 25 | Performed by: PHYSICIAN ASSISTANT

## 2022-02-22 PROCEDURE — 11105 PUNCH BX SKIN EA SEP/ADDL: CPT | Performed by: PHYSICIAN ASSISTANT

## 2022-02-22 RX ORDER — KETOCONAZOLE 20 MG/G
CREAM TOPICAL 2 TIMES DAILY
Qty: 120 G | Refills: 1 | Status: SHIPPED | OUTPATIENT
Start: 2022-02-22 | End: 2022-02-23

## 2022-02-22 NOTE — PATIENT INSTRUCTIONS
Wound Care Instructions     FOR SUTURE CARE     [facility] [phone]  [facility] [phone]                 AFTER 24 HOURS YOU SHOULD REMOVE THE BANDAGE AND BEGIN DAILY DRESSING CHANGES AS FOLLOWS:     1) Remove Dressing.     2) Clean and dry the area with tap water using a Q-tip or sterile gauze pad.     3) Apply Vaseline, Aquaphor, Polysporin ointment or Bacitracin ointment over entire wound.  Do NOT use Neosporin ointment.     4) Cover the wound with a band-aid, or a sterile non-stick gauze pad and micropore paper tape      REPEAT THESE INSTRUCTIONS AT LEAST ONCE A DAY UNTIL THE WOUND HAS COMPLETELY HEALED.    RETURN TO CLINIC FOR A NURSE-ONLY SUTURE REMOVAL APPOINTMENT IN 14DAYS. UNLESS YOUR PROVIDER SPECIFIES OTHERWISE.     It is an old wives tale that a wound heals better when it is exposed to air and allowed to dry out. The wound will heal faster with a better cosmetic result if it is kept moist with ointment and covered with a bandage.    **Do not let the wound dry out.**      Supplies Needed:      *Cotton tipped applicators (Q-tips)    *Vaseline, Aquaphor, Polysporin Ointment or Bacitracin Ointment (NOT NEOSPORIN)    *Band-aids or non-stick gauze pads and micropore paper tape.          PATIENT INFORMATION:  During the healing process you will notice a number of changes. All wounds develop a small halo of redness surrounding the wound.  This means healing is occurring. Severe itching with extensive redness usually indicates sensitivity to the ointment or bandage tape used to dress the wound.  You should call our office if this develops.      Swelling  and/or discoloration around your surgical site is common, particularly when performed around the eye.    After the wound is healed you may discontinue dressing changes.     You may experience a sensation of tightness as your wound heals. This is normal and will gradually subside.    Your healed wound may be sensitive to temperature  changes. This sensitivity improves with time, but if you re having a lot of discomfort, try to avoid temperature extremes.    Patients frequently experience itching after their wound appears to have healed because of the continue healing under the skin.  Plain Vaseline will help relieve the itching.      POSSIBLE COMPLICATIONS    BLEEDIN. Leave the bandage in place.  2. Use tightly rolled up gauze or a cloth to apply direct pressure over the bandage for 30  minutes.  3. Reapply pressure for an additional 30 minutes if necessary  4. Use additional gauze and tape to maintain pressure once the bleeding has stopped.  5.

## 2022-02-22 NOTE — PROGRESS NOTES
[facility] Dermatology Note  Encounter Date: Feb 22, 2022  Office Visit     Dermatology Problem List:  1. Non specific eruption, groin and lower legs, ddx plaque psoriasis vs nummular dermatitis  - s/p punch bx 2/22/22  2. Non specific eruption, thighs, arms and axillae, ddx folliculitis vs other  - s/p punch bx 2/22/22  ____________________________________________    Assessment & Plan:    # Non specific eruption, groin and lower legs, ddx plaque psoriasis vs nummular dermatitis   - Punch biopsy today of R calf, see procedure note below    -Start triamcinolone 0.025% cream bid to affected areas.     # Non specific eruption, thighs, arms and axillae, ddx folliculitis vs other   - Punch biopsy today of R upper thigh, see procedure note below    -Finish course of Augmentin through Thursday.     Procedures Performed:   - Punch biopsy procedure note, location(s): R calf and R upper thigh. After discussion of benefits and risks including but not limited to bleeding, infection, scar, incomplete removal, recurrence, and non-diagnostic biopsy, written consent and photographs were obtained. The area was cleaned with isopropyl alcohol. 0.5mL of 1% lidocaine with epinephrine was injected to obtain adequate anesthesia and a 4 mm punch biopsy was performed at site(s). 4-0 Prolene sutures were utilized to approximate the epidermal edges. White petrolatum ointment and a bandage was applied to the wound. Explicit verbal and written wound care instructions were provided. The patient left the dermatology clinic in good condition.     Follow-up: pending path results    Staff and Scribe:     Scribe Disclosure:  I, Carmen Flor, am serving as a scribe to document services personally performed by Jeannette Babb PA-C based on data collection and the provider's statements to me.     Provider Disclosure:   The documentation recorded by the scribe accurately reflects the services I personally performed and the  decisions made by me.    All risks, benefits and alternatives were discussed with patient.  Patient is in agreement and understands the assessment and plan.  All questions were answered.  Sun Screen Education was given.   Return to Clinic in 1 month or sooner as needed.   Jeannette Babb PA-C   [facility]   ____________________________________________    CC: Derm Problem (rash various areas on body was seen in  on 2/19/22, pt feels sx are worsening)    HPI:  Ms. Mildred Tellez is a(n) 51 year old female who presents today as a return patient for rash. Last seen in dermatology by Dr. Feliz on 6/12/19 at which point she was advised to use TMC 0.1% cream twice daily for two weeks for treatment of an insect bite.     Today, the patient noticed a dry patch of skin on her R shin in January 2022 while in [place]. Applying warm olive oil helped with the itching and cracking skin, but a moisturizer was not helpful. In the last week, she developed red, raised, itchy bumps that started on her R thigh but has spread to her groin, arms, and axillae. Her  and kids do not have any similar rashes. She was seen at an Urgent Care this weekend who prescribe ketoconazole cream, which helped the itching, but the rash is overall worsened. They also prescribe Augmentin, for a folliculitis eruption but she has yet to notice improvement. She reports slightly more fatigue, but otherwise denies any systemic symptoms like headaches.     Personal history of hand, foot, and mouth disease as well as shingles. She received the shingles vaccine last year. No family history of psoriasis or eczema. Patient is otherwise feeling well, without additional skin concerns.    Labs Reviewed:  N/A    Physical Exam:  Vitals: /64   SKIN: Full skin, which includes the head/face, both arms, chest, back, abdomen,both legs, genitalia and/or groin buttocks, digits and/or nails, was examined.  - Erythematous ill-defined  plaques to the inguinal creases bilaterally.   - A few pink scattered papules and plaques on the lower legs as well as scattered pustules versus vesicles on the axilla and anterior thighs.   - No other lesions of concern on areas examined.                       Medications:  Current Outpatient Medications   Medication     amoxicillin-clavulanate (AUGMENTIN) 875-125 MG tablet     augmented betamethasone dipropionate (DIPROLENE-AF) 0.05 % external cream     fluconazole (DIFLUCAN) 150 MG tablet     ketoconazole (NIZORAL) 2 % external cream     levothyroxine (SYNTHROID/LEVOTHROID) 137 MCG tablet     No current facility-administered medications for this visit.      Past Medical History:   Patient Active Problem List   Diagnosis     Hypothyroidism, unspecified type     Rosacea     Hyperlipidemia LDL goal <160     RLQ abdominal pain - when getting up from sitting in area of appy     Abnormal Pap smear, can't excl hi gd sq intraepithelial lesion (ASC-H)     Compound nevus of neck - with dysplastic features - right neck 11/2013     ASCUS pap -  7/2013 - [place] [place]      History of dysplastic nevus     Cervicalgia     Gas bloat syndrome     Overweight (BMI 25.0-29.9)     Food sensitivity with gastrointestinal symptoms- plants in the nightshade family - tomatoes, potatoes, eggplant, tomatillos , peppers      Multiple pigmented nevi     Painful lumpy left breast- resolved      Past Medical History:   Diagnosis Date     Abnormal Pap smear, can't excl hi gd sq intraepithelial lesion (ASC-H) 7/2013    colp neg     Bleeding gastric ulcer 1997     from taking aspirin at age 27 - couldn't tell whether resolved      Coxsackie virus infection - severe - summer of 2016 - hands and feet totally peeled and oral lesions very painful 9/10/2016     Hypothyroidism     after first pregnancy      Mastodynia- left breast- resolved  10/4/2013     Papanicolaou smear of cervix with atypical squamous cells of undetermined  significance (ASC-US) 7/2015     neg HPV, needs colp     Post op infection     s/p her appy as a child      Postpartum septic endometritis     fr.  prolonged rupture     RLQ abdominal pain     when getting up from sitting in area of appy        CC No referring provider defined for this encounter. on close of this encounter.

## 2022-02-22 NOTE — LETTER
2/22/2022         RE: Mildred Tellez  [address]  [place] [place] [facility] [address]        Dear Colleague,    Thank you for referring your patient, Mildred Tellez, to the [facility]. Please see a copy of my visit note below.    [facility] Dermatology Note  Encounter Date: Feb 22, 2022  Office Visit     Dermatology Problem List:  1. Non specific eruption, groin and lower legs, ddx plaque psoriasis vs nummular dermatitis  - s/p punch bx 2/22/22  2. Non specific eruption, thighs, arms and axillae, ddx folliculitis vs other  - s/p punch bx 2/22/22  ____________________________________________    Assessment & Plan:    # Non specific eruption, groin and lower legs, ddx plaque psoriasis vs nummular dermatitis   - Punch biopsy today of R calf, see procedure note below    -Start triamcinolone 0.025% cream bid to affected areas.     # Non specific eruption, thighs, arms and axillae, ddx folliculitis vs other   - Punch biopsy today of R upper thigh, see procedure note below    -Finish course of Augmentin through Thursday.     Procedures Performed:   - Punch biopsy procedure note, location(s): R calf and R upper thigh. After discussion of benefits and risks including but not limited to bleeding, infection, scar, incomplete removal, recurrence, and non-diagnostic biopsy, written consent and photographs were obtained. The area was cleaned with isopropyl alcohol. 0.5mL of 1% lidocaine with epinephrine was injected to obtain adequate anesthesia and a 4 mm punch biopsy was performed at site(s). 4-0 Prolene sutures were utilized to approximate the epidermal edges. White petrolatum ointment and a bandage was applied to the wound. Explicit verbal and written wound care instructions were provided. The patient left the dermatology clinic in good condition.     Follow-up: pending path results    Staff and Scribe:     Scribe Disclosure:  I, Carmen Flor, am serving as a scribe to  document services personally performed by Jeannette Babb PA-C based on data collection and the provider's statements to me.     Provider Disclosure:   The documentation recorded by the scribe accurately reflects the services I personally performed and the decisions made by me.    All risks, benefits and alternatives were discussed with patient.  Patient is in agreement and understands the assessment and plan.  All questions were answered.  Sun Screen Education was given.   Return to Clinic in 1 month or sooner as needed.   Jeannette Babb PA-C   [facility]   ____________________________________________    CC: Derm Problem (rash various areas on body was seen in  on 2/19/22, pt feels sx are worsening)    HPI:  Ms. Mildred Tellez is a(n) 51 year old female who presents today as a return patient for rash. Last seen in dermatology by Dr. Feliz on 6/12/19 at which point she was advised to use TMC 0.1% cream twice daily for two weeks for treatment of an insect bite.     Today, the patient noticed a dry patch of skin on her R shin in January 2022 while in [place]. Applying warm olive oil helped with the itching and cracking skin, but a moisturizer was not helpful. In the last week, she developed red, raised, itchy bumps that started on her R thigh but has spread to her groin, arms, and axillae. Her  and kids do not have any similar rashes. She was seen at an Urgent Care this weekend who prescribe ketoconazole cream, which helped the itching, but the rash is overall worsened. They also prescribe Augmentin, for a folliculitis eruption but she has yet to notice improvement. She reports slightly more fatigue, but otherwise denies any systemic symptoms like headaches.     Personal history of hand, foot, and mouth disease as well as shingles. She received the shingles vaccine last year. No family history of psoriasis or eczema. Patient is otherwise feeling well, without additional  skin concerns.    Labs Reviewed:  N/A    Physical Exam:  Vitals: /64   SKIN: Full skin, which includes the head/face, both arms, chest, back, abdomen,both legs, genitalia and/or groin buttocks, digits and/or nails, was examined.  - Erythematous ill-defined plaques to the inguinal creases bilaterally.   - A few pink scattered papules and plaques on the lower legs as well as scattered pustules versus vesicles on the axilla and anterior thighs.   - No other lesions of concern on areas examined.                       Medications:  Current Outpatient Medications   Medication     amoxicillin-clavulanate (AUGMENTIN) 875-125 MG tablet     augmented betamethasone dipropionate (DIPROLENE-AF) 0.05 % external cream     fluconazole (DIFLUCAN) 150 MG tablet     ketoconazole (NIZORAL) 2 % external cream     levothyroxine (SYNTHROID/LEVOTHROID) 137 MCG tablet     No current facility-administered medications for this visit.      Past Medical History:   Patient Active Problem List   Diagnosis     Hypothyroidism, unspecified type     Rosacea     Hyperlipidemia LDL goal <160     RLQ abdominal pain - when getting up from sitting in area of appy     Abnormal Pap smear, can't excl hi gd sq intraepithelial lesion (ASC-H)     Compound nevus of neck - with dysplastic features - right neck 11/2013     ASCUS pap -  7/2013 - saw Dr. Beltran ob/gyn [place]      History of dysplastic nevus     Cervicalgia     Gas bloat syndrome     Overweight (BMI 25.0-29.9)     Food sensitivity with gastrointestinal symptoms- plants in the nightshade family - tomatoes, potatoes, eggplant, tomatillos , peppers      Multiple pigmented nevi     Painful lumpy left breast- resolved      Past Medical History:   Diagnosis Date     Abnormal Pap smear, can't excl hi gd sq intraepithelial lesion (ASC-H) 7/2013    colp neg     Bleeding gastric ulcer 1997     from taking aspirin at age 27 - couldn't tell whether resolved      Coxsackie virus infection - severe - summer  of 2016 - hands and feet totally peeled and oral lesions very painful 9/10/2016     Hypothyroidism     after first pregnancy      Mastodynia- left breast- resolved  10/4/2013     Papanicolaou smear of cervix with atypical squamous cells of undetermined significance (ASC-US) 7/2015     neg HPV, needs colp     Post op infection     s/p her appy as a child      Postpartum septic endometritis     fr.  prolonged rupture     RLQ abdominal pain     when getting up from sitting in area of appy        CC No referring provider defined for this encounter. on close of this encounter.        Again, thank you for allowing me to participate in the care of your patient.        Sincerely,        Jeannette Babb PA-C

## 2022-02-23 ENCOUNTER — TELEPHONE (OUTPATIENT)
Dept: FAMILY MEDICINE | Facility: CLINIC | Age: 52
End: 2022-02-23
Payer: COMMERCIAL

## 2022-02-23 DIAGNOSIS — R21 RASH: Primary | ICD-10-CM

## 2022-02-23 DIAGNOSIS — R21 RASH AND NONSPECIFIC SKIN ERUPTION: ICD-10-CM

## 2022-02-23 RX ORDER — KETOCONAZOLE 20 MG/G
CREAM TOPICAL 2 TIMES DAILY
Qty: 180 G | Refills: 1 | Status: SHIPPED | OUTPATIENT
Start: 2022-02-23 | End: 2022-03-29

## 2022-02-23 NOTE — TELEPHONE ENCOUNTER
See previus message- patient states she need 3 tubes a month for coverage- do we need to update the script with location to get more prescription. She would also like this sent to [facility] pharmacy- pended    Theresa DESOUZARN BSN  [facility]- [place]  [phone]

## 2022-02-23 NOTE — TELEPHONE ENCOUNTER
[facility] Call Center    Phone Message    May a detailed message be left on voicemail: yes     Reason for Call: Medication Question or concern regarding medication   Prescription Clarification  Name of Medication: ketoconazole (NIZORAL) 2 % external cream     Prescribing Provider:   Jeannette Babb     Pharmacy:    [facility] [place]     What on the order needs clarification?   Please re-send Rx to  clinic pharmacy in [place]      Action Taken: Other: [facility]    Travel Screening: Not Applicable

## 2022-02-23 NOTE — TELEPHONE ENCOUNTER
[facility] Call Center    Phone Message    May a detailed message be left on voicemail: yes     Reason for Call: Medication Question or concern regarding medication   Prescription Clarification  Name of Medication:   ketoconazole (NIZORAL) 2 % external cream     Prescribing Provider:   Jeannette Babb     Pharmacy:   !!  clinic in [place]!!   Different location     What on the order needs clarification?     Pt states she needs a PA to receive more than on Rx/refills- Insurance allows only 1 Rx.    Pt states she needs 3 Rx a month.      Action Taken: Message routed to:  Other: [facility]    Travel Screening: Not Applicable     Pt requests a call from clinic to advise when the PA is sent to insurance.

## 2022-02-24 ENCOUNTER — TELEPHONE (OUTPATIENT)
Dept: FAMILY MEDICINE | Facility: CLINIC | Age: 52
End: 2022-02-24
Payer: COMMERCIAL

## 2022-02-24 DIAGNOSIS — Z11.59 ENCOUNTER FOR SCREENING FOR OTHER VIRAL DISEASES: Primary | ICD-10-CM

## 2022-02-24 DIAGNOSIS — R21 RASH: Primary | ICD-10-CM

## 2022-02-24 RX ORDER — TRIAMCINOLONE ACETONIDE 0.25 MG/G
CREAM TOPICAL 2 TIMES DAILY
Qty: 454 G | Refills: 0 | Status: SHIPPED | OUTPATIENT
Start: 2022-02-24 | End: 2022-03-29

## 2022-02-24 NOTE — TELEPHONE ENCOUNTER
Called patient and discussed the medication - applying very thinly  discussed Goodrx and Familywize  Advised that we cannot do anything more until the pathology comes back    Theresa DESOUZARN BSN  [facility]- [place]  [phone]

## 2022-02-24 NOTE — TELEPHONE ENCOUNTER
[facility] Call Center    Phone Message    May a detailed message be left on voicemail: yes     Reason for Call: Symptoms or Concerns     If patient has red-flag symptoms, warm transfer to triage line    Current symptom or concern: The pt's rash is getting worse and spreading. SHe needs to speak to someone today.     Symptoms have been present for:  2+ day(s)    Has patient previously been seen for this? Yes    By : ASH Babb    Date: 2.22.22    Are there any new or worsening symptoms? Yes: see above      Action Taken: Message routed to:  Other:  skin clinic    Travel Screening: Not Applicable

## 2022-02-24 NOTE — TELEPHONE ENCOUNTER
Prior Authorization Specialty Medication Request    Medication/Dose: needs larger qty as she applied to whole body and insurance states they will only pay for one 60 g tube ryanne month  ICD code (if different than what is on RX):  ketaconazole  Previously Tried and Failed:      Important Lab Values:   Rationale:     Insurance Name:   Insurance ID:   Insurance Phone Number:     Pharmacy Information (if different than what is on RX)  Name:    Phone:

## 2022-02-24 NOTE — TELEPHONE ENCOUNTER
Margret Gonzales, RN 2 hours ago (1:10 PM)     AK       Pt called to update of new provider orders. Voicemail left for patient to call back.

## 2022-02-24 NOTE — TELEPHONE ENCOUNTER
[facility] Call Center    Phone Message    May a detailed message be left on voicemail: no     Reason for Call: Other: Patient stating that she needs the cream urgently as her itching is worsening. She was informed that refill requests can take up to 72 hours. Please call to confirm that it will be filled and if she needs to take any other steps.[phone]. Thank you.     Action Taken: Message routed to:  Other: [facility]    Travel Screening: Not Applicable

## 2022-02-24 NOTE — TELEPHONE ENCOUNTER
Patent states that the main concern is that the rash is spreading to arms and back as well- arms are 90% covered - at appointment there were half covered.    Advised that she will need to continue to use the prescribed medication until we get the path results back- this can take 1-3 weeks    Steroid sent to pharmacy see other message.  Spent over 30 minutes on phone with patient

## 2022-02-24 NOTE — TELEPHONE ENCOUNTER
Let's start a mild topical steroid for those areas, triamcinolone 0.025% cream bid.    Thanks, Jeannette

## 2022-02-24 NOTE — TELEPHONE ENCOUNTER
Is there anything else to prescribe?    Theresa DESOUZARN BSN  [facility]  [phone]

## 2022-02-24 NOTE — TELEPHONE ENCOUNTER
duplicate - see other message.    Theresa DESOUZARN BSN  [facility]  [phone]

## 2022-02-26 NOTE — PROGRESS NOTES
[facility] - PRIMARY CARE SKIN    CC : skin cancer screening (full-body)  SUBJECTIVE:                                                    Mildred Tellez is a 46 year old female who presents to clinic today for a full-body skin exam because of her history of dysplastic nevi.    Bothersome lesions noticed by the patient or other skin concerns :  Issue One : She requests re-evaluation of a mole on the right forearm and a biopsy-proven lesion of lymphoid infiltrate on the left ear.    Tissue Pathology Report from 9/23/2016    Personal history of skin cancer : NO - history of mild dysplastic nevi on left abdomen and left lower back.  Family history of skin cancer : NO.    Sun Exposure History  Sunscreen Use : YES.  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    Refer to electronic medical record (EMR) for past medical history and medications.    INTEGUMENTARY/SKIN: POSITIVE for changing lesions  ROS : 14 point review of systems was negative except the symptoms listed above in the HPI.    This document serves as a record of the services and decisions personally performed and made by Amelie Feliz MD. It was created on her behalf by Aubrey Walsh, a trained medical scribe.  The creation of this document is based on the scribe's personal observations and the provider's statements to the medical scribe.  Aubrey Walsh, March 24, 2017 12:13 PM      OBJECTIVE:                                                    GENERAL: healthy, alert and no distress  SKIN: [place] Skin Type - III.  This patient was examined from the top of the head to the bottom of the feet  including scalp, face, neck, back, chest, breasts, buttocks, both arms, both legs, both hands, both feet, all 10 fingers and all 10 toes. The dermatoscope was used to help evaluate pigmented lesions.  Skin Pertinent Findings:  Behind left ear : No recurrent lesion.    Arms, Back, Chest, Abdomen, Legs : Multiple, 2 mm - 4 mm in  size, brown macules most consistent with benign nevi (melanocytic nevi).    Left upper back : 22 mm x 15 mm in size, flat, brown macule consistent with a congenital nevus.    Left sole : 2 mm in size, hyperkeratotic tissue consistent with corn.    Right labia : 1 cm in size, benign, white lesion consistent with cyst.    Diagnostic Test Results:  none           ASSESSMENT:                                                      Encounter Diagnoses   Name Primary?     Skin cancer screening Yes     History of dysplastic nevus          PLAN:                                                    There are no Patient Instructions on file for this visit.    The patient was counseled about sunscreens and sun avoidance. The patient was counseled to check the skin regularly and report any lesion that is new, changing, itching, scabbing, bleeding or otherwise bothersome. The patient was discharged ambulatory and in stable condition.    Recommendations : q1 year  skin exams.      PROCEDURES:                                                    None.    TT : 25 minutes.  CT : 15 minutes.      The information in this document, created by the medical scribe for me, accurately reflects the services I personally performed and the decisions made by me. I have reviewed and approved this document for accuracy prior to leaving the patient care area.  Amelie Feliz MD March 24, 2017 12:13 PM  [facility] - PRIMARY CARE SKIN   26-Feb-2022 11:09

## 2022-03-03 ENCOUNTER — TELEPHONE (OUTPATIENT)
Dept: FAMILY MEDICINE | Facility: CLINIC | Age: 52
End: 2022-03-03
Payer: COMMERCIAL

## 2022-03-03 RX ORDER — TRIAMCINOLONE ACETONIDE 0.25 MG/G
OINTMENT TOPICAL 2 TIMES DAILY
Qty: 454 G | Refills: 1 | Status: SHIPPED | OUTPATIENT
Start: 2022-03-03 | End: 2022-03-29

## 2022-03-03 NOTE — TELEPHONE ENCOUNTER
"Pt called with no answer. LVM as stated in coordination encounter to notify of new statement from provider as well as pathology progress.:     \"I sent the triamcionlone 0.025% cream instead. It sounds like this is helping, So I will send the ointment version instead. And this may last longer.     It looks like her results are still in process.     Thanks, Jeannette\"    Margret AWAN RN,   . [facility]   [phone]    "

## 2022-03-03 NOTE — TELEPHONE ENCOUNTER
[facility] Call Center    Phone Message    May a detailed message be left on voicemail: yes     Reason for Call: Medication Refill Request    Has the patient contacted the pharmacy for the refill? Yes   Name of medication being requested: Ketoconazole 2% cream  Provider who prescribed the medication: Skinny BAXTER  Pharmacy: [facility] - [place], [place] - [address]  Date medication is needed: Pt said she needs a PA for her refill. Please review Thanks   Also Pt just asked about her test results. She thinks it was from 02/22. It was done here with [facility]. Please call Pt back to discuss. Thanks      Action Taken: Message routed to:  [facility] Derm    Travel Screening: Not Applicable

## 2022-03-03 NOTE — TELEPHONE ENCOUNTER
Central Prior Authorization Team   Phone: [phone]    PA Initiation    Medication: Ketoconazole 2% cream  Insurance Company: [employer] - Phone [phone] Fax [phone]  Pharmacy Filling the Rx: [facility] - [place], [place] - [address] [place] [address]  Filling Pharmacy Phone: [phone]  Filling Pharmacy Fax:    Start Date: 3/3/2022  Manually faxed request

## 2022-03-03 NOTE — TELEPHONE ENCOUNTER
[facility] Call Center    Phone Message    May a detailed message be left on voicemail: yes     Reason for Call: Medication Refill Request    Has the patient contacted the pharmacy for the refill? Yes   Name of medication being requested: Rx triamcinolone (KENALOG) 0.025 % cream  Provider who prescribed the medication: Skinny BAXTER  Pharmacy: [place], [place] - [address] [place] [address]  Date medication is needed: ASAP    Pt will not have enough. Pt said the Rx is working, but rash is spreading and she is using more Rx then planned. Please review. Thanks    Action Taken: Message routed to:  [facility] Derm    Travel Screening: Not Applicable

## 2022-03-03 NOTE — TELEPHONE ENCOUNTER
I sent the triamcionlone 0.025% cream instead. It sounds like this is helping, So I will send the ointment version instead. And this may last longer.    It looks like her results are still in process.    Thanks, Jeannette

## 2022-03-03 NOTE — TELEPHONE ENCOUNTER
See telephone note from 2/24/22  triamcionlone 0.025% was refilled   Changed to ointment which should last longer and a message was left with the pt

## 2022-03-06 ENCOUNTER — HEALTH MAINTENANCE LETTER (OUTPATIENT)
Age: 52
End: 2022-03-06

## 2022-03-06 DIAGNOSIS — E03.9 HYPOTHYROIDISM, UNSPECIFIED TYPE: Primary | ICD-10-CM

## 2022-03-10 LAB
PATH REPORT.COMMENTS IMP SPEC: NORMAL
PATH REPORT.FINAL DX SPEC: NORMAL
PATH REPORT.GROSS SPEC: NORMAL
PATH REPORT.MICROSCOPIC SPEC OTHER STN: NORMAL
PATH REPORT.RELEVANT HX SPEC: NORMAL

## 2022-03-10 NOTE — TELEPHONE ENCOUNTER
Prior Authorization Approval    Authorization Effective Date: 3/5/2022  Authorization Expiration Date: 4/4/2022  Medication: Ketoconazole 2% cream  Approved Dose/Quantity:    Reference #:     Insurance Company: [employer] - Phone [phone] Fax [phone]  Expected CoPay:       CoPay Card Available:      Foundation Assistance Needed:    Which Pharmacy is filling the prescription (Not needed for infusion/clinic administered): [facility] - [place], [place] - [address] [place] [address]  Pharmacy Notified: Yes  Patient Notified: Yes

## 2022-03-14 ENCOUNTER — TELEPHONE (OUTPATIENT)
Dept: FAMILY MEDICINE | Facility: CLINIC | Age: 52
End: 2022-03-14
Payer: COMMERCIAL

## 2022-03-14 NOTE — TELEPHONE ENCOUNTER
[facility] Call Center    Phone Message    May a detailed message be left on voicemail: yes     Reason for Call: Medication Question or concern regarding medication   Prescription Clarification  Name of Medication: ketoconazole (NIZORAL) 2 % external cream & triamcinolone (KENALOG) 0.025 % external ointment  Prescribing Provider: Jeannette Babb PA-C   Pharmacy: NA   What on the order needs clarification? Pt would like to know if she should stop taking the Ketaconazole and how long she can use the Tramcinolone. Please call Pt back to discuss. Thank you,     Action Taken: Message routed to:  Other: [facility]    Travel Screening: Not Applicable

## 2022-03-14 NOTE — TELEPHONE ENCOUNTER
She can stop using the ketoconazole.    She can stop using the triamcinolone once the rashes resolve.    Thanks, Jeannette

## 2022-03-15 NOTE — TELEPHONE ENCOUNTER
Patient called and notified of provider instructions (see below). Patient verbalized understanding.     Jeannette Babb PA-C 14 hours ago (5:49 PM)     DG       She can stop using the ketoconazole.     She can stop using the triamcinolone once the rashes resolve.     Thanks, Jeannette         Documentation      Margret AWAN RN,   [facility]   [phone]

## 2022-03-19 ENCOUNTER — LAB (OUTPATIENT)
Dept: URGENT CARE | Facility: URGENT CARE | Age: 52
End: 2022-03-19
Attending: INTERNAL MEDICINE
Payer: COMMERCIAL

## 2022-03-19 DIAGNOSIS — Z11.59 ENCOUNTER FOR SCREENING FOR OTHER VIRAL DISEASES: ICD-10-CM

## 2022-03-19 PROCEDURE — U0003 INFECTIOUS AGENT DETECTION BY NUCLEIC ACID (DNA OR RNA); SEVERE ACUTE RESPIRATORY SYNDROME CORONAVIRUS 2 (SARS-COV-2) (CORONAVIRUS DISEASE [COVID-19]), AMPLIFIED PROBE TECHNIQUE, MAKING USE OF HIGH THROUGHPUT TECHNOLOGIES AS DESCRIBED BY CMS-2020-01-R: HCPCS

## 2022-03-19 PROCEDURE — U0005 INFEC AGEN DETEC AMPLI PROBE: HCPCS

## 2022-03-20 LAB — SARS-COV-2 RNA RESP QL NAA+PROBE: NEGATIVE

## 2022-03-23 ENCOUNTER — HOSPITAL ENCOUNTER (OUTPATIENT)
Facility: CLINIC | Age: 52
Discharge: HOME | End: 2022-03-23
Attending: INTERNAL MEDICINE | Admitting: INTERNAL MEDICINE
Payer: COMMERCIAL

## 2022-03-23 VITALS
RESPIRATION RATE: 14 BRPM | OXYGEN SATURATION: 98 % | DIASTOLIC BLOOD PRESSURE: 62 MMHG | HEART RATE: 60 BPM | TEMPERATURE: 97.5 F | SYSTOLIC BLOOD PRESSURE: 97 MMHG

## 2022-03-23 LAB — COLONOSCOPY: NORMAL

## 2022-03-23 PROCEDURE — 88305 TISSUE EXAM BY PATHOLOGIST: CPT | Mod: 26 | Performed by: PATHOLOGY

## 2022-03-23 PROCEDURE — 45380 COLONOSCOPY AND BIOPSY: CPT | Performed by: INTERNAL MEDICINE

## 2022-03-23 PROCEDURE — G0500 MOD SEDAT ENDO SERVICE >5YRS: HCPCS | Performed by: INTERNAL MEDICINE

## 2022-03-23 PROCEDURE — 45385 COLONOSCOPY W/LESION REMOVAL: CPT | Performed by: INTERNAL MEDICINE

## 2022-03-23 PROCEDURE — 250N000011 HC RX IP 250 OP 636: Performed by: INTERNAL MEDICINE

## 2022-03-23 PROCEDURE — 88305 TISSUE EXAM BY PATHOLOGIST: CPT | Mod: TC | Performed by: INTERNAL MEDICINE

## 2022-03-23 RX ORDER — NALOXONE HYDROCHLORIDE 0.4 MG/ML
0.2 INJECTION, SOLUTION INTRAMUSCULAR; INTRAVENOUS; SUBCUTANEOUS
Status: DISCONTINUED | OUTPATIENT
Start: 2022-03-23 | End: 2022-03-23 | Stop reason: HOSPADM

## 2022-03-23 RX ORDER — ONDANSETRON 4 MG/1
4 TABLET, ORALLY DISINTEGRATING ORAL EVERY 6 HOURS PRN
Status: DISCONTINUED | OUTPATIENT
Start: 2022-03-23 | End: 2022-03-23 | Stop reason: HOSPADM

## 2022-03-23 RX ORDER — LIDOCAINE 40 MG/G
CREAM TOPICAL
Status: DISCONTINUED | OUTPATIENT
Start: 2022-03-23 | End: 2022-03-23 | Stop reason: HOSPADM

## 2022-03-23 RX ORDER — ONDANSETRON 2 MG/ML
4 INJECTION INTRAMUSCULAR; INTRAVENOUS
Status: DISCONTINUED | OUTPATIENT
Start: 2022-03-23 | End: 2022-03-23 | Stop reason: HOSPADM

## 2022-03-23 RX ORDER — PROCHLORPERAZINE MALEATE 10 MG
10 TABLET ORAL EVERY 6 HOURS PRN
Status: DISCONTINUED | OUTPATIENT
Start: 2022-03-23 | End: 2022-03-23 | Stop reason: HOSPADM

## 2022-03-23 RX ORDER — SIMETHICONE 40MG/0.6ML
133 SUSPENSION, DROPS(FINAL DOSAGE FORM)(ML) ORAL
Status: DISCONTINUED | OUTPATIENT
Start: 2022-03-23 | End: 2022-03-23 | Stop reason: HOSPADM

## 2022-03-23 RX ORDER — FENTANYL CITRATE 0.05 MG/ML
50-100 INJECTION, SOLUTION INTRAMUSCULAR; INTRAVENOUS EVERY 5 MIN PRN
Status: DISCONTINUED | OUTPATIENT
Start: 2022-03-23 | End: 2022-03-23 | Stop reason: HOSPADM

## 2022-03-23 RX ORDER — FLUMAZENIL 0.1 MG/ML
0.2 INJECTION, SOLUTION INTRAVENOUS
Status: DISCONTINUED | OUTPATIENT
Start: 2022-03-23 | End: 2022-03-23 | Stop reason: HOSPADM

## 2022-03-23 RX ORDER — DIPHENHYDRAMINE HYDROCHLORIDE 50 MG/ML
25-50 INJECTION INTRAMUSCULAR; INTRAVENOUS
Status: DISCONTINUED | OUTPATIENT
Start: 2022-03-23 | End: 2022-03-23 | Stop reason: HOSPADM

## 2022-03-23 RX ORDER — ONDANSETRON 2 MG/ML
4 INJECTION INTRAMUSCULAR; INTRAVENOUS EVERY 6 HOURS PRN
Status: DISCONTINUED | OUTPATIENT
Start: 2022-03-23 | End: 2022-03-23 | Stop reason: HOSPADM

## 2022-03-23 RX ORDER — NALOXONE HYDROCHLORIDE 0.4 MG/ML
0.4 INJECTION, SOLUTION INTRAMUSCULAR; INTRAVENOUS; SUBCUTANEOUS
Status: DISCONTINUED | OUTPATIENT
Start: 2022-03-23 | End: 2022-03-23 | Stop reason: HOSPADM

## 2022-03-23 RX ORDER — EPINEPHRINE 1 MG/ML
0.1 INJECTION, SOLUTION INTRAMUSCULAR; SUBCUTANEOUS
Status: DISCONTINUED | OUTPATIENT
Start: 2022-03-23 | End: 2022-03-23 | Stop reason: HOSPADM

## 2022-03-23 RX ORDER — ATROPINE SULFATE 0.4 MG/ML
1 AMPUL (ML) INJECTION
Status: DISCONTINUED | OUTPATIENT
Start: 2022-03-23 | End: 2022-03-23 | Stop reason: HOSPADM

## 2022-03-23 RX ADMIN — FENTANYL CITRATE 50 MCG: 0.05 INJECTION, SOLUTION INTRAMUSCULAR; INTRAVENOUS at 12:14

## 2022-03-23 RX ADMIN — MIDAZOLAM 1 MG: 1 INJECTION INTRAMUSCULAR; INTRAVENOUS at 12:21

## 2022-03-23 RX ADMIN — MIDAZOLAM 1 MG: 1 INJECTION INTRAMUSCULAR; INTRAVENOUS at 12:14

## 2022-03-23 RX ADMIN — FENTANYL CITRATE 50 MCG: 0.05 INJECTION, SOLUTION INTRAMUSCULAR; INTRAVENOUS at 12:21

## 2022-03-23 NOTE — DISCHARGE INSTRUCTIONS
Understanding Colon and Rectal Polyps     The colon has a smooth lining composed of millions of cells.     The colon (also called the large intestine) is a muscular tube that forms the last part of the digestive tract. It absorbs water and stores food waste. The colon is about 4 to 6 feet long. The rectum is the last 6 inches of the colon. The colon and rectum have a smooth lining composed of millions of cells. Changes in these cells can lead to growths in the colon that can become cancerous and should be removed.     When the Colon Lining Changes  Changes that occur in the cells that line the colon or rectum can lead to growths called polyps. Over a period of years, polyps can turn cancerous. Removing polyps early may prevent cancer from ever forming.      Polyps  Polyps are fleshy clumps of tissue that form on the lining of the colon or rectum. Small polyps are usually benign (not cancerous). However, over time, cells in a polyp can change and become cancerous. The larger a polyp grows, the more likely this is to happen. Also, certain types of polyps known as adenomatous polyps are considered premalignant. This means that they will almost always become cancerous if they re not removed.          Cancer  Almost all colorectal cancers start when polyp cells begin growing abnormally. As a cancerous tumor grows, it may involve more and more of the colon or rectum. In time, cancer can also grow beyond the colon or rectum and spread to nearby organs or to glands called lymph nodes. The cells can also travel to other parts of the body. This is known as metastasis. The earlier a cancerous tumor is removed, the better the chance of preventing its spread.        [id number] [facility], [address] [place] [address], [place] [address]. All rights reserved. This information is not intended as a substitute for professional medical care. Always follow your healthcare professional's instructions.

## 2022-03-23 NOTE — LETTER
February 3, 2022      Mildred Tellez  [address]  [facility] [address]         Please be aware that coverage of these services is subject to the terms and limitations of your health insurance plan.  Call member services at your health plan with any benefit or coverage questions.    Thank you for choosing [facility]. You are scheduled for the following service(s):    Date:  Friday February 25, 2022             Procedure:  COLONOSCOPY  Doctor:        Dr Aguilar   Arrival Time:  1:15  *Enter and check in at the Main Hospital Entrance*  Procedure Time:  2p      Location:   [facility]        Endoscopy Department, First Floor         [address] [place], [place] [address]      [phone] or [phone] (August) to reschedule      MIRALAX -GATORADE  PREP  Colonoscopy is the most accurate test to detect colon polyps and colon cancer; and the only test where polyps can be removed. During this procedure, a doctor examines the lining of your large intestine and rectum through a flexible tube.   Transportation  You must arrange for a ride for the day of your procedure with a responsible adult. A taxi , Uber, etc, is not an option unless you are accompanied by a responsible adult. If you fail to arrange transportation with a responsible adult, your procedure will be cancelled and rescheduled.    Purchase the  following supplies at your local pharmacy:  - 2 (two) bisacodyl tablets: each tablet contains 5 mg.  (Dulcolax  laxative NOT Dulcolax  stool softener)   - 1 (one) 8.3 oz bottle of Polyethylene Glycol (PEG) 3350 Powder   (MiraLAX , Smooth LAX , ClearLAX  or equivalent)  - 64 oz Gatorade    Regular Gatorade, Gatorade G2 , Powerade , Powerade Zero  or Pedialyte  is acceptable. Red colored flavors are not allowed; all other colors (yellow, green, orange, purple and blue) are okay. It is also okay to buy two 2.12 oz packets of powdered Gatorade that  can be mixed with water to a total volume of 64 oz of liquid.  - 1 (one) 10 oz bottle of Magnesium Citrate (Red colored flavors are not allowed)  It is also okay for you to use a 0.5 oz package of powdered magnesium citrate (17 g) mixed with 10 oz of water.      PREPARATION FOR COLONOSCOPY    7 days before:    Discontinue fiber supplements and medications containing iron. This includes Metamucil  and Fibercon ; and multivitamins with iron.    3 days before:    Begin a low-fiber diet. A low-fiber diet helps making the cleanout more effective.     Examples of a low-fiber diet include (but are not limited to): white bread, white rice, pasta, crackers, fish, chicken, eggs, ground beef, creamy peanut butter, cooked/steamed/boiled vegetables, canned fruit, bananas, melons, milk, plain yogurt cheese, salad dressing and other condiments.     The following are not allowed on a low-fiber diet: seeds, nuts, popcorn, bran, whole wheat, corn, quinoa, raw fruits and vegetables, berries and dried fruit, beans and lentils.    For additional details on low-fiber diet, please refer to the table on the last page.    2 days before:    Continue the low-fiber diet.     Drink at least 8 glasses of water throughout the day.     Stop eating solid foods at 11:45 pm.    1 day before:    In the morning: begin a clear liquid diet (liquids you can see through).     Examples of a clear liquid diet include: water, clear broth or bouillon, Gatorade, Pedialyte or Powerade, carbonated and non-carbonated soft drinks (Sprite , 7-Up , ginger ale), strained fruit juices without pulp (apple, white grape, white cranberry), Jell-O  and popsicles.     The following are not allowed on a clear liquid diet: red liquids, alcoholic beverages, dairy products (milk, creamer, and yogurt), protein shakes, creamy broths, juice with pulp and chewing tobacco.    At noon: take 2 (two) bisacodyl tablets     At 4 (and no later than 6pm): start drinking the Miralax-Gatorade  preparation (8.3 oz of Miralax mixed with 64 oz of Gatorade in a large pitcher). Drink 1(one) 8 oz glass every 15 minutes thereafter, until the mixture is gone.    COLON CLEANSING TIPS: drink adequate amounts of fluids before and after your colon cleansing to prevent dehydration. Stay near a toilet because you will have diarrhea. Even if you are sitting on the toilet, continue to drink the cleansing solution every 15 minutes. If you feel nauseous or vomit, rinse your mouth with water, take a 15 to 30-minute-break and then continue drinking the solution. You will be uncomfortable until the stool has flushed from your colon (in about 2 to 4 hours). You may feel chilled.    Day of your procedure  You may take all of your morning medications including blood pressure medications, blood thinners (if you have not been instructed to stop these by our office), methadone, anti-seizure medications with sips of water 3 hours prior to your procedure or earlier. Do not take insulin or vitamins prior to your procedure. Continue the clear liquid diet.       4 hours prior: drink 10 oz of magnesium citrate. It may be easier to drink it with a straw.    STOP consuming all liquids after that.     Do not take anything by mouth during this time.     Allow extra time to travel to your procedure as you may need to stop and use a restroom along the way.    You are ready for the procedure, if you followed all instructions and your stool is no longer formed, but clear or yellow liquid. If you are unsure whether your colon is clean, please call our office at [phone] before you leave for your appointment.    Bring the following to your procedure:  - Insurance Card/Photo ID.   - List of current medications including over-the-counter medications and supplements.   - Your rescue inhaler if you currently use one to control asthma.    Canceling or rescheduling your appointment:   If you must cancel or reschedule your appointment, please call  [phone] as soon as possible.      COLONOSCOPY PRE-PROCEDURE CHECKLIST    If you have diabetes, ask your regular doctor for diet and medication restrictions.  If you take an anticoagulant or anti-platelet medication (such as Coumadin , Lovenox , Pradaxa , Xarelto , Eliquis , etc.), please call your primary doctor for advice on holding this medication.  If you take aspirin you may continue to do so.  If you are or may be pregnant, please discuss the risks and benefits of this procedure with your doctor.        What happens during a colonoscopy?    Plan to spend up to two hours, starting at registration time, at the endoscopy center the day of your procedure. The colonoscopy takes an average of 15 to 30 minutes. Recovery time is about 30 minutes.      Before the exam:    You will change into a gown.    Your medical history and medication list will be reviewed with you, unless that has been done over the phone prior to the procedure.     A nurse will insert an intravenous (IV) line into your hand or arm.    The doctor will meet with you and will give you a consent form to sign.  During the exam:     Medicine will be given through the IV line to help you relax.     Your heart rate and oxygen levels will be monitored. If your blood pressure is low, you may be given fluids through the IV line.     The doctor will insert a flexible hollow tube, called a colonoscope, into your rectum. The scope will be advanced slowly through the large intestine (colon).    You may have a feeling of fullness or pressure.     If an abnormal tissue or a polyp is found, the doctor may remove it through the endoscope for closer examination, or biopsy. Tissue removal is painless    After the exam:           Any tissue samples removed during the exam will be sent to a lab for evaluation. It may take 5-7 working days for you to be notified of the results.     A nurse will provide you with complete discharge instructions before you leave the  endoscopy center. Be sure to ask the nurse for specific instructions if you take blood thinners such as Aspirin, Coumadin or Plavix.     The doctor will prepare a full report for you and for the physician who referred you for the procedure.     Your doctor will talk with you about the initial results of your exam.      Medication given during the exam will prohibit you from driving for the rest of the day.     Following the exam, you may resume your normal diet. Your first meal should be light, no greasy foods. Avoid alcohol until the next day.     You may resume your regular activities the day after the procedure.         LOW-FIBER DIET    Foods RECOMMENDED Foods to AVOID   Breads, Cereal, Rice and Pasta:   White bread, rolls, biscuits, croissant and volodymyr toast.   Waffles, Eritrean toast and pancakes.   White rice, noodles, pasta, macaroni and peeled cooked potatoes.   Plain crackers and saltines.   Cooked cereals: farina, cream of rice.   Cold cereals: Puffed Rice , Rice Krispies , Corn Flakes  and Special K    Breads, Cereal, Rice and Pasta:   Breads or rolls with nuts, seeds or fruit.   Whole wheat, pumpernickel, rye breads and cornbread.   Potatoes with skin, brown or wild rice, and kasha (buckwheat).     Vegetables:   Tender cooked and canned vegetables without seeds: carrots, asparagus tips, green or wax beans, pumpkin, spinach, lima beans. Vegetables:   Raw or steamed vegetables.   Vegetables with seeds.   Sauerkraut.   Winter squash, peas, broccoli, Brussel sprouts, cabbage, onions, cauliflower, baked beans, peas and corn.   Fruits:   Strained fruit juice.   Canned fruit, except pineapple.   Ripe bananas and melon. Fruits:   Prunes and prune juice.   Raw fruits.   Dried fruits: figs, dates and raisins.   Milk/Dairy:   Milk: plain or flavored.   Yogurt, custard and ice cream.   Cheese and cottage cheese Milk/Dairy:     Meat and other proteins:   ground, well-cooked tender beef, lamb, ham, veal, pork, fish,  poultry and organ meats.   Eggs.   Peanut butter without nuts. Meat and other proteins:   Tough, fibrous meats with gristle.   Dry beans, peas and lentils.   Peanut butter with nuts.   Tofu.   Fats, Snack, Sweets, Condiments and Beverages:   Margarine, butter, oils, mayonnaise, sour cream and salad dressing, plain gravy.   Sugar, hard candy, clear jelly, honey and syrup.   Spices, cooked herbs, bouillon, broth and soups made with allowed vegetable, ketchup and mustard.   Coffee, tea and carbonated drinks.   Plain cakes, cookies and pretzels.   Gelatin, plain puddings, custard, ice cream, sherbet and popsicles. Fats, Snack, Sweets, Condiments and Beverages:   Nuts, seeds and coconut.   Jam, marmalade and preserves.   Pickles, olives, relish and horseradish.   All desserts containing nuts, seeds, dried fruit and coconut; or made from whole grains or bran.   Candy made with nuts or seeds.   Popcorn.         DIRECTIONS TO THE ENDOSCOPY DEPARTMENT    From the north ([facility])  Take [address] South, exit on [address] [address]. Get into the left hand vilma, turn left (east), go one-half mile to [address] and turn left. Go north to the second stoplight, take a right on [address] and follow it to the Main Hospital entrance.    From the south ([facility])  Take 35N to the 35E split and exit on [address] [address]. On [address] [address], turn left (west) to [address]. Turn right (north) on [address]. Go north to the second stoplight, take a right on [address] and follow it to the Main Hospital entrance.    From the east via 35E ([facility])  Take [address]E south to [address] [address] exit. Turn right on [address] [address]. Go west to [address]. Turn right (north) on [address]. Go to the second stoplight, take a right on [address] to the Main Hospital entrance.    From the east via [address] ([facility])  Take [address] to [address]. Turn left  (south) on [address] to [address], turn left (east) on [address] and follow it to the Main Hospital entrance.    From the west via [address] ([place], [place])  Take [address] east to [address]. Turn right (south) on [address] to [address], turn left (east) on [address] and follow it to the Main Hospital entrance.

## 2022-03-23 NOTE — LETTER
February 24, 2022      Mildred Tellez  [address]  [facility] [address]      Dear Mildred,       Please be aware that coverage of these services is subject to the terms and limitations of your health insurance plan.  Call member services at your health plan with any benefit or coverage questions.    Thank you for choosing [facility]. You are scheduled for the following service(s):    Date:  March 23, 2022             Procedure:  COLONOSCOPY  Doctor:        Jeff   Arrival Time:  11:15am *Enter and check in at the Main Hospital Entrance*  Procedure Time:  12:00pm      Location:   [facility]        Endoscopy Department, First Floor         [address] [place], [place] [address] [phone] or [phone] (August) to reschedule        MIRALAX -GATORADE  PREP  Colonoscopy is the most accurate test to detect colon polyps and colon cancer; and the only test where polyps can be removed. During this procedure, a doctor examines the lining of your large intestine and rectum through a flexible tube.   Transportation  You must arrange for a ride for the day of your procedure with a responsible adult. A taxi , Uber, etc, is not an option unless you are accompanied by a responsible adult. If you fail to arrange transportation with a responsible adult, your procedure will be cancelled and rescheduled.    Purchase the  following supplies at your local pharmacy:  - 2 (two) bisacodyl tablets: each tablet contains 5 mg.  (Dulcolax  laxative NOT Dulcolax  stool softener)   - 1 (one) 8.3 oz bottle of Polyethylene Glycol (PEG) 3350 Powder   (MiraLAX , Smooth LAX , ClearLAX  or equivalent)  - 64 oz Gatorade    Regular Gatorade, Gatorade G2 , Powerade , Powerade Zero  or Pedialyte  is acceptable. Red colored flavors are not allowed; all other colors (yellow, green, orange, purple and blue) are okay. It is also okay to buy two 2.12 oz packets of powdered  Gatorade that can be mixed with water to a total volume of 64 oz of liquid.  - 1 (one) 10 oz bottle of Magnesium Citrate (Red colored flavors are not allowed)  It is also okay for you to use a 0.5 oz package of powdered magnesium citrate (17 g) mixed with 10 oz of water.      PREPARATION FOR COLONOSCOPY    7 days before:    Discontinue fiber supplements and medications containing iron. This includes Metamucil  and Fibercon ; and multivitamins with iron.    3 days before:    Begin a low-fiber diet. A low-fiber diet helps making the cleanout more effective.     Examples of a low-fiber diet include (but are not limited to): white bread, white rice, pasta, crackers, fish, chicken, eggs, ground beef, creamy peanut butter, cooked/steamed/boiled vegetables, canned fruit, bananas, melons, milk, plain yogurt cheese, salad dressing and other condiments.     The following are not allowed on a low-fiber diet: seeds, nuts, popcorn, bran, whole wheat, corn, quinoa, raw fruits and vegetables, berries and dried fruit, beans and lentils.    For additional details on low-fiber diet, please refer to the table on the last page.    2 days before:    Continue the low-fiber diet.     Drink at least 8 glasses of water throughout the day.     Stop eating solid foods at 11:45 pm.    1 day before:    In the morning: begin a clear liquid diet (liquids you can see through).     Examples of a clear liquid diet include: water, clear broth or bouillon, Gatorade, Pedialyte or Powerade, carbonated and non-carbonated soft drinks (Sprite , 7-Up , ginger ale), strained fruit juices without pulp (apple, white grape, white cranberry), Jell-O  and popsicles.     The following are not allowed on a clear liquid diet: red liquids, alcoholic beverages, dairy products (milk, creamer, and yogurt), protein shakes, creamy broths, juice with pulp and chewing tobacco.    At noon: take 2 (two) bisacodyl tablets     At 4 (and no later than 6pm): start drinking the  Miralax-Gatorade preparation (8.3 oz of Miralax mixed with 64 oz of Gatorade in a large pitcher). Drink 1(one) 8 oz glass every 15 minutes thereafter, until the mixture is gone.    COLON CLEANSING TIPS: drink adequate amounts of fluids before and after your colon cleansing to prevent dehydration. Stay near a toilet because you will have diarrhea. Even if you are sitting on the toilet, continue to drink the cleansing solution every 15 minutes. If you feel nauseous or vomit, rinse your mouth with water, take a 15 to 30-minute-break and then continue drinking the solution. You will be uncomfortable until the stool has flushed from your colon (in about 2 to 4 hours). You may feel chilled.    Day of your procedure  You may take your morning medications including blood pressure medications, methadone, anti-seizure medications with sips of water 3 hours prior to your procedure or earlier. Do not take insulin, blood thinners (unless specifically told by your primary care provider) or vitamins prior to your procedure. Continue the clear liquid diet.       4 hours prior: drink 10 oz of magnesium citrate. It may be easier to drink it with a straw.    STOP consuming all liquids after that.     Do not take anything by mouth during this time.     Allow extra time to travel to your procedure as you may need to stop and use a restroom along the way.    You are ready for the procedure, if you followed all instructions and your stool is no longer formed, but clear or yellow liquid. If you are unsure whether your colon is clean, please call our office at [phone] before you leave for your appointment.    Bring the following to your procedure:  - Insurance Card/Photo ID.   - List of current medications including over-the-counter medications and supplements.   - Your rescue inhaler if you currently use one to control asthma.    Canceling or rescheduling your appointment:   If you must cancel or reschedule your appointment, please  call [phone] as soon as possible.      COLONOSCOPY PRE-PROCEDURE CHECKLIST    If you have diabetes, ask your regular doctor for diet and medication restrictions.  If you take an anticoagulant or anti-platelet medication (such as Coumadin , Lovenox , Pradaxa , Xarelto , Eliquis , etc.), please call your primary doctor for advice on holding this medication.  If you take aspirin you may continue to do so.  If you are or may be pregnant, please discuss the risks and benefits of this procedure with your doctor.        What happens during a colonoscopy?    Plan to spend up to two hours, starting at registration time, at the endoscopy center the day of your procedure. The colonoscopy takes an average of 15 to 30 minutes. Recovery time is about 30 minutes.      Before the exam:    You will change into a gown.    Your medical history and medication list will be reviewed with you, unless that has been done over the phone prior to the procedure.     A nurse will insert an intravenous (IV) line into your hand or arm.    The doctor will meet with you and will give you a consent form to sign.  During the exam:     Medicine will be given through the IV line to help you relax.     Your heart rate and oxygen levels will be monitored. If your blood pressure is low, you may be given fluids through the IV line.     The doctor will insert a flexible hollow tube, called a colonoscope, into your rectum. The scope will be advanced slowly through the large intestine (colon).    You may have a feeling of fullness or pressure.     If an abnormal tissue or a polyp is found, the doctor may remove it through the endoscope for closer examination, or biopsy. Tissue removal is painless    After the exam:           Any tissue samples removed during the exam will be sent to a lab for evaluation. It may take 5-7 working days for you to be notified of the results.     A nurse will provide you with complete discharge instructions before you leave  the endoscopy center. Be sure to ask the nurse for specific instructions if you take blood thinners such as Aspirin, Coumadin or Plavix.     The doctor will prepare a full report for you and for the physician who referred you for the procedure.     Your doctor will talk with you about the initial results of your exam.      Medication given during the exam will prohibit you from driving for the rest of the day.     Following the exam, you may resume your normal diet. Your first meal should be light, no greasy foods. Avoid alcohol until the next day.     You may resume your regular activities the day after the procedure.         LOW-FIBER DIET    Foods RECOMMENDED Foods to AVOID   Breads, Cereal, Rice and Pasta:   White bread, rolls, biscuits, croissant and volodymyr toast.   Waffles, Citizen of [place] toast and pancakes.   White rice, noodles, pasta, macaroni and peeled cooked potatoes.   Plain crackers and saltines.   Cooked cereals: farina, cream of rice.   Cold cereals: Puffed Rice , Rice Krispies , Corn Flakes  and Special K    Breads, Cereal, Rice and Pasta:   Breads or rolls with nuts, seeds or fruit.   Whole wheat, pumpernickel, rye breads and cornbread.   Potatoes with skin, brown or wild rice, and kasha (buckwheat).     Vegetables:   Tender cooked and canned vegetables without seeds: carrots, asparagus tips, green or wax beans, pumpkin, spinach, lima beans. Vegetables:   Raw or steamed vegetables.   Vegetables with seeds.   Sauerkraut.   Winter squash, peas, broccoli, Brussel sprouts, cabbage, onions, cauliflower, baked beans, peas and corn.   Fruits:   Strained fruit juice.   Canned fruit, except pineapple.   Ripe bananas and melon. Fruits:   Prunes and prune juice.   Raw fruits.   Dried fruits: figs, dates and raisins.   Milk/Dairy:   Milk: plain or flavored.   Yogurt, custard and ice cream.   Cheese and cottage cheese Milk/Dairy:     Meat and other proteins:   ground, well-cooked tender beef, lamb, ham, veal, pork,  fish, poultry and organ meats.   Eggs.   Peanut butter without nuts. Meat and other proteins:   Tough, fibrous meats with gristle.   Dry beans, peas and lentils.   Peanut butter with nuts.   Tofu.   Fats, Snack, Sweets, Condiments and Beverages:   Margarine, butter, oils, mayonnaise, sour cream and salad dressing, plain gravy.   Sugar, hard candy, clear jelly, honey and syrup.   Spices, cooked herbs, bouillon, broth and soups made with allowed vegetable, ketchup and mustard.   Coffee, tea and carbonated drinks.   Plain cakes, cookies and pretzels.   Gelatin, plain puddings, custard, ice cream, sherbet and popsicles. Fats, Snack, Sweets, Condiments and Beverages:   Nuts, seeds and coconut.   Jam, marmalade and preserves.   Pickles, olives, relish and horseradish.   All desserts containing nuts, seeds, dried fruit and coconut; or made from whole grains or bran.   Candy made with nuts or seeds.   Popcorn.         DIRECTIONS TO THE ENDOSCOPY DEPARTMENT    From the north ([facility])  Take [address] South, exit on [address] [address]. Get into the left hand vilma, turn left (east), go one-half mile to [address] and turn left. Go north to the second stoplight, take a right on [address] and follow it to the Main Hospital entrance.    From the south ([facility])  Take 35N to the [address]E split and exit on [address] [address]. On [address] [address], turn left (west) to [address]. Turn right (north) on [address]. Go north to the second stoplight, take a right on [address] and follow it to the Main Hospital entrance.    From the east via 35E ([facility])  Take [address]E south to [address] [address] exit. Turn right on [address] [address]. Go west to [address]. Turn right (north) on [address]. Go to the second stoplight, take a right on [address] to the Main Hospital entrance.    From the east via [address] ([facility])  Take [address] to [address]. Turn  left (south) on [address] to [address], turn left (east) on [address] and follow it to the Main Hospital entrance.    From the west via [address] ([place])  Take [address] east to [address]. Turn right (south) on [address] to [address], turn left (east) on [address] and follow it to the Main Hospital entrance.

## 2022-03-23 NOTE — H&P
Pre-Endoscopy History and Physical     Mildred Tellez MRN# [id number]   YOB: 1970 Age: 51 year old     Date of Procedure: 3/23/2022  Primary care provider: Mary Langston  Type of Endoscopy: Colonoscopy with possible biopsy, possible polypectomy  Reason for Procedure: screen  Type of Anesthesia Anticipated: Conscious Sedation    HPI:    Mildred is a 51 year old female who will be undergoing the above procedure.      A history and physical has been performed. The patient's medications and allergies have been reviewed. The risks and benefits of the procedure and the sedation options and risks were discussed with the patient.  All questions were answered and informed consent was obtained.      She denies a personal or family history of anesthesia complications or bleeding disorders.     Patient Active Problem List   Diagnosis     Hypothyroidism, unspecified type     Rosacea     Hyperlipidemia LDL goal <160     RLQ abdominal pain - when getting up from sitting in area of appy     Abnormal Pap smear, can't excl hi gd sq intraepithelial lesion (ASC-H)     Compound nevus of neck - with dysplastic features - right neck 11/2013     ASCUS pap -  7/2013 - saw Dr. Beltran ob/gyn [place]      History of dysplastic nevus     Cervicalgia     Gas bloat syndrome     Overweight (BMI 25.0-29.9)     Food sensitivity with gastrointestinal symptoms- plants in the nightshade family - tomatoes, potatoes, eggplant, tomatillos , peppers      Multiple pigmented nevi     Painful lumpy left breast- resolved         Past Medical History:   Diagnosis Date     Abnormal Pap smear, can't excl hi gd sq intraepithelial lesion (ASC-H) 7/2013    colp neg     Bleeding gastric ulcer 1997     from taking aspirin at age 27 - couldn't tell whether resolved      Coxsackie virus infection - severe - summer of 2016 - hands and feet totally peeled and oral lesions very painful 9/10/2016     Hypothyroidism     after first pregnancy       Mastodynia- left breast- resolved  10/4/2013     Papanicolaou smear of cervix with atypical squamous cells of undetermined significance (ASC-US) 2015     neg HPV, needs colp     Post op infection     s/p her appy as a child      Postpartum septic endometritis     fr.  prolonged rupture     RLQ abdominal pain     when getting up from sitting in area of appy        Past Surgical History:   Procedure Laterality Date     ZZC APPENDECTOMY  1984       Social History     Tobacco Use     Smoking status: Never Smoker     Smokeless tobacco: Never Used   Substance Use Topics     Alcohol use: Never     Alcohol/week: 0.0 standard drinks       Family History   Problem Relation Age of Onset     Hypertension Mother      Glaucoma Mother      Hypertension Maternal Grandmother      Diabetes Paternal Grandmother          of complications fr. diabetes     C.A.D. Father         on blood thinners for his PAD     Cardiovascular Father         peripheral vascular disease- very heavy smoker      Lung Cancer Father      Breast Cancer Other         paternal great aunt     Colon Cancer No family hx of        Prior to Admission medications    Medication Sig Start Date End Date Taking? Authorizing Provider   augmented betamethasone dipropionate (DIPROLENE-AF) 0.05 % external cream Apply topically 2 times daily 22  Yes Katie Sewell MD   ketoconazole (NIZORAL) 2 % external cream Apply topically 2 times daily To affected areas of the groin, axilla and patches on the lower legs. 22  Yes Jeannette Babb PA-C   levothyroxine (SYNTHROID/LEVOTHROID) 137 MCG tablet TAKE 1 TABLET BY MOUTH DAILY 22  Yes Mary Langston MD   triamcinolone (KENALOG) 0.025 % cream Apply topically 2 times daily To any areas of rashes (axilla, groin leg) until clear, typically 2 weeks. 22  Yes Jeannette Babb PA-C   triamcinolone (KENALOG) 0.025 % external ointment Apply topically 2 times daily To affected areas of rashes  "until clear. 3/3/22  Yes Jeannette Babb PA-C   amoxicillin-clavulanate (AUGMENTIN) 875-125 MG tablet Take 1 tablet by mouth 2 times daily 2/19/22   Katie Sewell MD       No Known Allergies     REVIEW OF SYSTEMS:   5 point ROS negative except as noted above in HPI, including Gen., Resp., CV, GI &  system review.    PHYSICAL EXAM:   There were no vitals taken for this visit. Estimated body mass index is 25.69 kg/m  as calculated from the following:    Height as of 1/6/22: 1.6 m (5' 3\").    Weight as of 2/19/22: 65.8 kg (145 lb).   GENERAL APPEARANCE: alert, and oriented  MENTAL STATUS: alert  AIRWAY EXAM: Mallampatti Class I (visualization of the soft palate, fauces, uvula, anterior and posterior pillars)  RESP: lungs clear to auscultation - no rales, rhonchi or wheezes  CV: regular rates and rhythm  DIAGNOSTICS:    Not indicated    IMPRESSION   ASA Class 2 - Mild systemic disease    PLAN:   Plan for Colonoscopy with possible biopsy, possible polypectomy. We discussed the risks, benefits and alternatives and the patient wished to proceed.    The above has been forwarded to the consulting provider.      Signed Electronically by: Eric Aguilar MD  March 23, 2022          "

## 2022-03-24 LAB
PATH REPORT.COMMENTS IMP SPEC: NORMAL
PATH REPORT.COMMENTS IMP SPEC: NORMAL
PATH REPORT.FINAL DX SPEC: NORMAL
PATH REPORT.GROSS SPEC: NORMAL
PATH REPORT.MICROSCOPIC SPEC OTHER STN: NORMAL
PATH REPORT.RELEVANT HX SPEC: NORMAL
PHOTO IMAGE: NORMAL

## 2022-03-28 NOTE — PROGRESS NOTES
[facility] Dermatology Note  Encounter Date: Mar 29, 2022  {kkvisit3:966211}    Dermatology Problem List:  1. Dermatitis  - TMC 0.025% cream  - s/p punch bx 2/22/22  ____________________________________________    Assessment & Plan:    # {Diagnosesderm:623940}.   {kkplans:653479}   - ***     # {Diagnosesderm:689593}.   {kkplans:103842}   - ***     Procedures Performed:   {kkprocedurenotes:544238}  {kkprocedurenotes:454896}    Follow-up: {kkfollowup:469836}    Staff and Scribe:     Scribe Disclosure:  Carmen MARTIN, am serving as a scribe to prep the notes for Jeannette Babb PA-C .    ***  ____________________________________________    CC: No chief complaint on file.    HPI:  Ms. Mildred Tellez is a(n) 51 year old female who presents today {kknew/return:312000} for ***. Last seen by myself on 2/22/22 at which point two punch biopsies of the right calf and right upper thigh returned as subacute spongiotic dermatitis.     Patient is otherwise feeling well, without additional skin concerns.    Labs Reviewed:  ***N/A    Physical Exam:  Vitals: There were no vitals taken for this visit.  SKIN: {kkSkinExam:854074}  - ***  - {Skin Exam Derm:867879}.   - {Skin Exam Derm:344050}.   - {Skin Exam Derm:055154}.   - No other lesions of concern on areas examined.     Medications:  Current Outpatient Medications   Medication     amoxicillin-clavulanate (AUGMENTIN) 875-125 MG tablet     augmented betamethasone dipropionate (DIPROLENE-AF) 0.05 % external cream     ketoconazole (NIZORAL) 2 % external cream     levothyroxine (SYNTHROID/LEVOTHROID) 137 MCG tablet     triamcinolone (KENALOG) 0.025 % cream     triamcinolone (KENALOG) 0.025 % external ointment     No current facility-administered medications for this visit.      Past Medical History:   Patient Active Problem List   Diagnosis     Hypothyroidism, unspecified type     Rosacea     Hyperlipidemia LDL goal <160     RLQ abdominal pain - when  getting up from sitting in area of appy     Abnormal Pap smear, can't excl hi gd sq intraepithelial lesion (ASC-H)     Compound nevus of neck - with dysplastic features - right neck 11/2013     ASCUS pap -  7/2013 - saw Dr. Beltran ob/gyn [place]      History of dysplastic nevus     Cervicalgia     Gas bloat syndrome     Overweight (BMI 25.0-29.9)     Food sensitivity with gastrointestinal symptoms- plants in the nightshade family - tomatoes, potatoes, eggplant, tomatillos , peppers      Multiple pigmented nevi     Painful lumpy left breast- resolved      Past Medical History:   Diagnosis Date     Abnormal Pap smear, can't excl hi gd sq intraepithelial lesion (ASC-H) 7/2013    colp neg     Bleeding gastric ulcer 1997     from taking aspirin at age 27 - couldn't tell whether resolved      Coxsackie virus infection - severe - summer of 2016 - hands and feet totally peeled and oral lesions very painful 9/10/2016     Hypothyroidism     after first pregnancy      Mastodynia- left breast- resolved  10/4/2013     Papanicolaou smear of cervix with atypical squamous cells of undetermined significance (ASC-US) 7/2015     neg HPV, needs colp     Post op infection     s/p her appy as a child      Postpartum septic endometritis     fr.  prolonged rupture     RLQ abdominal pain     when getting up from sitting in area of appy        CC Mary Langston MD  [address] [place] [address] on close of this encounter.

## 2022-03-29 ENCOUNTER — OFFICE VISIT (OUTPATIENT)
Dept: FAMILY MEDICINE | Facility: CLINIC | Age: 52
End: 2022-03-29
Attending: FAMILY MEDICINE
Payer: COMMERCIAL

## 2022-03-29 ENCOUNTER — LAB (OUTPATIENT)
Dept: LAB | Facility: CLINIC | Age: 52
End: 2022-03-29

## 2022-03-29 ENCOUNTER — ANCILLARY PROCEDURE (OUTPATIENT)
Dept: MAMMOGRAPHY | Facility: CLINIC | Age: 52
End: 2022-03-29
Attending: FAMILY MEDICINE
Payer: COMMERCIAL

## 2022-03-29 VITALS — SYSTOLIC BLOOD PRESSURE: 102 MMHG | DIASTOLIC BLOOD PRESSURE: 70 MMHG

## 2022-03-29 DIAGNOSIS — L30.8: ICD-10-CM

## 2022-03-29 DIAGNOSIS — L81.4 SOLAR LENTIGINOSIS: ICD-10-CM

## 2022-03-29 DIAGNOSIS — L85.3 XEROSIS CUTIS: ICD-10-CM

## 2022-03-29 DIAGNOSIS — D22.9 MULTIPLE PIGMENTED NEVI: ICD-10-CM

## 2022-03-29 DIAGNOSIS — Z12.83 SKIN CANCER SCREENING: Primary | ICD-10-CM

## 2022-03-29 DIAGNOSIS — L82.1 SEBORRHEIC KERATOSES: ICD-10-CM

## 2022-03-29 DIAGNOSIS — Z12.31 VISIT FOR SCREENING MAMMOGRAM: ICD-10-CM

## 2022-03-29 DIAGNOSIS — E03.9 HYPOTHYROIDISM, UNSPECIFIED TYPE: ICD-10-CM

## 2022-03-29 PROCEDURE — 36415 COLL VENOUS BLD VENIPUNCTURE: CPT

## 2022-03-29 PROCEDURE — 84439 ASSAY OF FREE THYROXINE: CPT

## 2022-03-29 PROCEDURE — 99213 OFFICE O/P EST LOW 20 MIN: CPT | Performed by: PHYSICIAN ASSISTANT

## 2022-03-29 PROCEDURE — 77063 BREAST TOMOSYNTHESIS BI: CPT | Mod: TC | Performed by: RADIOLOGY

## 2022-03-29 PROCEDURE — 84443 ASSAY THYROID STIM HORMONE: CPT

## 2022-03-29 PROCEDURE — 77067 SCR MAMMO BI INCL CAD: CPT | Mod: TC | Performed by: RADIOLOGY

## 2022-03-29 PROCEDURE — 84480 ASSAY TRIIODOTHYRONINE (T3): CPT

## 2022-03-29 NOTE — LETTER
3/29/2022         RE: Mildred Tellez  [address]  [facility] [address]        Dear Colleague,    Thank you for referring your patient, Mildred Tellez, to the [facility]. Please see a copy of my visit note below.    [facility] Dermatology Note  Encounter Date: Mar 29, 2022  Office Visit     Dermatology Problem List:  1.  Subacute dermatitis, status post biopsy 2/22/2022  -Triamcinolone 0.025% ointment twice daily  -Status post triamcinolone 002 5% cream twice daily, ketoconazole cream, Augmentin  2.  Skin cancer screening 3/29/2022  ____________________________________________    Assessment & Plan:    # Eczematous dermatitis, thighs  Continue triamcinolone 0.025% ointment twice daily to affected areas until resolved.  Restart moisturizing your skin twice daily.     # Xerosis,  -Encouraged minimal soap use and moisturizing immediately after the shower.    # Multiple pigmented nevi and solar lentigenes.   -ABCDs of melanoma were discussed and self skin checks were advised.   Sun precaution was advised including the use of sun screens of SPF 30 or higher, sun protective clothing, and avoidance of tanning beds.          Procedures Performed:   - None    Follow-up: in 2 months or sooner as needed.     Staff:    All risks, benefits and alternatives were discussed with patient.  Patient is in agreement and understands the assessment and plan.  All questions were answered.  Sun Screen Education was given.   Return to Clinic in 2 month or sooner as needed.   Jeannette Babb PA-C   [facility]   ____________________________________________    CC: Skin Check (follow up on rash)    HPI:  Ms. Mildred Tellez is a(n) 51 year old female who presents today as a return patient for a full skin check and follow-up of a rash.  She was last seen here 2/22/2022 when she had punch biopsies in her right calf and upper thigh.  These returned as  subacute dermatitis.  Urgently she had been prescribed Augmentin and ketoconazole cream through her primary care provider.  I switch this to triamcinolone 0.025% cream and eventually switch this to the ointment version.  She has noticed significant improvement and thought the rashes had gone away for quite a while.      She states she has been feeling well during this time except for a runny nose.  She has been having some thyroid issues and following closely with her provider.    No family history of psoriasis or eczema. Patient is otherwise feeling well, without additional skin concerns.    Labs Reviewed:  N/A    Physical Exam:  Vitals: /70   SKIN: Full skin, which includes the head/face, both arms, chest, back, abdomen,both legs, genitalia and/or groin buttocks, digits and/or nails, was examined.  - Multiple regular brown pigmented macules and papules are identified on the face, trunk and extremities.   Scattered brown macules on sun exposed areas.  There are waxy stuck on tan to brown papules on the face, trunk and extremities.  There is xerosis of the extremities, mainly on the thighs.  Faint ill-defined pink scaly papules, thighs.  Remaining rashes are clear including axilla..   - No other lesions of concern on areas examined.                       Medications:  Current Outpatient Medications   Medication     levothyroxine (SYNTHROID/LEVOTHROID) 137 MCG tablet     triamcinolone (KENALOG) 0.025 % cream     amoxicillin-clavulanate (AUGMENTIN) 875-125 MG tablet     augmented betamethasone dipropionate (DIPROLENE-AF) 0.05 % external cream     ketoconazole (NIZORAL) 2 % external cream     triamcinolone (KENALOG) 0.025 % external ointment     No current facility-administered medications for this visit.      Past Medical History:   Patient Active Problem List   Diagnosis     Hypothyroidism, unspecified type     Rosacea     Hyperlipidemia LDL goal <160     RLQ abdominal pain - when getting up from sitting in  area of appy     Abnormal Pap smear, can't excl hi gd sq intraepithelial lesion (ASC-H)     Compound nevus of neck - with dysplastic features - right neck 11/2013     ASCUS pap -  7/2013 - saw Dr. Beltran ob/gyn [place]      History of dysplastic nevus     Cervicalgia     Gas bloat syndrome     Overweight (BMI 25.0-29.9)     Food sensitivity with gastrointestinal symptoms- plants in the nightshade family - tomatoes, potatoes, eggplant, tomatillos , peppers      Multiple pigmented nevi     Painful lumpy left breast- resolved      Past Medical History:   Diagnosis Date     Abnormal Pap smear, can't excl hi gd sq intraepithelial lesion (ASC-H) 7/2013    colp neg     Bleeding gastric ulcer 1997     from taking aspirin at age 27 - couldn't tell whether resolved      Coxsackie virus infection - severe - summer of 2016 - hands and feet totally peeled and oral lesions very painful 9/10/2016     Hypothyroidism     after first pregnancy      Mastodynia- left breast- resolved  10/4/2013     Papanicolaou smear of cervix with atypical squamous cells of undetermined significance (ASC-US) 7/2015     neg HPV, needs colp     Post op infection     s/p her appy as a child      Postpartum septic endometritis     fr.  prolonged rupture     RLQ abdominal pain     when getting up from sitting in area of appy        CC No referring provider defined for this encounter. on close of this encounter.      Again, thank you for allowing me to participate in the care of your patient.        Sincerely,        Jeannette Babb PA-C

## 2022-03-29 NOTE — PROGRESS NOTES
[facility] Dermatology Note  Encounter Date: Mar 29, 2022  Office Visit     Dermatology Problem List:  1.  Subacute dermatitis, status post biopsy 2/22/2022  -Triamcinolone 0.025% ointment twice daily  -Status post triamcinolone 002 5% cream twice daily, ketoconazole cream, Augmentin  2.  Skin cancer screening 3/29/2022  ____________________________________________    Assessment & Plan:    # Eczematous dermatitis, thighs  Continue triamcinolone 0.025% ointment twice daily to affected areas until resolved.  Restart moisturizing your skin twice daily.     # Xerosis,  -Encouraged minimal soap use and moisturizing immediately after the shower.    # Multiple pigmented nevi and solar lentigenes.   -ABCDs of melanoma were discussed and self skin checks were advised.   Sun precaution was advised including the use of sun screens of SPF 30 or higher, sun protective clothing, and avoidance of tanning beds.          Procedures Performed:   - None    Follow-up: in 2 months or sooner as needed.     Staff:    All risks, benefits and alternatives were discussed with patient.  Patient is in agreement and understands the assessment and plan.  All questions were answered.  Sun Screen Education was given.   Return to Clinic in 2 month or sooner as needed.   Jeannette Babb PA-C   [facility]   ____________________________________________    CC: Skin Check (follow up on rash)    HPI:  Ms. Mildred Tellez is a(n) 51 year old female who presents today as a return patient for a full skin check and follow-up of a rash.  She was last seen here 2/22/2022 when she had punch biopsies in her right calf and upper thigh.  These returned as subacute dermatitis.  Urgently she had been prescribed Augmentin and ketoconazole cream through her primary care provider.  I switch this to triamcinolone 0.025% cream and eventually switch this to the ointment version.  She has noticed significant improvement  and thought the rashes had gone away for quite a while.      She states she has been feeling well during this time except for a runny nose.  She has been having some thyroid issues and following closely with her provider.    No family history of psoriasis or eczema. Patient is otherwise feeling well, without additional skin concerns.    Labs Reviewed:  N/A    Physical Exam:  Vitals: /70   SKIN: Full skin, which includes the head/face, both arms, chest, back, abdomen,both legs, genitalia and/or groin buttocks, digits and/or nails, was examined.  - Multiple regular brown pigmented macules and papules are identified on the face, trunk and extremities.   Scattered brown macules on sun exposed areas.  There are waxy stuck on tan to brown papules on the face, trunk and extremities.  There is xerosis of the extremities, mainly on the thighs.  Faint ill-defined pink scaly papules, thighs.  Remaining rashes are clear including axilla..   - No other lesions of concern on areas examined.                       Medications:  Current Outpatient Medications   Medication     levothyroxine (SYNTHROID/LEVOTHROID) 137 MCG tablet     triamcinolone (KENALOG) 0.025 % cream     amoxicillin-clavulanate (AUGMENTIN) 875-125 MG tablet     augmented betamethasone dipropionate (DIPROLENE-AF) 0.05 % external cream     ketoconazole (NIZORAL) 2 % external cream     triamcinolone (KENALOG) 0.025 % external ointment     No current facility-administered medications for this visit.      Past Medical History:   Patient Active Problem List   Diagnosis     Hypothyroidism, unspecified type     Rosacea     Hyperlipidemia LDL goal <160     RLQ abdominal pain - when getting up from sitting in area of appy     Abnormal Pap smear, can't excl hi gd sq intraepithelial lesion (ASC-H)     Compound nevus of neck - with dysplastic features - right neck 11/2013     ASCUS pap -  7/2013 - saw Dr. Beltran ob/gyn [place]      History of dysplastic nevus      Cervicalgia     Gas bloat syndrome     Overweight (BMI 25.0-29.9)     Food sensitivity with gastrointestinal symptoms- plants in the nightshade family - tomatoes, potatoes, eggplant, tomatillos , peppers      Multiple pigmented nevi     Painful lumpy left breast- resolved      Past Medical History:   Diagnosis Date     Abnormal Pap smear, can't excl hi gd sq intraepithelial lesion (ASC-H) 7/2013    colp neg     Bleeding gastric ulcer 1997     from taking aspirin at age 27 - couldn't tell whether resolved      Coxsackie virus infection - severe - summer of 2016 - hands and feet totally peeled and oral lesions very painful 9/10/2016     Hypothyroidism     after first pregnancy      Mastodynia- left breast- resolved  10/4/2013     Papanicolaou smear of cervix with atypical squamous cells of undetermined significance (ASC-US) 7/2015     neg HPV, needs colp     Post op infection     s/p her appy as a child      Postpartum septic endometritis     fr.  prolonged rupture     RLQ abdominal pain     when getting up from sitting in area of appy        CC No referring provider defined for this encounter. on close of this encounter.

## 2022-03-29 NOTE — PATIENT INSTRUCTIONS
Pediatric Dermatology  [facility]  ?[address] [place] [id number]  [phone]    ATOPIC DERMATITIS  WHAT IS ATOPIC DERMATITIS?  Atopic dermatitis (also called Eczema) is a condition of the skin where the skin is dry, red, and itchy. The main function of the skin is to provide a barrier from the environment and is also the first defense of the immune system.    In atopic dermatitis the skin barrier is decreased, and the skin is easily irritated. Also, the skin s immune system is different. If there are increased allergic type cells in the skin, the skin may become red and  hyper-excitable.  This leads to itching and a subsequent rash.    WHY DO PEOPLE GET ATOPIC DERMATITIS?  There is no single answer because many factors are involved. It is likely a combination of genetic makeup and environmental triggers and /or exposures; Excessive drying or sweating of the skin, irritating soaps, dust mites, and pet dander area some of the more common triggers. There are no blood tests that can be done to confirm this diagnosis. This history and appearance of the skin is usually sufficient for a diagnosis. However, in some cases if the rash does not fit with the history or respond appropriately to treatment, a skin biopsy may be helpful. Many children do outgrow atopic dermatitis or get better; however, many continue to have sensitive skin into adulthood.    Asthma and hay fever area seen in many patients with atopic dermatitis; however, asthma flares do not necessarily occur at the same time as skin flare ups.     PREVENTING FLARES OF ATOPIC DERMATITIS  The first step is to maintain the skin s barrier function. Keep the skin well moisturized. Avoid irritants and triggers. Use prescription medicine when there are red or rough areas to help the skin to return to normal as quickly as possible. Try to limit scratching.    IF EVERYTHING IS BEING DONE AS IT SHOULD, WHY DOES THE RASH KEEP FLARING?  If  you keep the skin well moisturized, and avoid coming in contact with things you know irritate your child s skin, there will be less flares. However, some flares of atopic dermatitis are beyond your control. You should work with your physician to come up with a plan that minimizes flares while minimizing long term use of medications that suppress the immune system.    WHAT ARE THE TRIGGERS?    Triggers are different for different people. The most common triggers are:    Heat and sweat for some individuals and cold weather for others    House dust mites, pet fur    Wool; synthetic fabrics like nylon; dyed fabrics    Tobacco smoke    Fragrance in; shampoos, soaps, lotions, laundry detergents, fabric softeners    Saliva or prolonged exposure to water    WHAT ABOUT FOOD ALLERGIES?  This is a very controversial topic; as many believe that food allergies are responsible for skin flares. In some cases, specific foods may cause worsening of atopic dermatitis. However, this occurs in a minority of cases and usually happens within a few hours of ingestion. While food allergy is more common in children with eczema, foods are specific triggers for flares in only a small percentage of children. If you notice that the skin flares after certain food, you can see if eliminating one food at a time makes a difference, as long as your child can still enjoy a well-balanced diet.    There are blood (RAST) and skin (PRICK) tests that can check for allergies, but they are often positive in children who are not truly allergic. Therefore, it is important that you work with your allergist and dermatologist to determine which foods are relevant and causing true symptoms. Extreme food elimination diets without the guidance of your doctor, which have become more popular in recent years, may even results in worsening of the skin rash due to malnutrition and avoidance of essential nutrients.    TREATMENT:   Treatments are aimed at minimizing  exposure to irritating factors and decreasing the skin inflammation which results in an itchy rash.    There are many different treatment options, which depend on your child s rash, its location and severity. Topical treatments include corticosteroids and steroid-like creams such as Protopic and Elidel which do not thin the skin. Please read the discussions below regarding risks and benefits of all these creams.    Occasionally bacterial or viral infections can occur which flare the skin and require oral and/or topical antibiotics or antiviral. In some cases bleach baths 2-3 times weekly can be helpful to prevent recurrent infection.    For severe disease, strong oral medications such as methotrexate or azathioprine (Imuran) may be needed. There medications require close monitoring and follow-up. You should discuss the risks/benefits/alternatives or these medications with your dermatologist to come up with the best treatment plan for your child.    Further Information:  There is much more information available from the [facility] website: [email or web address].org     Gentle Skin Care  Below is a list of products our providers recommend for gentle skin care.  Moisturizers:    Lighter; Cetaphil Cream, CeraVe, Aveeno and Vanicream Light     Thicker; Aquaphor Ointment, Vaseline, Petrolium Jelly, Eucerin and Vanicream    Avoid Lotions (too thin)  Mild Cleansers:    Dove- Fragrance Free    CeraVe     Vanicream Cleansing Bar    Cetaphil Cleanser     Aquaphor 2 in1 Gentle Wash and Shampoo       Laundry Products:    All Free and Clear    Cheer Free    Generic Brands are okay as long as they are  Fragrance Free      Avoid fabric softeners  and dryer sheets   Sunscreens: SPF 30 or greater     Sunscreens that contain Zinc Oxide or Titanium Dioxide should be applied, these are physical blockers. Spray or  chemical  sunscreens should be avoided.        Shampoo and Conditioners:    Free and Clear by  "Vanicream    Aquaphor 2 in 1 Gentle Wash and Shampoo    California Baby  super sensitive   Oils:    Mineral Oil     Emu Oil     For some patients, coconut and sunflower seed oil      Generic Products are an okay substitute, but make sure they are fragrance free.  *Avoid product that have fragrance added to them. Organic does not mean  fragrance free.  In fact patients with sensitive skin can become quite irritated by organic products.     1. Daily bathing is recommended. Make sure you are applying a good moisturizer after bathing every time.  2. Use Moisturizing creams at least twice daily to the whole body. Your provider may recommend a lighter or heavier moisturizer based on your child s severity and that time of year it is.  3. Creams are more moisturizing than lotions  4. Products should be fragrance free- soaps, creams, detergents.  Products such as Devin and Devin as well as the Cetaphil \"Baby\" line contain fragrance and may irritate your child's sensitive skin.    Care Plan:  1. Keep bathing and showering short, less than 15 minutes   2. Always use lukewarm warm when possible. AVOID very HOT or COLD water  3. DO NOT use bubble bath  4. Limit the use of soaps. Focus on the skin folds, face, armpits, groin and feet  5. Do NOT vigorously scrub when you cleanse your skin  6. After bathing, PAT your skin lightly with a towel. DO NOT rub or scrub when drying  7. ALWAYS apply a moisturizer immediately after bathing. This helps to  lock in  the moisture. * IF YOU WERE PRESCRIBED A TOPICAL MEDICATION, APPLY YOUR MEDICATION FIRST THEN COVER WITH YOUR DAILY MOISTURIZER  8. Reapply moisturizing agents at least twice daily to your whole body  9. Do not use products such as powders, perfumes, or colognes on your skin  10. Avoid saunas and steam baths. This temperature is too HOT  11. Avoid tight or  scratchy  clothing such as wool  12. Always wash new clothing before wearing them for the first time  13. Sometimes a " humidifier or vaporizer can be used at night can help the dry skin. Remember to keep it clean to avoid mold growth.

## 2022-03-30 LAB — T3 SERPL-MCNC: 97 NG/DL (ref 60–181)

## 2022-03-31 LAB
T4 FREE SERPL-MCNC: 1.46 NG/DL (ref 0.76–1.46)
TSH SERPL DL<=0.005 MIU/L-ACNC: 0.8 MU/L (ref 0.4–4)

## 2022-03-31 NOTE — RESULT ENCOUNTER NOTE
Your mammogram was normal.     Thank you so much for choosing [facility].  Please contact us with any questions that you may have.   We appreciate the opportunity to serve you now and look forward to supporting your healthcare needs for a long time to come!    Most Sincerely,     Mary Langston MD

## 2022-04-08 ENCOUNTER — TELEPHONE (OUTPATIENT)
Dept: NEUROSURGERY | Facility: CLINIC | Age: 52
End: 2022-04-08
Payer: COMMERCIAL

## 2022-04-08 NOTE — TELEPHONE ENCOUNTER
Spoke to patient regarding the hospital based fee which is charged to all patients seen at [facility], because we are connected to the hospital.  I explained that we have signs posted in every room, which is our obligation and it is not the obligation of the staff to inform patients of that information.  Patient verbalized that she understood.

## 2022-04-26 DIAGNOSIS — E03.9 HYPOTHYROIDISM, UNSPECIFIED TYPE: ICD-10-CM

## 2022-04-26 RX ORDER — LEVOTHYROXINE SODIUM 137 UG/1
TABLET ORAL
Qty: 90 TABLET | Refills: 2 | Status: SHIPPED | OUTPATIENT
Start: 2022-04-26 | End: 2022-12-21

## 2022-04-26 NOTE — TELEPHONE ENCOUNTER
Prescription approved per [facility] Refill Protocol.  Connor DESOUZA RN, BSN

## 2022-04-28 ENCOUNTER — LAB REQUISITION (OUTPATIENT)
Dept: LAB | Facility: CLINIC | Age: 52
End: 2022-04-28

## 2022-04-28 PROCEDURE — U0005 INFEC AGEN DETEC AMPLI PROBE: HCPCS | Performed by: INTERNAL MEDICINE

## 2022-04-29 LAB — SARS-COV-2 RNA RESP QL NAA+PROBE: POSITIVE

## 2022-08-29 NOTE — PROGRESS NOTES
[facility] Dermatology Note  Encounter Date: Aug 30, 2022  Office Visit     Dermatology Problem List:  # Eczematous dermatitis - s/p biopsy 2/22/2022 with subacute spongiotic dermatitis with eosinophils, concern for ACD  -Triamcinolone 0.025% ointment twice daily prn  -Patch testing referral placed 8/30/22  # Skin cancer screening 3/29/2022  ____________________________________________    Assessment & Plan:    # Eczematous dermatitis, chronic, active.  Concern for ACD.  - continue triamcinolone 0.025% ointment BID prn  - gentle moisturizer twice daily  - sensitive skin products were recommended   - referral to patch testing     # Xerosis  -Encouraged minimal soap use and moisturizing immediately after the shower.    Procedures Performed:   None    Follow-up: 9 month(s) in-person (after patch testing), or earlier for new or changing lesions    Staff and Resident:     Magalis Thomas MD  PGY2 Dermatology Resident    Staff Physician Comments:   I saw and evaluated the patient with the resident and I agree with the assessment and plan.  I was present for the examination.    Suri Ambrose MD    Department of Dermatology  [facility]: Phone: [phone], Fax: [phone]  9/1/2022     ____________________________________________    CC: Derm Problem (Eczema - improved with Triamcinolone. )    HPI:  Ms. Mildred Tellez is a(n) 51 year old female who presents today as a return patient for eczema follow up. Last seen by Jeannette Babb PA-C on 3/29/22, at which time patient had no lesions of concern.    Mildred states that she has been experiencing intermittent, pruritic dermatitis since January while she was in [place]. Her rash initially started on the right calf, and spread to involve the buttocks, groin, axilla, and the anterior thighs as well. The skin became very dry as a result. She has tried treating  the area with olive oil, various lotions, triamcinolone 0.025% cream twice daily, ketoconazole cream, and a course of Augmentin. Over the past month, she has experienced the rash two of the four weeks, which she says is as infrequent as it has been since its onset. Reported bathing more infrequently. She was camping when the skin was clear. When present, Mildred reports experiencing some associated fatigue related to the rash. She has currently been treating the rash with triamcinolone 0.025% cream as needed for flares approximately once weekly, and states that this resolves the rash within a day.      She reports recently being diagnosed with hypothyroidism in December 2021, and her rash symptoms started in January. Patient denies having a history of rashes as a child, seasonal allergies, or asthma. She denies ever undergoing allergy or patch testing previously. Patient is otherwise feeling well, without additional skin concerns.    Mildred uses Cetaphil body wash with infrequent use of Vanicream moisturizer. She has not been using deodorant routinely, though will use natural deodorants when going to a conference. She reports shaving weekly with one of many bar soaps (All Spice, Dove, Olay, Farideh Spring).    Labs Reviewed:  N/A    Physical Exam:  Vitals: There were no vitals taken for this visit.  SKIN: Focused examination of bilateral axillae, bilateral groin, legs, and buttocks was performed.  - Diffuse xerosis on bilateral lower legs  - There are numerous pink papules present on the L side of the groin, scattered along the bikini line.  - No other lesions of concern on areas examined.     Medications:  Current Outpatient Medications   Medication     levothyroxine (SYNTHROID/LEVOTHROID) 137 MCG tablet     No current facility-administered medications for this visit.      Past Medical History:   Patient Active Problem List   Diagnosis     Hypothyroidism, unspecified type     Rosacea     Hyperlipidemia LDL goal <160      RLQ abdominal pain - when getting up from sitting in area of appy     Abnormal Pap smear, can't excl hi gd sq intraepithelial lesion (ASC-H)     Compound nevus of neck - with dysplastic features - right neck 11/2013     ASCUS pap -  7/2013 - saw Dr. Beltran ob/gyn [place]      History of dysplastic nevus     Cervicalgia     Gas bloat syndrome     Overweight (BMI 25.0-29.9)     Food sensitivity with gastrointestinal symptoms- plants in the nightshade family - tomatoes, potatoes, eggplant, tomatillos , peppers      Multiple pigmented nevi     Painful lumpy left breast- resolved      Past Medical History:   Diagnosis Date     Abnormal Pap smear, can't excl hi gd sq intraepithelial lesion (ASC-H) 7/2013    colp neg     Bleeding gastric ulcer 1997     from taking aspirin at age 27 - couldn't tell whether resolved      Coxsackie virus infection - severe - summer of 2016 - hands and feet totally peeled and oral lesions very painful 9/10/2016     Hypothyroidism     after first pregnancy      Mastodynia- left breast- resolved  10/4/2013     Papanicolaou smear of cervix with atypical squamous cells of undetermined significance (ASC-US) 7/2015     neg HPV, needs colp     Post op infection     s/p her appy as a child      Postpartum septic endometritis     fr.  prolonged rupture     RLQ abdominal pain     when getting up from sitting in area of appy     CC Referred Self, MD  No address on file on close of this encounter.

## 2022-08-30 ENCOUNTER — OFFICE VISIT (OUTPATIENT)
Dept: DERMATOLOGY | Facility: CLINIC | Age: 52
End: 2022-08-30
Payer: COMMERCIAL

## 2022-08-30 DIAGNOSIS — L30.9 ECZEMA, UNSPECIFIED TYPE: Primary | ICD-10-CM

## 2022-08-30 PROCEDURE — 99214 OFFICE O/P EST MOD 30 MIN: CPT | Performed by: DERMATOLOGY

## 2022-08-30 RX ORDER — TRIAMCINOLONE ACETONIDE 0.25 MG/G
OINTMENT TOPICAL 2 TIMES DAILY PRN
Qty: 454 G | Refills: 1 | Status: SHIPPED | OUTPATIENT
Start: 2022-08-30 | End: 2024-06-18

## 2022-08-30 ASSESSMENT — PAIN SCALES - GENERAL: PAINLEVEL: NO PAIN (0)

## 2022-08-30 NOTE — PATIENT INSTRUCTIONS
Can try using CeraVe moisturizing cream/La Roche Posay moisturizer for moisturizer on the body. Would consider switching bar soaps to Dove Sensitive Skin bar soap. Can continue Cetaphil body wash. Try using razor without strip. Continue using triamcinolone 0.025% ointment twice daily as needed for rashes on your body. I will order a referral for patch testing today.

## 2022-08-30 NOTE — LETTER
8/30/2022       RE: Mildred Tellez  [address]  [place] [address]     Dear Colleague,    Thank you for referring your patient, Mildred Tellez, to the [facility]MAT[facility] [place] at [facility]. Please see a copy of my visit note below.    [facility] Dermatology Note  Encounter Date: Aug 30, 2022  Office Visit     Dermatology Problem List:  # Eczematous dermatitis - s/p biopsy 2/22/2022 with subacute spongiotic dermatitis with eosinophils, concern for ACD  -Triamcinolone 0.025% ointment twice daily prn  -Patch testing referral placed 8/30/22  # Skin cancer screening 3/29/2022  ____________________________________________    Assessment & Plan:    # Eczematous dermatitis, chronic, active.  Concern for ACD.  - continue triamcinolone 0.025% ointment BID prn  - gentle moisturizer twice daily  - sensitive skin products were recommended   - referral to patch testing     # Xerosis  -Encouraged minimal soap use and moisturizing immediately after the shower.    Procedures Performed:   None    Follow-up: 9 month(s) in-person (after patch testing), or earlier for new or changing lesions    Staff and Resident:     Magalis Thomas MD  PGY2 Dermatology Resident    Staff Physician Comments:   I saw and evaluated the patient with the resident and I agree with the assessment and plan.  I was present for the examination.    Suri Ambrose MD    Department of Dermatology  [facility]: Phone: [phone], Fax: [phone]  9/1/2022     ____________________________________________    CC: Derm Problem (Eczema - improved with Triamcinolone. )    HPI:  Ms. Mildred Tellez is a(n) 51 year old female who presents today as a return patient for eczema follow up. Last seen by Jeannette Babb PA-C on 3/29/22, at which time patient had no lesions of  concern.    Mildred states that she has been experiencing intermittent, pruritic dermatitis since January while she was in [place]. Her rash initially started on the right calf, and spread to involve the buttocks, groin, axilla, and the anterior thighs as well. The skin became very dry as a result. She has tried treating the area with olive oil, various lotions, triamcinolone 0.025% cream twice daily, ketoconazole cream, and a course of Augmentin. Over the past month, she has experienced the rash two of the four weeks, which she says is as infrequent as it has been since its onset. Reported bathing more infrequently. She was camping when the skin was clear. When present, Mildred reports experiencing some associated fatigue related to the rash. She has currently been treating the rash with triamcinolone 0.025% cream as needed for flares approximately once weekly, and states that this resolves the rash within a day.      She reports recently being diagnosed with hypothyroidism in December 2021, and her rash symptoms started in January. Patient denies having a history of rashes as a child, seasonal allergies, or asthma. She denies ever undergoing allergy or patch testing previously. Patient is otherwise feeling well, without additional skin concerns.    Mildred uses Cetaphil body wash with infrequent use of Vanicream moisturizer. She has not been using deodorant routinely, though will use natural deodorants when going to a conference. She reports shaving weekly with one of many bar soaps (All Spice, Dove, Olay, Farideh Spring).    Labs Reviewed:  N/A    Physical Exam:  Vitals: There were no vitals taken for this visit.  SKIN: Focused examination of bilateral axillae, bilateral groin, legs, and buttocks was performed.  - Diffuse xerosis on bilateral lower legs  - There are numerous pink papules present on the L side of the groin, scattered along the bikini line.  - No other lesions of concern on areas examined.      Medications:  Current Outpatient Medications   Medication     levothyroxine (SYNTHROID/LEVOTHROID) 137 MCG tablet     No current facility-administered medications for this visit.      Past Medical History:   Patient Active Problem List   Diagnosis     Hypothyroidism, unspecified type     Rosacea     Hyperlipidemia LDL goal <160     RLQ abdominal pain - when getting up from sitting in area of appy     Abnormal Pap smear, can't excl hi gd sq intraepithelial lesion (ASC-H)     Compound nevus of neck - with dysplastic features - right neck 11/2013     ASCUS pap -  7/2013 - saw Dr. Beltran ob/gyn [place]      History of dysplastic nevus     Cervicalgia     Gas bloat syndrome     Overweight (BMI 25.0-29.9)     Food sensitivity with gastrointestinal symptoms- plants in the nightshade family - tomatoes, potatoes, eggplant, tomatillos , peppers      Multiple pigmented nevi     Painful lumpy left breast- resolved      Past Medical History:   Diagnosis Date     Abnormal Pap smear, can't excl hi gd sq intraepithelial lesion (ASC-H) 7/2013    colp neg     Bleeding gastric ulcer 1997     from taking aspirin at age 27 - couldn't tell whether resolved      Coxsackie virus infection - severe - summer of 2016 - hands and feet totally peeled and oral lesions very painful 9/10/2016     Hypothyroidism     after first pregnancy      Mastodynia- left breast- resolved  10/4/2013     Papanicolaou smear of cervix with atypical squamous cells of undetermined significance (ASC-US) 7/2015     neg HPV, needs colp     Post op infection     s/p her appy as a child      Postpartum septic endometritis     fr.  prolonged rupture     RLQ abdominal pain     when getting up from sitting in area of appy     CC Referred Self, MD  No address on file on close of this encounter.

## 2022-08-30 NOTE — NURSING NOTE
Dermatology Rooming Note    Mildred Tellez's goals for this visit include:   Chief Complaint   Patient presents with     Derm Problem     Eczema - improved with Triamcinolone.      Sherie Michel, CMA

## 2022-09-14 ENCOUNTER — PATIENT OUTREACH (OUTPATIENT)
Dept: DERMATOLOGY | Facility: CLINIC | Age: 52
End: 2022-09-14

## 2022-09-14 NOTE — TELEPHONE ENCOUNTER
Attempted to reach patient to schedule follow up in the Dermatology Clinic.  No answer,  LM on VM to call office and [employer] message sent..    Schedule with Dr. Suri Ambrose 5/2023.

## 2022-09-17 ENCOUNTER — MYC MEDICAL ADVICE (OUTPATIENT)
Dept: DERMATOLOGY | Facility: CLINIC | Age: 52
End: 2022-09-17

## 2022-11-08 ENCOUNTER — OFFICE VISIT (OUTPATIENT)
Dept: OPTOMETRY | Facility: CLINIC | Age: 52
End: 2022-11-08
Payer: COMMERCIAL

## 2022-11-08 DIAGNOSIS — H52.03 HYPEROPIA OF BOTH EYES: ICD-10-CM

## 2022-11-08 DIAGNOSIS — H52.4 PRESBYOPIA: Primary | ICD-10-CM

## 2022-11-08 PROCEDURE — 92014 COMPRE OPH EXAM EST PT 1/>: CPT | Performed by: OPTOMETRIST

## 2022-11-08 PROCEDURE — 92015 DETERMINE REFRACTIVE STATE: CPT | Performed by: OPTOMETRIST

## 2022-11-08 ASSESSMENT — VISUAL ACUITY
OD_SC: 20/100
OS_SC: 20/20
OS_SC: 20/100
OD_SC: 20/20
METHOD: SNELLEN - LINEAR

## 2022-11-08 ASSESSMENT — TONOMETRY
OS_IOP_MMHG: 12
IOP_METHOD: APPLANATION
OD_IOP_MMHG: 12

## 2022-11-08 ASSESSMENT — CONF VISUAL FIELD
OD_NORMAL: 1
OS_SUPERIOR_TEMPORAL_RESTRICTION: 0
OD_SUPERIOR_NASAL_RESTRICTION: 0
OS_NORMAL: 1
OS_INFERIOR_TEMPORAL_RESTRICTION: 0
OD_INFERIOR_TEMPORAL_RESTRICTION: 0
OS_SUPERIOR_NASAL_RESTRICTION: 0
OD_INFERIOR_NASAL_RESTRICTION: 0
OD_SUPERIOR_TEMPORAL_RESTRICTION: 0
METHOD: COUNTING FINGERS
OS_INFERIOR_NASAL_RESTRICTION: 0

## 2022-11-08 ASSESSMENT — REFRACTION_MANIFEST
OD_CYLINDER: +0.25
OS_CYLINDER: SPHERE
OD_ADD: +2.25
OS_SPHERE: +0.50
OD_AXIS: 105
METHOD_AUTOREFRACTION: 1
OS_CYLINDER: +0.25
OD_SPHERE: -0.25
OS_SPHERE: +0.75
OS_ADD: +2.25
OD_CYLINDER: +0.50
OS_AXIS: 032
OD_SPHERE: +0.50
OD_AXIS: 118

## 2022-11-08 ASSESSMENT — EXTERNAL EXAM - LEFT EYE: OS_EXAM: NORMAL

## 2022-11-08 ASSESSMENT — SLIT LAMP EXAM - LIDS
COMMENTS: NORMAL
COMMENTS: NORMAL

## 2022-11-08 ASSESSMENT — CUP TO DISC RATIO
OD_RATIO: 0.25
OS_RATIO: 0.15

## 2022-11-08 ASSESSMENT — EXTERNAL EXAM - RIGHT EYE: OD_EXAM: NORMAL

## 2022-11-08 NOTE — PROGRESS NOTES
Chief Complaint   Patient presents with     Annual Eye Exam        Last Eye Exam: 11/03/2021  Dilated Previously: Yes, side effects of dilation explained today    What are you currently using to see?  Glasses and readers - computer glasses      noticed a floater not sure in which eye in the last month  Distance Vision Acuity: Satisfied with vision    Near Vision Acuity: Satisfied with vision while reading and using computer with readers and with glasses    Eye Comfort: good  Do you use eye drops? : No      Katie Cobos - Optometric Assistant           Medical, surgical and family histories reviewed and updated 11/8/2022.       OBJECTIVE: See Ophthalmology exam    ASSESSMENT:    ICD-10-CM    1. Presbyopia  H52.4       2. Hyperopia of both eyes  H52.03         Minimal change in prescription     PLAN:     Optional change    Christianne Beltran OD

## 2022-11-08 NOTE — LETTER
11/8/2022         RE: Mildred Tellez  [address]  [place] [address]        Dear Colleague,    Thank you for referring your patient, [facility]ellez[facility]. Please see a copy of my visit note below.    Chief Complaint   Patient presents with     Annual Eye Exam        Last Eye Exam: 11/03/2021  Dilated Previously: Yes, side effects of dilation explained today    What are you currently using to see?  Glasses and readers - computer glasses      noticed a floater not sure in which eye in the last month  Distance Vision Acuity: Satisfied with vision    Near Vision Acuity: Satisfied with vision while reading and using computer with readers and with glasses    Eye Comfort: good  Do you use eye drops? : No      Katie Cobos - Optometric Assistant           Medical, surgical and family histories reviewed and updated 11/8/2022.       OBJECTIVE: See Ophthalmology exam    ASSESSMENT:    ICD-10-CM    1. Presbyopia  H52.4       2. Hyperopia of both eyes  H52.03         Minimal change in prescription     PLAN:     Optional change    Christianne Beltran OD         Again, thank you for allowing me to participate in the care of your patient.        Sincerely,        Christianne Beltran, OD

## 2022-12-21 ENCOUNTER — OFFICE VISIT (OUTPATIENT)
Dept: FAMILY MEDICINE | Facility: CLINIC | Age: 52
End: 2022-12-21
Payer: COMMERCIAL

## 2022-12-21 VITALS
BODY MASS INDEX: 27.82 KG/M2 | OXYGEN SATURATION: 97 % | WEIGHT: 157 LBS | TEMPERATURE: 97.4 F | HEART RATE: 103 BPM | HEIGHT: 63 IN | SYSTOLIC BLOOD PRESSURE: 112 MMHG | DIASTOLIC BLOOD PRESSURE: 78 MMHG

## 2022-12-21 DIAGNOSIS — R06.09 DYSPNEA ON EXERTION: ICD-10-CM

## 2022-12-21 DIAGNOSIS — Z86.018 HISTORY OF DYSPLASTIC NEVUS: ICD-10-CM

## 2022-12-21 DIAGNOSIS — D22.9 MULTIPLE PIGMENTED NEVI: ICD-10-CM

## 2022-12-21 DIAGNOSIS — N64.4 BREAST PAIN, LEFT: ICD-10-CM

## 2022-12-21 DIAGNOSIS — Z23 HIGH PRIORITY FOR COVID-19 VACCINATION: ICD-10-CM

## 2022-12-21 DIAGNOSIS — Z00.00 ROUTINE GENERAL MEDICAL EXAMINATION AT A HEALTH CARE FACILITY: Primary | ICD-10-CM

## 2022-12-21 DIAGNOSIS — B07.9 VIRAL WARTS, UNSPECIFIED TYPE: ICD-10-CM

## 2022-12-21 DIAGNOSIS — Z12.31 VISIT FOR SCREENING MAMMOGRAM: ICD-10-CM

## 2022-12-21 DIAGNOSIS — R63.5 WEIGHT GAIN: ICD-10-CM

## 2022-12-21 DIAGNOSIS — E66.3 OVERWEIGHT (BMI 25.0-29.9): ICD-10-CM

## 2022-12-21 DIAGNOSIS — E03.9 HYPOTHYROIDISM, UNSPECIFIED TYPE: ICD-10-CM

## 2022-12-21 DIAGNOSIS — D12.6 TUBULAR ADENOMA OF COLON: ICD-10-CM

## 2022-12-21 DIAGNOSIS — E78.5 HYPERLIPIDEMIA LDL GOAL <130: ICD-10-CM

## 2022-12-21 LAB
ALBUMIN SERPL BCG-MCNC: 4.4 G/DL (ref 3.5–5.2)
ALP SERPL-CCNC: 58 U/L (ref 35–104)
ALT SERPL W P-5'-P-CCNC: 32 U/L (ref 10–35)
ANION GAP SERPL CALCULATED.3IONS-SCNC: 12 MMOL/L (ref 7–15)
AST SERPL W P-5'-P-CCNC: 28 U/L (ref 10–35)
BILIRUB SERPL-MCNC: 0.2 MG/DL
BUN SERPL-MCNC: 17.4 MG/DL (ref 6–20)
CALCIUM SERPL-MCNC: 9.8 MG/DL (ref 8.6–10)
CHLORIDE SERPL-SCNC: 104 MMOL/L (ref 98–107)
CHOLEST SERPL-MCNC: 209 MG/DL
CREAT SERPL-MCNC: 0.73 MG/DL (ref 0.51–0.95)
DEPRECATED HCO3 PLAS-SCNC: 22 MMOL/L (ref 22–29)
ERYTHROCYTE [DISTWIDTH] IN BLOOD BY AUTOMATED COUNT: 12.9 % (ref 10–15)
GFR SERPL CREATININE-BSD FRML MDRD: >90 ML/MIN/1.73M2
GLUCOSE SERPL-MCNC: 91 MG/DL (ref 70–99)
HCT VFR BLD AUTO: 40.8 % (ref 35–47)
HDLC SERPL-MCNC: 42 MG/DL
HGB BLD-MCNC: 14.1 G/DL (ref 11.7–15.7)
LDLC SERPL CALC-MCNC: 143 MG/DL
MCH RBC QN AUTO: 31.3 PG (ref 26.5–33)
MCHC RBC AUTO-ENTMCNC: 34.6 G/DL (ref 31.5–36.5)
MCV RBC AUTO: 91 FL (ref 78–100)
NONHDLC SERPL-MCNC: 167 MG/DL
PLATELET # BLD AUTO: 312 10E3/UL (ref 150–450)
POTASSIUM SERPL-SCNC: 4.9 MMOL/L (ref 3.4–5.3)
PROT SERPL-MCNC: 7.7 G/DL (ref 6.4–8.3)
RBC # BLD AUTO: 4.5 10E6/UL (ref 3.8–5.2)
SODIUM SERPL-SCNC: 138 MMOL/L (ref 136–145)
T3 SERPL-MCNC: 101 NG/DL (ref 85–202)
T4 FREE SERPL-MCNC: 1.37 NG/DL (ref 0.9–1.7)
TRIGL SERPL-MCNC: 118 MG/DL
TSH SERPL DL<=0.005 MIU/L-ACNC: 0.3 UIU/ML (ref 0.3–4.2)
WBC # BLD AUTO: 8.8 10E3/UL (ref 4–11)

## 2022-12-21 PROCEDURE — 84480 ASSAY TRIIODOTHYRONINE (T3): CPT | Performed by: FAMILY MEDICINE

## 2022-12-21 PROCEDURE — 99396 PREV VISIT EST AGE 40-64: CPT | Mod: 25 | Performed by: FAMILY MEDICINE

## 2022-12-21 PROCEDURE — 84443 ASSAY THYROID STIM HORMONE: CPT | Performed by: FAMILY MEDICINE

## 2022-12-21 PROCEDURE — 17110 DESTRUCTION B9 LES UP TO 14: CPT | Performed by: FAMILY MEDICINE

## 2022-12-21 PROCEDURE — 80061 LIPID PANEL: CPT | Performed by: FAMILY MEDICINE

## 2022-12-21 PROCEDURE — 99214 OFFICE O/P EST MOD 30 MIN: CPT | Mod: 25 | Performed by: FAMILY MEDICINE

## 2022-12-21 PROCEDURE — 84439 ASSAY OF FREE THYROXINE: CPT | Performed by: FAMILY MEDICINE

## 2022-12-21 PROCEDURE — 85027 COMPLETE CBC AUTOMATED: CPT | Performed by: FAMILY MEDICINE

## 2022-12-21 PROCEDURE — 91312 COVID-19 VACCINE BIVALENT BOOSTER 12+ (PFIZER): CPT | Performed by: FAMILY MEDICINE

## 2022-12-21 PROCEDURE — 80053 COMPREHEN METABOLIC PANEL: CPT | Performed by: FAMILY MEDICINE

## 2022-12-21 PROCEDURE — 36415 COLL VENOUS BLD VENIPUNCTURE: CPT | Performed by: FAMILY MEDICINE

## 2022-12-21 PROCEDURE — 0124A COVID-19 VACCINE BIVALENT BOOSTER 12+ (PFIZER): CPT | Performed by: FAMILY MEDICINE

## 2022-12-21 RX ORDER — LEVOTHYROXINE SODIUM 137 UG/1
137 TABLET ORAL DAILY
Qty: 90 TABLET | Refills: 3 | Status: SHIPPED | OUTPATIENT
Start: 2022-12-21 | End: 2023-01-24

## 2022-12-21 ASSESSMENT — ENCOUNTER SYMPTOMS
EYE PAIN: 0
ABDOMINAL PAIN: 0
HEADACHES: 0
NAUSEA: 0
FREQUENCY: 0
CONSTIPATION: 0
PALPITATIONS: 0
DIZZINESS: 0
CHILLS: 0
MYALGIAS: 0
ARTHRALGIAS: 0
COUGH: 0
SHORTNESS OF BREATH: 0
WEAKNESS: 0
NERVOUS/ANXIOUS: 0
PARESTHESIAS: 0
DYSURIA: 0
JOINT SWELLING: 0
HEMATURIA: 0
DIARRHEA: 0
HEARTBURN: 0
FEVER: 0
HEMATOCHEZIA: 0
BREAST MASS: 1

## 2022-12-21 NOTE — PROGRESS NOTES
SUBJECTIVE:   CC: Mildred is an 52 year old who presents for preventive health visit and the following other medical problems:      1. Routine general medical examination at a health care facility    2. Hypothyroidism, unspecified type- needs lab follow up - ? cause of weight gain     3. Hyperlipidemia LDL goal <130- needs lab follow up - not on statin     4. Multiple pigmented nevi- needs full body - skin exam     5. Dyspnea on exertion-  worse since covid-19 3/2022     6. History of dysplastic nevus    7. Weight gain- 12 lbs in last 12 weeks or so     8. Overweight (BMI 25.0-29.9)    9. High priority for COVID-19 vaccination    10. Visit for screening mammogram    11. Tubular adenoma of colon - x 2 on colonoscopy 3/23/2022 - required extra anesthesia = extra nausea & vomiting - may need general anesthesia in 2027 for next colonoscopy     12. Breast pain, left- during exam pt mentioned left breast tenderness and there was some mild firmness noted from 12:00 to 3:00     13. Viral warts, unspecified type- right mid neck      Patient has been advised of split billing requirements and indicates understanding: Yes  Healthy Habits:     Getting at least 3 servings of Calcium per day:  Yes    Bi-annual eye exam:  Yes    Dental care twice a year:  Yes    Sleep apnea or symptoms of sleep apnea:  None    Diet:  Regular (no restrictions)    Frequency of exercise:  4-5 days/week    Duration of exercise:  Greater than 60 minutes    Taking medications regularly:  Yes    Medication side effects:  None    PHQ-2 Total Score: 0    Additional concerns today:  No    Gaining weight - 12 lbs in the last 3 months or so. Hasn't had a period since 9/2023.   Some hot flashes at that time.  None since then.  Walks 150-250  minutes a week.  Does light weight lifting as well.       See above and below for review of colonoscopy findings from 3/2022 colonoscopy.      Hyperlipidemia Follow-Up- not on a statin. Is fasting as well today.       Hypothyroidism Follow-up      Since last visit, patient describes the following symptoms: weight gain of 20 lbs      Today's PHQ-2 Score:   PHQ-2 ( 1999 Pfizer) 12/21/2022   Q1: Little interest or pleasure in doing things 0   Q2: Feeling down, depressed or hopeless 0   PHQ-2 Score 0   PHQ-2 Total Score (12-17 Years)- Positive if 3 or more points; Administer PHQ-A if positive -   Q1: Little interest or pleasure in doing things Not at all   Q2: Feeling down, depressed or hopeless Not at all   PHQ-2 Score 0       Social History     Tobacco Use     Smoking status: Never     Smokeless tobacco: Never   Substance Use Topics     Alcohol use: Never     Alcohol/week: 0.0 standard drinks         Alcohol Use 12/21/2022   Prescreen: >3 drinks/day or >7 drinks/week? No   Prescreen: >3 drinks/day or >7 drinks/week? -       Reviewed orders with patient.  Reviewed health maintenance and updated orders accordingly - Yes  Lab work is in process  Labs reviewed in EPIC  BP Readings from Last 3 Encounters:   12/21/22 112/78   03/29/22 102/70   03/23/22 97/62    Wt Readings from Last 3 Encounters:   12/21/22 71.2 kg (157 lb)   02/19/22 65.8 kg (145 lb)   01/06/22 66.7 kg (147 lb)                  Patient Active Problem List   Diagnosis     Hypothyroidism, unspecified type     Rosacea     Hyperlipidemia LDL goal <130     RLQ abdominal pain - when getting up from sitting in area of appy     Abnormal Pap smear, can't excl hi gd sq intraepithelial lesion (ASC-H)     Compound nevus of neck - with dysplastic features - right neck 11/2013     ASCUS pap -  7/2013 - saw Dr. Beltran [facility] [place]      History of dysplastic nevus     Cervicalgia     Gas bloat syndrome     Overweight (BMI 25.0-29.9)     Food sensitivity with gastrointestinal symptoms- plants in the nightshade family - tomatoes, potatoes, eggplant, tomatillos , peppers      Multiple pigmented nevi     Painful lumpy left breast- resolved      Tubular adenoma of colon - x 2 on  colonoscopy 3/23/2022 - required extra anesthesia = extra nausea & vomiting - may need general anesthesia in  for next colonoscopy      Past Surgical History:   Procedure Laterality Date     COLONOSCOPY N/A 3/23/2022    Procedure: COLONOSCOPY, FLEXIBLE, WITH polypectomies USING hot SNARE and cold jumbo forceps;  Surgeon: Eric Aguilar MD;  Location:  GI     COLONOSCOPY N/A 3/23/2022    Procedure: COLONOSCOPY, WITH POLYPECTOMY AND BIOPSY;  Surgeon: Eric Aguilar MD;  Location:  GI     ZZC APPENDECTOMY  1984       Social History     Tobacco Use     Smoking status: Never     Smokeless tobacco: Never   Substance Use Topics     Alcohol use: Never     Alcohol/week: 0.0 standard drinks     Family History   Problem Relation Age of Onset     Hypertension Mother      C.A.D. Father         on blood thinners for his PAD     Cardiovascular Father         peripheral vascular disease- very heavy smoker      Lung Cancer Father      Hypertension Maternal Grandmother      Diabetes Paternal Grandmother          of complications fr. diabetes     Breast Cancer Other         paternal great aunt     Colon Cancer No family hx of      Glaucoma No family hx of      Macular Degeneration No family hx of          Current Outpatient Medications   Medication Sig Dispense Refill     levothyroxine (SYNTHROID/LEVOTHROID) 137 MCG tablet Take 1 tablet (137 mcg) by mouth daily 90 tablet 3     triamcinolone (KENALOG) 0.025 % external ointment Apply topically 2 times daily as needed (itchy rashes on the body) 454 g 1     No Known Allergies  Recent Labs   Lab Test 22  1609 21  0834 21  0836 21  0836 20  0916 19  1442 19  0922   LDL  --  111* 139* 145* 155*  --  113*   HDL  --  45* 39* 43* 31*  --  34*   TRIG  --  74 109 101 126  --  116   ALT  --  18 25  --  43  --  23   CR  --  0.72  --   --  0.84  --  0.69   GFRESTIMATED  --  >90  --   --  81  --  >90   GFRESTBLACK  --   --   --   --  >90   --  >90   POTASSIUM  --  4.3  --   --  4.7  --  4.1   TSH 0.80 0.46 0.74 7.01* 3.27   < > 0.31*    < > = values in this interval not displayed.        Breast Cancer Screening:    Breast CA Risk Assessment (FHS-7) 12/20/2021   Do you have a family history of breast, colon, or ovarian cancer? No / Unknown       Mammogram Screening: Recommended annual mammography  Pertinent mammograms are reviewed under the imaging tab.    History of abnormal Pap smear: YES - updated in Problem List and Health Maintenance accordingly  PAP / HPV Latest Ref Rng & Units 12/20/2021 9/17/2018 9/10/2016   PAP   Negative for Intraepithelial Lesion or Malignancy (NILM) - -   PAP (Historical) - - NIL NIL   HPV16 Negative Negative Negative Negative   HPV18 Negative Negative Negative Negative   HRHPV Negative Negative Negative Negative     Reviewed and updated as needed this visit by clinical staff   Tobacco  Allergies  Meds  Problems  Med Hx  Surg Hx  Fam Hx          Reviewed and updated as needed this visit by Provider                 Past Medical History:   Diagnosis Date     Abnormal Pap smear, can't excl hi gd sq intraepithelial lesion (ASC-H) 7/2013    colp neg     Bleeding gastric ulcer 1997     from taking aspirin at age 27 - couldn't tell whether resolved      Coxsackie virus infection - severe - summer of 2016 - hands and feet totally peeled and oral lesions very painful 9/10/2016     Hypothyroidism     after first pregnancy      Mastodynia- left breast- resolved  10/4/2013     Papanicolaou smear of cervix with atypical squamous cells of undetermined significance (ASC-US) 7/2015     neg HPV, needs colp     Post op infection     s/p her appy as a child      Postpartum septic endometritis     fr.  prolonged rupture     RLQ abdominal pain     when getting up from sitting in area of appy      Past Surgical History:   Procedure Laterality Date     COLONOSCOPY N/A 3/23/2022    Procedure: COLONOSCOPY, FLEXIBLE, WITH polypectomies USING  "hot SNARE and cold jumbo forceps;  Surgeon: Eric Aguilar MD;  Location:  GI     COLONOSCOPY N/A 3/23/2022    Procedure: COLONOSCOPY, WITH POLYPECTOMY AND BIOPSY;  Surgeon: Eric Aguilar MD;  Location:  GI     ZZ APPENDECTOMY       OB History    Para Term  AB Living   2 2 2 0 0 2   SAB IAB Ectopic Multiple Live Births   0 0 0 0 0      # Outcome Date GA Lbr Rudi/2nd Weight Sex Delivery Anes PTL Lv   2 Term               Birth Comments:    1 Term               Birth Comments: prolonged ROM- postpartum endometritis -       Obstetric Comments   First pregnancy - Moiz - developed chorioamnionitis & postpartum endometritis despite IV abx from prolonged rupture = 30 hrs.  Also had partially retained placenta that she passed on her own 3 days s/p delivery.       No problems with second = Sherie.       Review of Systems   Constitutional: Negative for chills and fever.   HENT: Negative for congestion, ear pain and hearing loss.    Eyes: Negative for pain and visual disturbance.   Respiratory: Negative for cough and shortness of breath.    Cardiovascular: Negative for chest pain, palpitations and peripheral edema.   Gastrointestinal: Negative for abdominal pain, constipation, diarrhea, heartburn, hematochezia and nausea.   Breasts:  Positive for breast mass. Negative for tenderness.   Genitourinary: Negative for dysuria, frequency, genital sores, hematuria, pelvic pain, urgency, vaginal bleeding and vaginal discharge.   Musculoskeletal: Negative for arthralgias, joint swelling and myalgias.   Skin: Positive for rash.   Neurological: Negative for dizziness, weakness, headaches and paresthesias.   Psychiatric/Behavioral: Negative for mood changes. The patient is not nervous/anxious.           OBJECTIVE:   /78   Pulse 103   Temp 97.4  F (36.3  C)   Ht 1.6 m (5' 3\")   Wt 71.2 kg (157 lb)   LMP 2022   SpO2 97%   BMI 27.81 kg/m    Physical Exam  GENERAL " APPEARANCE: healthy, alert and no distress  EYES: Eyes grossly normal to inspection, PERRL and conjunctivae and sclerae normal  HENT: ear canals and TM's normal, nose and mouth without ulcers or lesions, oropharynx clear and oral mucous membranes moist  NECK: no adenopathy, no asymmetry, masses, or scars and thyroid normal to palpation  RESP: lungs clear to auscultation - no rales, rhonchi or wheezes  BREAST:during exam pt mentioned some  left breast tenderness and there was some mild subtle firmness noted from 12:00 to 3:00 - pt stated that she had noted this starting this past summer , otherwise  normal without other  masses, tenderness or nipple discharge and no palpable axillary masses or adenopathy  CV: regular rate and rhythm, normal S1 S2, no S3 or S4, no murmur, click or rub, no peripheral edema and peripheral pulses strong  ABDOMEN: soft, nontender, no hepatosplenomegaly, no masses and bowel sounds normal  MS: no musculoskeletal defects are noted and gait is age appropriate without ataxia  SKIN: has a warty lesion on right mid anterior neck - treated today with liquid n2 x 2 for 3 seconds each and then treated topically with podophyllin and a bandaid.  no suspicious lesions or rashes.   NEURO: Normal strength and tone, sensory exam grossly normal, mentation intact and speech normal  PSYCH: mentation appears normal and affect normal/bright    Diagnostic Test Results:  Labs reviewed in Epic    ASSESSMENT/PLAN:       ICD-10-CM    1. Routine general medical examination at a health care facility  Z00.00       2. Hypothyroidism, unspecified type- needs lab follow up - ? cause of weight gain   E03.9 REVIEW OF HEALTH MAINTENANCE PROTOCOL ORDERS     levothyroxine (SYNTHROID/LEVOTHROID) 137 MCG tablet     T3 total     T4 free     TSH      3. Hyperlipidemia LDL goal <130- needs lab follow up - not on statin   E78.5 REVIEW OF HEALTH MAINTENANCE PROTOCOL ORDERS     Lipid panel reflex to direct LDL Fasting      "Comprehensive metabolic panel      4. Multiple pigmented nevi- needs full body - skin exam   D22.9 Adult Dermatology Referral      5. Dyspnea on exertion-  worse since covid-19 3/2022   R06.09 CBC with platelets     Physical Therapy Referral      6. History of dysplastic nevus  Z86.018 Adult Dermatology Referral      7. Weight gain- 12 lbs in last 12 weeks or so   R63.5 Nutrition Referral      8. Overweight (BMI 25.0-29.9)  E66.3 Physical Therapy Referral     Nutrition Referral      9. High priority for COVID-19 vaccination  Z23 COVID-19,PF,PFIZER BOOSTER BIVALENT (12+YRS)      10. Visit for screening mammogram  Z12.31 MA Screening Bilateral w/ Justin      11. Tubular adenoma of colon - x 2 on colonoscopy 3/23/2022 - required extra anesthesia = extra nausea & vomiting - may need general anesthesia in 2027 for next colonoscopy   D12.6       12. Breast pain, left- during exam pt mentioned left breast tenderness and there was some mild firmness noted from 12:00 to 3:00   N64.4 MA Diagnostic Left w/ Justin     US Breast Left Complete 4 Quadrants         Pt feels more short of breath with exertion since having covid 19  - 3/2022- desires to start running again to help lose weight.   Doesn't always feel SOB with walking in the summertime, but with stairs , wonders if it comes from gaining weight.       Return in about 1 year (around 12/21/2023) for Physical/Preventative Visit, w/ Dr. OTERO for 40 minute appointment. or sooner, if needed.       Patient has been advised of split billing requirements and indicates understanding: Yes      COUNSELING:  Reviewed preventive health counseling, as reflected in patient instructions      BMI:   Estimated body mass index is 27.81 kg/m  as calculated from the following:    Height as of this encounter: 1.6 m (5' 3\").    Weight as of this encounter: 71.2 kg (157 lb).   Weight management plan: Discussed healthy diet and exercise guidelines      She reports that she has never smoked. She has " never used smokeless tobacco.           Mary Langston MD  [facility].

## 2022-12-21 NOTE — PATIENT INSTRUCTIONS
Preventive Health Recommendations  Female Ages 50 - 64    Yearly exam: See your health care provider every year in order to  Review health changes.   Discuss preventive care.    Review your medicines if your doctor has prescribed any.    Get a Pap test every three years (unless you have an abnormal result and your provider advises testing more often).  If you get Pap tests with HPV test, you only need to test every 5 years, unless you have an abnormal result.   You do not need a Pap test if your uterus was removed (hysterectomy) and you have not had cancer.  You should be tested each year for STDs (sexually transmitted diseases) if you're at risk.   Have a mammogram every 1 to 2 years.  Have a colonoscopy at age 50, or have a yearly FIT test (stool test). These exams screen for colon cancer.    Have a cholesterol test every 5 years, or more often if advised.  Have a diabetes test (fasting glucose) every three years. If you are at risk for diabetes, you should have this test more often.   If you are at risk for osteoporosis (brittle bone disease), think about having a bone density scan (DEXA).    Shots: Get a flu shot each year. Get a tetanus shot every 10 years.    Nutrition:   Eat at least 5 servings of fruits and vegetables each day.  Eat whole-grain bread, whole-wheat pasta and brown rice instead of white grains and rice.  Get adequate Calcium and Vitamin D.     Lifestyle  Exercise at least 150 minutes a week (30 minutes a day, 5 days a week). This will help you control your weight and prevent disease.  Limit alcohol to one drink per day.  No smoking.   Wear sunscreen to prevent skin cancer.   See your dentist every six months for an exam and cleaning.  See your eye doctor every 1 to 2 years.    Thank you so much for choosing [facility].  Please contact us with any questions that you may have.   We appreciate the opportunity to serve you now and look forward to supporting your  healthcare needs for a long time to come!    Most Sincerely,     Mary Langston MD

## 2022-12-22 DIAGNOSIS — Z86.16 HISTORY OF COVID-19: Primary | ICD-10-CM

## 2022-12-23 ENCOUNTER — HOSPITAL ENCOUNTER (OUTPATIENT)
Dept: MAMMOGRAPHY | Facility: CLINIC | Age: 52
Discharge: HOME | End: 2022-12-23
Attending: FAMILY MEDICINE
Payer: COMMERCIAL

## 2022-12-23 ENCOUNTER — HOSPITAL ENCOUNTER (OUTPATIENT)
Dept: ULTRASOUND IMAGING | Facility: CLINIC | Age: 52
Discharge: HOME | End: 2022-12-23
Attending: FAMILY MEDICINE
Payer: COMMERCIAL

## 2022-12-23 DIAGNOSIS — N64.4 BREAST PAIN, LEFT: ICD-10-CM

## 2022-12-23 PROCEDURE — 76642 ULTRASOUND BREAST LIMITED: CPT | Mod: LT

## 2022-12-23 PROCEDURE — 77062 BREAST TOMOSYNTHESIS BI: CPT

## 2023-10-20 ENCOUNTER — PATIENT OUTREACH (OUTPATIENT)
Dept: GASTROENTEROLOGY | Facility: CLINIC | Age: 53
End: 2023-10-20
Payer: COMMERCIAL

## 2023-11-24 ENCOUNTER — PATIENT OUTREACH (OUTPATIENT)
Dept: CARE COORDINATION | Facility: CLINIC | Age: 53
End: 2023-11-24
Payer: COMMERCIAL

## 2023-12-08 DIAGNOSIS — E03.9 HYPOTHYROIDISM, UNSPECIFIED TYPE: ICD-10-CM

## 2023-12-10 RX ORDER — LEVOTHYROXINE SODIUM 137 UG/1
137 TABLET ORAL DAILY
Qty: 90 TABLET | Refills: 3 | Status: SHIPPED | OUTPATIENT
Start: 2023-12-10 | End: 2024-06-04 | Stop reason: DRUGHIGH

## 2023-12-22 ENCOUNTER — PATIENT OUTREACH (OUTPATIENT)
Dept: CARE COORDINATION | Facility: CLINIC | Age: 53
End: 2023-12-22

## 2024-02-03 ENCOUNTER — HEALTH MAINTENANCE LETTER (OUTPATIENT)
Age: 54
End: 2024-02-03

## 2024-04-13 ENCOUNTER — HEALTH MAINTENANCE LETTER (OUTPATIENT)
Age: 54
End: 2024-04-13

## 2024-05-15 ENCOUNTER — ANCILLARY PROCEDURE (OUTPATIENT)
Dept: MAMMOGRAPHY | Facility: CLINIC | Age: 54
End: 2024-05-15
Attending: FAMILY MEDICINE
Payer: COMMERCIAL

## 2024-05-15 DIAGNOSIS — Z12.31 ENCOUNTER FOR SCREENING MAMMOGRAM FOR BREAST CANCER: ICD-10-CM

## 2024-05-15 PROCEDURE — 77067 SCR MAMMO BI INCL CAD: CPT | Mod: TC | Performed by: RADIOLOGY

## 2024-05-15 PROCEDURE — 77063 BREAST TOMOSYNTHESIS BI: CPT | Mod: TC | Performed by: RADIOLOGY

## 2024-05-30 ENCOUNTER — OFFICE VISIT (OUTPATIENT)
Dept: FAMILY MEDICINE | Facility: CLINIC | Age: 54
End: 2024-05-30
Payer: COMMERCIAL

## 2024-05-30 ENCOUNTER — TELEPHONE (OUTPATIENT)
Dept: FAMILY MEDICINE | Facility: CLINIC | Age: 54
End: 2024-05-30

## 2024-05-30 VITALS
TEMPERATURE: 97.8 F | RESPIRATION RATE: 16 BRPM | HEIGHT: 63 IN | DIASTOLIC BLOOD PRESSURE: 70 MMHG | SYSTOLIC BLOOD PRESSURE: 100 MMHG | WEIGHT: 158 LBS | BODY MASS INDEX: 28 KG/M2 | OXYGEN SATURATION: 99 % | HEART RATE: 79 BPM

## 2024-05-30 DIAGNOSIS — Z12.4 SCREENING FOR CERVICAL CANCER: ICD-10-CM

## 2024-05-30 DIAGNOSIS — E78.5 HYPERLIPIDEMIA LDL GOAL <130: ICD-10-CM

## 2024-05-30 DIAGNOSIS — D22.4 COMPOUND NEVUS OF NECK: ICD-10-CM

## 2024-05-30 DIAGNOSIS — Z23 NEED FOR TDAP VACCINATION: ICD-10-CM

## 2024-05-30 DIAGNOSIS — E66.3 OVERWEIGHT (BMI 25.0-29.9): ICD-10-CM

## 2024-05-30 DIAGNOSIS — R87.610 ATYPICAL SQUAMOUS CELLS CANNOT EXCLUDE HIGH GRADE SQUAMOUS INTRAEPITHELIAL LESION (ASC-H) ON PAPANICOLAO SMEAR: ICD-10-CM

## 2024-05-30 DIAGNOSIS — L68.9: ICD-10-CM

## 2024-05-30 DIAGNOSIS — N95.1 SYMPTOMATIC MENOPAUSAL OR FEMALE CLIMACTERIC STATES: ICD-10-CM

## 2024-05-30 DIAGNOSIS — Z23 NEED FOR HEPATITIS B VACCINATION: ICD-10-CM

## 2024-05-30 DIAGNOSIS — E03.9 HYPOTHYROIDISM, UNSPECIFIED TYPE: ICD-10-CM

## 2024-05-30 DIAGNOSIS — D22.9 MULTIPLE PIGMENTED NEVI: ICD-10-CM

## 2024-05-30 DIAGNOSIS — D12.6 TUBULAR ADENOMA OF COLON: ICD-10-CM

## 2024-05-30 DIAGNOSIS — Z13.820 SCREENING FOR OSTEOPOROSIS: ICD-10-CM

## 2024-05-30 DIAGNOSIS — Z13.6 CARDIOVASCULAR SCREENING; LDL GOAL LESS THAN 130: ICD-10-CM

## 2024-05-30 DIAGNOSIS — Z00.01 ENCOUNTER FOR ROUTINE ADULT MEDICAL EXAM WITH ABNORMAL FINDINGS: Primary | ICD-10-CM

## 2024-05-30 PROCEDURE — 90472 IMMUNIZATION ADMIN EACH ADD: CPT | Performed by: FAMILY MEDICINE

## 2024-05-30 PROCEDURE — 87624 HPV HI-RISK TYP POOLED RSLT: CPT | Performed by: FAMILY MEDICINE

## 2024-05-30 PROCEDURE — 90715 TDAP VACCINE 7 YRS/> IM: CPT | Performed by: FAMILY MEDICINE

## 2024-05-30 PROCEDURE — G0145 SCR C/V CYTO,THINLAYER,RESCR: HCPCS | Performed by: FAMILY MEDICINE

## 2024-05-30 PROCEDURE — 99214 OFFICE O/P EST MOD 30 MIN: CPT | Mod: 25 | Performed by: FAMILY MEDICINE

## 2024-05-30 PROCEDURE — 90746 HEPB VACCINE 3 DOSE ADULT IM: CPT | Performed by: FAMILY MEDICINE

## 2024-05-30 PROCEDURE — 90471 IMMUNIZATION ADMIN: CPT | Performed by: FAMILY MEDICINE

## 2024-05-30 PROCEDURE — 99396 PREV VISIT EST AGE 40-64: CPT | Mod: 25 | Performed by: FAMILY MEDICINE

## 2024-05-30 SDOH — HEALTH STABILITY: PHYSICAL HEALTH: ON AVERAGE, HOW MANY DAYS PER WEEK DO YOU ENGAGE IN MODERATE TO STRENUOUS EXERCISE (LIKE A BRISK WALK)?: 3 DAYS

## 2024-05-30 SDOH — HEALTH STABILITY: PHYSICAL HEALTH: ON AVERAGE, HOW MANY MINUTES DO YOU ENGAGE IN EXERCISE AT THIS LEVEL?: 150+ MIN

## 2024-05-30 ASSESSMENT — SOCIAL DETERMINANTS OF HEALTH (SDOH): HOW OFTEN DO YOU GET TOGETHER WITH FRIENDS OR RELATIVES?: ONCE A WEEK

## 2024-05-30 NOTE — PATIENT INSTRUCTIONS
" [facility]  [address] [place] [address]  Office: [phone]   Fax:    [phone]     Return in about 1 year (around 5/30/2025) for Wellness/Preventative Visit, thyroid, w/ Dr. OTERO for 40 min appt.     Thank you so much or choosing [facility]  for your Health Care. It was a pleasure seeing you at your visit today! Please contact us with any questions or concerns you may have.                   Mary Langston MD                              To reach your [facility] care team after hours call:   [phone] press #[phone] \"to speak with your care team\".  This will get you to our clinic instead of routing to central [facility]  scheduling.     PLEASE NOTE OUR HOURS HAVE CHANGED secondary to COVID-19 coronavirus pandemic, as we are trying to minimize patient exposure to the virus,  which is now widespread in the [facility].  These hours may change with very little notice.  We apologize for any inconvenience.       Our current clinic hours are:          Monday- Thursday   7:00am - 6:00pm  in person.      Friday  7:00am- 5:00pm                       Saturday and Sunday : Closed to in person and virtual visits        We have telephone and virtual visit times available between    7:00am - 6pm on Monday-Friday as well.                                                Phone:  [phone]      Our pharmacy hours: Monday through Friday 8:00am to 5:00pm                        Saturday - 9:00 am to 12 noon       Sunday : Closed.              Phone:  [phone]              ###  Please note: at this time we are not accepting any walk-in visits. ###      There is also information available at our web site:  [email or web address]    If your provider ordered any lab tests and you do not receive the results within 10 business days, please call the clinic.    If you need a medication refill please contact your pharmacy.  Please allow 3 " business days for your refill to be completed.    Our clinic offers telephone visits and e visits.  Please ask one of your team members to explain more.      Use MyChart (secure email communication and access to your chart) to send your primary care provider a message or make an appointment. Ask someone on your Team how to sign up for [email or web address].

## 2024-05-30 NOTE — PROGRESS NOTES
Preventive Care Visit  [facility] [place]  Mary Langston MD, Family Medicine  May 30, 2024      Assessment & Plan       ICD-10-CM    1. Encounter for routine adult medical exam with abnormal findings  Z00.01 REVIEW OF HEALTH MAINTENANCE PROTOCOL ORDERS      2. Hyperlipidemia LDL goal <130  E78.5 Lipid panel reflex to direct LDL Fasting      3. Hypothyroidism, unspecified type  E03.9 REVIEW OF HEALTH MAINTENANCE PROTOCOL ORDERS     TSH     T4 free     T3 total     T3 total     T4 free     TSH     levothyroxine (SYNTHROID/LEVOTHROID) 125 MCG tablet      4. Overweight (BMI 25.0-29.9)  E66.3       5. Tubular adenoma of colon - x 2 on colonoscopy 3/23/2022 - required extra anesthesia = extra nausea & vomiting - may need general anesthesia in 2027 for next colonoscopy   D12.6       6. Hypertrichosis, acquired- facial - secondary to menopausal hormone changes  L68.9 eflornithine HCl 13.9 % CREA      7. Need for hepatitis B vaccination  Z23 HEPATITIS B, ADULT 20+ (ENGERIX-B/RECOMBIVAX HB)      8. Need for Tdap vaccination  Z23 TDAP 10-64Y (ADACEL,BOOSTRIX)      9. CARDIOVASCULAR SCREENING; LDL GOAL LESS THAN 130  Z13.6 Lipid panel reflex to direct LDL Fasting     CBC with platelets     Comprehensive metabolic panel      10. Screening for osteoporosis  Z13.820 DX Bone Density      11. Multiple pigmented nevi  D22.9 Adult Dermatology  Referral      12. Compound nevus of neck - with dysplastic features - right neck 11/2013  D22.4 Adult Dermatology  Referral      13. Atypical squamous cells cannot exclude high grade squamous intraepithelial lesion (ASC-H) on Papanicolao smear  R87.610 Gynecologic Cytology (Pap) and HPV - Recommended Age 30-65 Years     Gynecologic Cytology (PAP)      14. Screening for cervical cancer  Z12.4 Gynecologic Cytology (Pap) and HPV - Recommended Age 30-65 Years     Gynecologic Cytology (PAP)        Addended to reflect pt's above ordered lab results.   TSH  "  Date Value Ref Range Status   05/31/2024 0.07 (L) 0.30 - 4.20 uIU/mL Final   03/29/2022 0.80 0.40 - 4.00 mU/L Final   06/23/2021 0.74 0.40 - 4.00 mU/L Final    See result note.  Will decrease levothyroxine from 137mcg daily to 125 mcg daily. And recheck labs in 3 months. Future orders placed in Epic (our computer record)  for labs.     Patient has been advised of split billing requirements and indicates understanding: Yes    BMI  Estimated body mass index is 27.99 kg/m  as calculated from the following:    Height as of this encounter: 1.6 m (5' 3\").    Weight as of this encounter: 71.7 kg (158 lb).   Weight management plan: Discussed healthy diet and exercise guidelines    Counseling  Appropriate preventive services were discussed with this patient, including applicable screening as appropriate for fall prevention, nutrition, physical activity, Tobacco-use cessation, weight loss and cognition.  Checklist reviewing preventive services available has been given to the patient.  Reviewed patient's diet, addressing concerns and/or questions.   She is at risk for lack of exercise and has been provided with information to increase physical activity for the benefit of her well-being.       MEDICATIONS:   Orders Placed This Encounter   Medications    eflornithine HCl 13.9 % CREA     Sig: Apply 1/2 inch to facial hair areas nightly     Dispense:  45 g     Refill:  3    levothyroxine (SYNTHROID/LEVOTHROID) 125 MCG tablet     Sig: Take 1 tablet (125 mcg) by mouth daily     Dispense:  90 tablet     Refill:  1          - Continue other medications without change  Work on weight loss  Regular exercise  See Patient Instructions    Return in about 1 year (around 5/30/2025) for Wellness/Preventative Visit, thyroid, w/ Dr. OTERO for 40 min appt.          Mary Langston MD        Danii Levy is a 53 year old, presenting for the following:  Physical  and the following other medical problems:      1. Encounter for routine " adult medical exam with abnormal findings    2. Hyperlipidemia LDL goal <130    3. Hypothyroidism, unspecified type    4. Overweight (BMI 25.0-29.9)    5. Tubular adenoma of colon - x 2 on colonoscopy 3/23/2022 - required extra anesthesia = extra nausea & vomiting - may need general anesthesia in 2027 for next colonoscopy     6. 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 Need for hepatitis B vaccination    8. Need for Tdap vaccination    9. CARDIOVASCULAR SCREENING; LDL GOAL LESS THAN 130    10. Screening for osteoporosis    11. Multiple pigmented nevi    12. Compound nevus of neck - with dysplastic features - right neck 11/2013    13. Atypical squamous cells cannot exclude high grade squamous intraepithelial lesion (ASC-H) on Papanicolao smear    14. Screening for cervical cancer            5/30/2024     4:23 PM   Additional Questions   Roomed by gomez tomlin        Health Care Directive:   Patient does not have a Health Care Directive or Living Will: Discussed advance care planning with patient; however, patient declined at this time.    HPI:     Last menses 6/2023 - hot flashes, night sweats , a little bit of mood swings - easier to feel a little sad one time,  has gained a little weight. Feels hungry a lot.  Some difficulty falling asleep and  also with staying asleep.  Facial hair. Mom had a little bit of a mustache.  MGM  had some postmenopausal facial hair as well. No brain fog and feels sharp at work.     Discussed HRT in detail today - pt would like to wait on this for now, but is open to trying Vaniqa for the facial hair growth suppression.        Wt Readings from Last 5 Encounters:   05/30/24 71.7 kg (158 lb)   12/21/22 71.2 kg (157 lb)   02/19/22 65.8 kg (145 lb)   01/06/22 66.7 kg (147 lb)   12/20/21 66.7 kg (147 lb)         Hypothyroidism Follow-up:     Since last visit, patient describes the following symptoms: Weight stable, no hair loss, no skin changes, no  constipation, no loose stools          5/30/2024   General Health   How would you rate your overall physical health? Good   Feel stress (tense, anxious, or unable to sleep) Not at all         5/30/2024   Nutrition   Three or more servings of calcium each day? Yes   Diet: Low fat/cholesterol   How many servings of fruit and vegetables per day? 4 or more   How many sweetened beverages each day? 0-1         5/30/2024   Exercise   Days per week of moderate/strenous exercise 3 days   Average minutes spent exercising at this level 150+ min         5/30/2024   Social Factors   Frequency of gathering with friends or relatives Once a week   Worry food won't last until get money to buy more No   Food not last or not have enough money for food? No   Do you have housing?  Yes   Are you worried about losing your housing? No   Lack of transportation? No   Unable to get utilities (heat,electricity)? No         5/30/2024   Fall Risk   Fallen 2 or more times in the past year? No   Trouble with walking or balance? No          5/30/2024   Dental   Dentist two times every year? Yes         5/30/2024   TB Screening   Were you born outside of the [place]? Yes         Today's PHQ-2 Score:       5/30/2024     4:22 PM   PHQ-2 ( 1999 Pf[employer])   Q1: Little interest or pleasure in doing things 0   Q2: Feeling down, depressed or hopeless 0   PHQ-2 Score 0   Q1: Little interest or pleasure in doing things Not at all   Q2: Feeling down, depressed or hopeless Not at all   PHQ-2 Score 0           5/30/2024   Substance Use   Alcohol more than 3/day or more than 7/wk No   Do you use any other substances recreationally? No     Social History     Tobacco Use    Smoking status: Never    Smokeless tobacco: Never   Substance Use Topics    Alcohol use: Never     Alcohol/week: 0.0 standard drinks of alcohol    Drug use: No     Comment: no herbal meds either            5/15/2024   LAST FHS-7 RESULTS   1st degree relative breast or ovarian cancer No   Any relative  bilateral breast cancer No   Any male have breast cancer No   Any ONE woman have BOTH breast AND ovarian cancer No   Any woman with breast cancer before 50yrs No   2 or more relatives with breast AND/OR ovarian cancer No   2 or more relatives with breast AND/OR bowel cancer No        Mammogram Screening - Mammogram every 1-2 years updated in Health Maintenance based on mutual decision making    Last mammo 5/15/2024: normal/negative.         2024   STI Screening   New sexual partner(s) since last STI/HIV test? No     History of abnormal Pap smear: YES - reflected in Problem List and Health Maintenance accordingly        Latest Ref Rng & Units 2021     7:51 AM 2018     5:37 PM 2018     5:05 PM   PAP / HPV   PAP  Negative for Intraepithelial Lesion or Malignancy (NILM)      PAP (Historical)    NIL    HPV 16 DNA Negative Negative  Negative     HPV 18 DNA Negative Negative  Negative     Other HR HPV Negative Negative  Negative       ASCVD Risk   The 10-year ASCVD risk score (Miguel ART, et al., 2019) is: 1.4%    Values used to calculate the score:      Age: 53 years      Sex: Female      Is Non- : No      Diabetic: No      Tobacco smoker: No      Systolic Blood Pressure: 100 mmHg      Is BP treated: No      HDL Cholesterol: 42 mg/dL      Total Cholesterol: 209 mg/dL    Fracture Risk Assessment Tool  Link to Frax Calculator  Use the information below to complete the Frax calculator  : 1970  Sex: female  Weight (kg): 71.7 kg (actual weight)  Height (cm): 160 cm  Previous Fragility Fracture:  No  History of parent with fractured hip:  No  Current Smoking:  No  Patient has been on glucocorticoids for more than 3 months (5mg/day or more): No  Rheumatoid Arthritis on Problem List:  No  Secondary Osteoporosis on Problem List:  No  Consumes 3 or more units of alcohol per day: No  Femoral Neck BMD (g/cm2)Reviewed and updated as needed this visit by Provider                     Past Medical History:   Diagnosis Date    Abnormal Pap smear, can't excl hi gd sq intraepithelial lesion (ASC-H) 2013    colp neg    Bleeding gastric ulcer      from taking aspirin at age 27 - couldn't tell whether resolved     Coxsackie virus infection - severe - summer of  - hands and feet totally peeled and oral lesions very painful 9/10/2016    Hypothyroidism     after first pregnancy     Mastodynia- left breast- resolved  10/4/2013    Papanicolaou smear of cervix with atypical squamous cells of undetermined significance (ASC-US) 2015     neg HPV, needs colp    Post op infection     s/p her appy as a child     Postpartum septic endometritis     fr.  prolonged rupture    RLQ abdominal pain     when getting up from sitting in area of appy     Past Surgical History:   Procedure Laterality Date    COLONOSCOPY N/A 3/23/2022    Procedure: COLONOSCOPY, FLEXIBLE, WITH polypectomies USING hot SNARE and cold jumbo forceps;  Surgeon: Eric Aguilar MD;  Location:  GI    COLONOSCOPY N/A 3/23/2022    Procedure: COLONOSCOPY, WITH POLYPECTOMY AND BIOPSY;  Surgeon: Eric Aguilar MD;  Location:  GI    ZZ APPENDECTOMY  1984     OB History    Para Term  AB Living   2 2 2 0 0 2   SAB IAB Ectopic Multiple Live Births   0 0 0 0 0      # Outcome Date GA Lbr Rudi/2nd Weight Sex Type Anes PTL Lv   2 Term               Birth Comments:    1 Term               Birth Comments: prolonged ROM- postpartum endometritis -       Obstetric Comments   First pregnancy - Moiz - developed chorioamnionitis & postpartum endometritis despite IV abx from prolonged rupture = 30 hrs.  Also had partially retained placenta that she passed on her own 3 days s/p delivery.       No problems with second = Sherie.     Lab work is in process  Labs reviewed in EPIC  BP Readings from Last 3 Encounters:   24 100/70   22 112/78   22 102/70    Wt Readings from Last 3 Encounters:    05/30/24 71.7 kg (158 lb)   12/21/22 71.2 kg (157 lb)   02/19/22 65.8 kg (145 lb)                  Patient Active Problem List   Diagnosis    Hypothyroidism, unspecified type    Rosacea    Hyperlipidemia LDL goal <130    RLQ abdominal pain - when getting up from sitting in area of appy    Atypical squamous cells cannot exclude high grade squamous intraepithelial lesion (ASC-H) on Papanicolao smear    Compound nevus of neck - with dysplastic features - right neck 11/2013    ASCUS pap -  7/2013 - saw Dr. Beltran ob/gyn [place]     History of dysplastic nevus    Cervicalgia    Gas bloat syndrome    Overweight (BMI 25.0-29.9)    Food sensitivity with gastrointestinal symptoms- plants in the nightshade family - tomatoes, potatoes, eggplant, tomatillos , peppers     Multiple pigmented nevi    Painful lumpy left breast- resolved     Tubular adenoma of colon - x 2 on colonoscopy 3/23/2022 - required extra anesthesia = extra nausea & vomiting - may need general anesthesia in 2027 for next colonoscopy     Dyspnea on exertion-  worse since covid-19 3/2022     Viral warts, unspecified type- right mid neck    Breast pain, left- during exam pt mentioned left breast tenderness and there was some mild firmness noted from 12:00 to 3:00      Past Surgical History:   Procedure Laterality Date    COLONOSCOPY N/A 3/23/2022    Procedure: COLONOSCOPY, FLEXIBLE, WITH polypectomies USING hot SNARE and cold jumbo forceps;  Surgeon: Eric Aguilar MD;  Location:  GI    COLONOSCOPY N/A 3/23/2022    Procedure: COLONOSCOPY, WITH POLYPECTOMY AND BIOPSY;  Surgeon: Eric Aguilar MD;  Location:  GI    Z APPENDECTOMY  1984       Social History     Tobacco Use    Smoking status: Never    Smokeless tobacco: Never   Substance Use Topics    Alcohol use: Never     Alcohol/week: 0.0 standard drinks of alcohol     Family History   Problem Relation Age of Onset    Hypertension Mother     C.A.D. Father         on blood thinners for his PAD     "Cardiovascular Father         peripheral vascular disease- very heavy smoker     Lung Cancer Father     Hypertension Maternal Grandmother     Diabetes Paternal Grandmother          of complications fr. diabetes    Breast Cancer Other         paternal great aunt    Colon Cancer No family hx of     Glaucoma No family hx of     Macular Degeneration No family hx of          Current Outpatient Medications   Medication Sig Dispense Refill    levothyroxine (SYNTHROID/LEVOTHROID) 137 MCG tablet Take 1 tablet (137 mcg) by mouth daily 90 tablet 3    triamcinolone (KENALOG) 0.025 % external ointment Apply topically 2 times daily as needed (itchy rashes on the body) 454 g 1     No Known Allergies  Recent Labs   Lab Test 22  0915 22  1609 21  0834 21  0834 21  0836 21  0836 20  0916 19  1442 19  0922   *  --   --  111* 139*   < > 155*  --  113*   HDL 42*  --   --  45* 39*   < > 31*  --  34*   TRIG 118  --   --  74 109   < > 126  --  116   ALT 32  --   --  18 25  --  43  --  23   CR 0.73  --   --  0.72  --   --  0.84  --  0.69   GFRESTIMATED >90  --   --  >90  --   --  81  --  >90   GFRESTBLACK  --   --   --   --   --   --  >90  --  >90   POTASSIUM 4.9  --   --  4.3  --   --  4.7  --  4.1   TSH 0.30 0.80   < > 0.46 0.74   < > 3.27   < > 0.31*    < > = values in this interval not displayed.          Review of Systems  Constitutional, HEENT, cardiovascular, pulmonary, GI, , musculoskeletal, neuro, skin, endocrine and psych systems are negative, except as otherwise noted.     Objective    Exam  /70   Pulse 79   Temp 97.8  F (36.6  C)   Resp 16   Ht 1.6 m (5' 3\")   Wt 71.7 kg (158 lb)   LMP 2023   SpO2 99%   BMI 27.99 kg/m     Estimated body mass index is 27.99 kg/m  as calculated from the following:    Height as of this encounter: 1.6 m (5' 3\").    Weight as of this encounter: 71.7 kg (158 lb).    Physical Exam:   GENERAL: alert and no " distress  EYES: Eyes grossly normal to inspection, PERRL and conjunctivae and sclerae normal  HENT: ear canals and TM's normal, nose and mouth without ulcers or lesions  NECK: no adenopathy, no asymmetry, masses, or scars  RESP: lungs clear to auscultation - no rales, rhonchi or wheezes  CV: regular rate and rhythm, normal S1 S2, no S3 or S4, no murmur, click or rub, no peripheral edema  ABDOMEN: soft, nontender, no hepatosplenomegaly, no masses and bowel sounds normal   (female) w/bimanual: normal female external genitalia, normal urethral meatus, normal vaginal mucosa, and normal cervix/adnexa/uterus without masses or discharge  MS: no gross musculoskeletal defects noted, no edema  SKIN: no suspicious lesions or rashes  NEURO: Normal strength and tone, mentation intact and speech normal  PSYCH: mentation appears normal, affect normal/bright        Signed Electronically by: Mary Langston MD

## 2024-05-31 ENCOUNTER — LAB (OUTPATIENT)
Dept: LAB | Facility: CLINIC | Age: 54
End: 2024-05-31
Payer: COMMERCIAL

## 2024-05-31 DIAGNOSIS — Z13.6 CARDIOVASCULAR SCREENING; LDL GOAL LESS THAN 130: ICD-10-CM

## 2024-05-31 DIAGNOSIS — E78.5 HYPERLIPIDEMIA LDL GOAL <130: ICD-10-CM

## 2024-05-31 DIAGNOSIS — E03.9 HYPOTHYROIDISM, UNSPECIFIED TYPE: ICD-10-CM

## 2024-05-31 LAB
ERYTHROCYTE [DISTWIDTH] IN BLOOD BY AUTOMATED COUNT: 12.5 % (ref 10–15)
HCT VFR BLD AUTO: 40.4 % (ref 35–47)
HGB BLD-MCNC: 13.7 G/DL (ref 11.7–15.7)
HPV HR 12 DNA CVX QL NAA+PROBE: NEGATIVE
HPV16 DNA CVX QL NAA+PROBE: NEGATIVE
HPV18 DNA CVX QL NAA+PROBE: NEGATIVE
HUMAN PAPILLOMA VIRUS FINAL DIAGNOSIS: NORMAL
MCH RBC QN AUTO: 31.3 PG (ref 26.5–33)
MCHC RBC AUTO-ENTMCNC: 33.9 G/DL (ref 31.5–36.5)
MCV RBC AUTO: 92 FL (ref 78–100)
PLATELET # BLD AUTO: 327 10E3/UL (ref 150–450)
RBC # BLD AUTO: 4.38 10E6/UL (ref 3.8–5.2)
WBC # BLD AUTO: 8.1 10E3/UL (ref 4–11)

## 2024-05-31 PROCEDURE — 84443 ASSAY THYROID STIM HORMONE: CPT

## 2024-05-31 PROCEDURE — 80053 COMPREHEN METABOLIC PANEL: CPT

## 2024-05-31 PROCEDURE — 84439 ASSAY OF FREE THYROXINE: CPT

## 2024-05-31 PROCEDURE — 84480 ASSAY TRIIODOTHYRONINE (T3): CPT

## 2024-05-31 PROCEDURE — 80061 LIPID PANEL: CPT

## 2024-05-31 PROCEDURE — 85027 COMPLETE CBC AUTOMATED: CPT

## 2024-05-31 PROCEDURE — 36415 COLL VENOUS BLD VENIPUNCTURE: CPT

## 2024-06-01 LAB
ALBUMIN SERPL BCG-MCNC: 4.3 G/DL (ref 3.5–5.2)
ALP SERPL-CCNC: 73 U/L (ref 40–150)
ALT SERPL W P-5'-P-CCNC: 23 U/L (ref 0–50)
ANION GAP SERPL CALCULATED.3IONS-SCNC: 10 MMOL/L (ref 7–15)
AST SERPL W P-5'-P-CCNC: 19 U/L (ref 0–45)
BILIRUB SERPL-MCNC: 0.3 MG/DL
BUN SERPL-MCNC: 15.4 MG/DL (ref 6–20)
CALCIUM SERPL-MCNC: 9.6 MG/DL (ref 8.6–10)
CHLORIDE SERPL-SCNC: 105 MMOL/L (ref 98–107)
CHOLEST SERPL-MCNC: 228 MG/DL
CREAT SERPL-MCNC: 0.83 MG/DL (ref 0.51–0.95)
DEPRECATED HCO3 PLAS-SCNC: 25 MMOL/L (ref 22–29)
EGFRCR SERPLBLD CKD-EPI 2021: 84 ML/MIN/1.73M2
FASTING STATUS PATIENT QL REPORTED: YES
FASTING STATUS PATIENT QL REPORTED: YES
GLUCOSE SERPL-MCNC: 93 MG/DL (ref 70–99)
HDLC SERPL-MCNC: 38 MG/DL
LDLC SERPL CALC-MCNC: 171 MG/DL
NONHDLC SERPL-MCNC: 190 MG/DL
POTASSIUM SERPL-SCNC: 5 MMOL/L (ref 3.4–5.3)
PROT SERPL-MCNC: 7.4 G/DL (ref 6.4–8.3)
SODIUM SERPL-SCNC: 140 MMOL/L (ref 135–145)
T3 SERPL-MCNC: 98 NG/DL (ref 85–202)
T4 FREE SERPL-MCNC: 1.72 NG/DL (ref 0.9–1.7)
TRIGL SERPL-MCNC: 97 MG/DL
TSH SERPL DL<=0.005 MIU/L-ACNC: 0.07 UIU/ML (ref 0.3–4.2)

## 2024-06-03 ENCOUNTER — TELEPHONE (OUTPATIENT)
Dept: FAMILY MEDICINE | Facility: CLINIC | Age: 54
End: 2024-06-03
Payer: COMMERCIAL

## 2024-06-03 NOTE — TELEPHONE ENCOUNTER
Patient calling about 5/31 Results     Advised pcp will write a summary and recommendations that will post as a result note in mychart.       Patient states she does not want to wait a couple weeks to hear back, she would like to hear back in a couple of days.     She is especially concerned about her thyroid  and cholesterol - dicussed low saturated fat and low carbohydrate diet and regular exercise.     Explained to call back if she does not hear back within a few days.     Routing to pcp for review.

## 2024-06-04 PROBLEM — N95.1 SYMPTOMATIC MENOPAUSAL OR FEMALE CLIMACTERIC STATES: Status: ACTIVE | Noted: 2024-06-04

## 2024-06-04 RX ORDER — LEVOTHYROXINE SODIUM 125 UG/1
125 TABLET ORAL DAILY
Qty: 90 TABLET | Refills: 1 | Status: SHIPPED | OUTPATIENT
Start: 2024-06-04

## 2024-06-05 LAB
BKR LAB AP GYN ADEQUACY: NORMAL
BKR LAB AP GYN INTERPRETATION: NORMAL
BKR LAB AP LMP: NORMAL
BKR LAB AP PREVIOUS ABNORMAL: NORMAL
PATH REPORT.COMMENTS IMP SPEC: NORMAL
PATH REPORT.COMMENTS IMP SPEC: NORMAL
PATH REPORT.RELEVANT HX SPEC: NORMAL

## 2024-06-18 ENCOUNTER — OFFICE VISIT (OUTPATIENT)
Dept: FAMILY MEDICINE | Facility: CLINIC | Age: 54
End: 2024-06-18
Attending: FAMILY MEDICINE
Payer: COMMERCIAL

## 2024-06-18 DIAGNOSIS — D22.9 MULTIPLE PIGMENTED NEVI: Primary | ICD-10-CM

## 2024-06-18 DIAGNOSIS — D18.01 CHERRY ANGIOMA: ICD-10-CM

## 2024-06-18 DIAGNOSIS — D22.4 COMPOUND NEVUS OF NECK: ICD-10-CM

## 2024-06-18 DIAGNOSIS — D22.9 MULTIPLE BENIGN NEVI: ICD-10-CM

## 2024-06-18 DIAGNOSIS — L81.4 LENTIGINES: ICD-10-CM

## 2024-06-18 DIAGNOSIS — L20.84 INTRINSIC ECZEMA: ICD-10-CM

## 2024-06-18 DIAGNOSIS — L82.1 SEBORRHEIC KERATOSIS: ICD-10-CM

## 2024-06-18 PROCEDURE — 99213 OFFICE O/P EST LOW 20 MIN: CPT | Performed by: DERMATOLOGY

## 2024-06-18 RX ORDER — TRIAMCINOLONE ACETONIDE 0.25 MG/G
OINTMENT TOPICAL 2 TIMES DAILY PRN
Qty: 80 G | Refills: 1 | Status: SHIPPED | OUTPATIENT
Start: 2024-06-18

## 2024-06-18 RX ORDER — TACROLIMUS 1 MG/G
OINTMENT TOPICAL 2 TIMES DAILY
Qty: 30 G | Refills: 3 | Status: SHIPPED | OUTPATIENT
Start: 2024-06-18

## 2024-06-18 ASSESSMENT — PAIN SCALES - GENERAL: PAINLEVEL: NO PAIN (0)

## 2024-06-18 NOTE — LETTER
6/18/2024      Mildred Tellez  [address]  [facility] [address]      Dear Colleague,    Thank you for referring your patient, Mildred Tellez, to the [facility] [place]. Please see a copy of my visit note below.    [facility] Dermatology Note  Encounter Date: Jun 18, 2024  Office Visit     Dermatology Problem List:  # Eczematous dermatitis - s/p biopsy 2/22/2022 with subacute spongiotic dermatitis with eosinophils, concern for ACD  -Triamcinolone 0.025% ointment twice daily prn, protopic  -Patch testing referral placed 6/18/24  # H/o mild DN, left abdomen, 2013.    ____________________________________________     Assessment & Plan:     # Eczematous dermatitis, chronic, active. Concern for ACD/ICD.  Did not yet complete patch testing.  - Advised daily moisturization with bland emollient.   - continue triamcinolone (KENALOG) 0.025 % external ointment, Apply topically 2 times daily as needed (itchy rashes on the body)   - Start protopic, apply topically BID to face PRN (steroid-sparing agent needed for facial use)  - sensitive skin products were recommended   - Referred to patch testing     # Benign lesions - SKs, cherry angiomas, lentigenes.  - No treatment required  - Cryotherapy deferred for another visit in about 3 months.     # Multiple benign nevi.   # h/o DN.  - Monitor for ABCDEs of melanoma   - Continue sun protection - recommend SPF 30 or higher with frequent application   - Return sooner if noticing changing or symptomatic lesions      Procedures Performed:   None.    Follow-up: 3 month(s) in-person, or earlier for new or changing lesions    Staff and Scribe:     Scribe Disclosure:   CYNTHIA MARTIN, am serving as a scribe; to document services personally performed by Suri Ambrose MD-based on data collection and the provider's statements to me.      Provider Disclosure:   The documentation recorded by the scribe accurately reflects the services I  personally performed and the decisions made by me.    Suri Ambrose MD    Department of Dermatology  [facility]: Phone: [phone], Fax: [phone]  6/24/2024     ____________________________________________    CC: Skin Check (Eczema on face/2 moles on left side of neck and 1 on forehead would like removed)    HPI:  Ms. Mildred Tellez is a(n) 53 year old female who presents today as a return patient for a skin check. Last seen in dermatology by me on 8/30/22, at which time the patient was continued on TMC 0.025% for eczema and was referred for patch testing.     Today, there is a little eczema activity on her face. She also has 2 moles on the left side of her face, on her neck, and on her forehead that she would like removed.  She did not yet complete patch testing.    Patient is otherwise feeling well, without additional skin concerns.    Labs Reviewed:  N/A    Physical Exam:  Vitals: LMP 06/05/2023   SKIN: Full body skin exam excluding the genitals was performed including face, scalp, neck, ears, chest, back, bilateral arms, hands, bilateral legs, feet, and buttocks.   - There are dome shaped bright red papules on the trunk and extremities .   - Multiple regular brown pigmented macules and papules are identified on the trunk and extremities.  - Scattered brown macules on sun exposed areas.  - Waxy stuck on papules and plaques on trunk and extremities.   - ill defined erythema on R medial cheek   - No other lesions of concern on areas examined.     Medications:  Current Outpatient Medications   Medication Sig Dispense Refill     levothyroxine (SYNTHROID/LEVOTHROID) 125 MCG tablet Take 1 tablet (125 mcg) by mouth daily 90 tablet 1     triamcinolone (KENALOG) 0.025 % external ointment Apply topically 2 times daily as needed (itchy rashes on the body) 454 g 1     eflornithine HCl 13.9 % CREA Apply 1/2 inch to facial  hair areas nightly (Patient not taking: Reported on 6/18/2024) 45 g 3     No current facility-administered medications for this visit.      Past Medical History:   Patient Active Problem List   Diagnosis     Hypothyroidism, unspecified type     Rosacea     Hyperlipidemia LDL goal <130     RLQ abdominal pain - when getting up from sitting in area of appy     Atypical squamous cells cannot exclude high grade squamous intraepithelial lesion (ASC-H) on Papanicolao smear     Compound nevus of neck - with dysplastic features - right neck 11/2013     ASCUS pap -  7/2013 - saw Dr. Beltran ob/gyn [place]      History of dysplastic nevus     Cervicalgia     Gas bloat syndrome     Overweight (BMI 25.0-29.9)     Food sensitivity with gastrointestinal symptoms- plants in the nightshade family - tomatoes, potatoes, eggplant, tomatillos , peppers      Multiple pigmented nevi     Painful lumpy left breast- resolved      Tubular adenoma of colon - x 2 on colonoscopy 3/23/2022 - required extra anesthesia = extra nausea & vomiting - may need general anesthesia in 2027 for next colonoscopy      Dyspnea on exertion-  worse since covid-19 3/2022      Viral warts, unspecified type- right mid neck     Breast pain, left- during exam pt mentioned left breast tenderness and there was some mild firmness noted from 12:00 to 3:00      CARDIOVASCULAR SCREENING; LDL GOAL LESS THAN 130     Symptomatic menopausal or female climacteric states- with vasomotor symptoms, mood swings, weight gain & increased facial hair - mom with same - pt thinking about HRT     Past Medical History:   Diagnosis Date     Abnormal Pap smear, can't excl hi gd sq intraepithelial lesion (ASC-H) 7/2013    colp neg     Bleeding gastric ulcer 1997     from taking aspirin at age 27 - couldn't tell whether resolved      Coxsackie virus infection - severe - summer of 2016 - hands and feet totally peeled and oral lesions very painful 9/10/2016     Hypothyroidism     after first  pregnancy      Mastodynia- left breast- resolved  10/4/2013     Papanicolaou smear of cervix with atypical squamous cells of undetermined significance (ASC-US) 7/2015     neg HPV, needs colp     Post op infection     s/p her appy as a child      Postpartum septic endometritis     fr.  prolonged rupture     RLQ abdominal pain     when getting up from sitting in area of appy        CC Mary Langston MD  [address] [place] [address] on close of this encounter.      Again, thank you for allowing me to participate in the care of your patient.        Sincerely,        Suri Ambrose MD

## 2024-06-18 NOTE — PATIENT INSTRUCTIONS
Patient Education       Proper skin care from [facility]:    -Eliminate harsh soaps as they strip the natural oils from the skin, often resulting in dry itchy skin ( i.e. Dial, Zest, [place] Spring)  -Use mild soaps such as Cetaphil or Dove Sensitive Skin in the shower. You do not need to use soap on arms, legs, and trunk every time you shower unless visibly soiled.   -Avoid hot or cold showers.  -After showering, lightly dry off and apply moisturizing within 2-3 minutes. This will help trap moisture in the skin.   -Aggressive use of a moisturizer at least 1-2 times a day to the entire body (including -Vanicream, Cetaphil, Aquaphor or Cerave) and moisturize hands after every washing.  -We recommend using moisturizers that come in a tub that needs to be scooped out, not a pump. This has more of an oil base. It will hold moisture in your skin much better than a water base moisturizer. The above recommended are non-pore clogging.      Wear a sunscreen with at least SPF 30 on your face, ears, neck and V of the chest daily. Wear sunscreen on other areas of the body if those areas are exposed to the sun throughout the day. Sunscreens can contain physical and/or chemical blockers. Physical blockers are less likely to clog pores, these include zinc oxide and titanium dioxide. Reapply every two hour and after swimming.     Sunscreen examples: https://www.ewg.org/sunscreen/    UV radiation  UVA radiation remains constant throughout the day and throughout the year. It is a longer wavelength than UVB and therefore penetrates deeper into the skin leading to immediate and delayed tanning, photoaging, and skin cancer. 70-80% of UVA and UVB radiation occurs between the hours of 10am-2pm.  UVB radiation  UVB radiation causes the most harmful effects and is more significant during the summer months. However, snow and ice can reflect UVB radiation leading to skin damage during the winter months as well. UVB radiation is  responsible for tanning, burning, inflammation, delayed erythema (pinkness), pigmentation (brown spots), and skin cancer.     I recommend self monthly full body exams and yearly full body exams with a dermatology provider. If you develop a new or changing lesion please follow up for examination. Most skin cancers are pink and scaly or pink and pearly. However, we do see blue/brown/black skin cancers.  Consider the ABCDEs of melanoma when giving yourself your monthly full body exam ( don't forget the groin, buttocks, feet, toes, etc). A-asymmetry, B-borders, C-color, D-diameter, E-elevation or evolving. If you see any of these changes please follow up in clinic. If you cannot see your back I recommend purchasing a hand held mirror to use with a larger wall mirror.       Checking for Skin Cancer  You can find cancer early by checking your skin each month. There are 3 kinds of skin cancer. They are melanoma, basal cell carcinoma, and squamous cell carcinoma. Doing monthly skin checks is the best way to find new marks or skin changes. Follow the instructions below for checking your skin.   The ABCDEs of checking moles for melanoma   Check your moles or growths for signs of melanoma using ABCDE:   Asymmetry: the sides of the mole or growth don t match  Border: the edges are ragged, notched, or blurred  Color: the color within the mole or growth varies  Diameter: the mole or growth is larger than 6 mm (size of a pencil eraser)  Evolving: the size, shape, or color of the mole or growth is changing (evolving is not shown in the images below)    Checking for other types of skin cancer  Basal cell carcinoma or squamous cell carcinoma have symptoms such as:     A spot or mole that looks different from all other marks on your skin  Changes in how an area feels, such as itching, tenderness, or pain  Changes in the skin's surface, such as oozing, bleeding, or scaliness  A sore that does not heal  New swelling or redness beyond  the border of a mole    Who s at risk?  Anyone can get skin cancer. But you are at greater risk if you have:   Fair skin, light-colored hair, or light-colored eyes  Many moles or abnormal moles on your skin  A history of sunburns from sunlight or tanning beds  A family history of skin cancer  A history of exposure to radiation or chemicals  A weakened immune system  If you have had skin cancer in the past, you are at risk for recurring skin cancer.   How to check your skin  Do your monthly skin checkups in front of a full-length mirror. Check all parts of your body, including your:   Head (ears, face, neck, and scalp)  Torso (front, back, and sides)  Arms (tops, undersides, upper, and lower armpits)  Hands (palms, backs, and fingers, including under the nails)  Buttocks and genitals  Legs (front, back, and sides)  Feet (tops, soles, toes, including under the nails, and between toes)  If you have a lot of moles, take digital photos of them each month. Make sure to take photos both up close and from a distance. These can help you see if any moles change over time.   Most skin changes are not cancer. But if you see any changes in your skin, call your doctor right away. Only he or she can diagnose a problem. If you have skin cancer, seeing your doctor can be the first step toward getting the treatment that could save your life.   [employer] last reviewed this educational content on 4/1/2019 2000-2020 [employer]. [address] [place] [address], [place] [address]. All rights reserved. This information is not intended as a substitute for professional medical care. Always follow your healthcare professional's instructions.       When should I call my doctor?  If you are worsening or not improving, please, contact us or seek urgent care as noted below.     Who should I call with questions (adults)?  [facility] (adult and pediatric): [phone]  [facility]  [place] (adult): [phone]  [facility] [place] ([place], [place], [place] and [place]) [phone]  For urgent needs outside of business hours call the [facility] at [phone] and ask for the dermatology resident on call to be paged  If this is a medical emergency and you are unable to reach an ER, Call 911      If you need a prescription refill, please contact your pharmacy. Refills are approved or denied by our Physicians during normal business hours, Monday through Fridays  Per office policy, refills will not be granted if you have not been seen within the past year (or sooner depending on your child's condition)

## 2024-06-18 NOTE — PROGRESS NOTES
[facility] Dermatology Note  Encounter Date: Jun 18, 2024  Office Visit     Dermatology Problem List:  # Eczematous dermatitis - s/p biopsy 2/22/2022 with subacute spongiotic dermatitis with eosinophils, concern for ACD  -Triamcinolone 0.025% ointment twice daily prn, protopic  -Patch testing referral placed 6/18/24  # H/o mild DN, left abdomen, 2013.    ____________________________________________     Assessment & Plan:     # Eczematous dermatitis, chronic, active. Concern for ACD/ICD.  Did not yet complete patch testing.  - Advised daily moisturization with bland emollient.   - continue triamcinolone (KENALOG) 0.025 % external ointment, Apply topically 2 times daily as needed (itchy rashes on the body)   - Start protopic, apply topically BID to face PRN (steroid-sparing agent needed for facial use)  - sensitive skin products were recommended   - Referred to patch testing     # Benign lesions - SKs, cherry angiomas, lentigenes.  - No treatment required  - Cryotherapy deferred for another visit in about 3 months.     # Multiple benign nevi.   # h/o DN.  - Monitor for ABCDEs of melanoma   - Continue sun protection - recommend SPF 30 or higher with frequent application   - Return sooner if noticing changing or symptomatic lesions      Procedures Performed:   None.    Follow-up: 3 month(s) in-person, or earlier for new or changing lesions    Staff and Scribe:     Scribe Disclosure:   ICYNTHIA, am serving as a scribe; to document services personally performed by Suri Ambrose MD-based on data collection and the provider's statements to me.      Provider Disclosure:   The documentation recorded by the scribe accurately reflects the services I personally performed and the decisions made by me.    Suri Ambrose MD    Department of Dermatology  [facility]: Phone: [phone], Fax:  [phone]  6/24/2024     ____________________________________________    CC: Skin Check (Eczema on face/2 moles on left side of neck and 1 on forehead would like removed)    HPI:  Ms. Mildred Tellez is a(n) 53 year old female who presents today as a return patient for a skin check. Last seen in dermatology by me on 8/30/22, at which time the patient was continued on TMC 0.025% for eczema and was referred for patch testing.     Today, there is a little eczema activity on her face. She also has 2 moles on the left side of her face, on her neck, and on her forehead that she would like removed.  She did not yet complete patch testing.    Patient is otherwise feeling well, without additional skin concerns.    Labs Reviewed:  N/A    Physical Exam:  Vitals: [facility] 06/05/2023   SKIN: Full body skin exam excluding the genitals was performed including face, scalp, neck, ears, chest, back, bilateral arms, hands, bilateral legs, feet, and buttocks.   - There are dome shaped bright red papules on the trunk and extremities .   - Multiple regular brown pigmented macules and papules are identified on the trunk and extremities.  - Scattered brown macules on sun exposed areas.  - Waxy stuck on papules and plaques on trunk and extremities.   - ill defined erythema on R medial cheek   - No other lesions of concern on areas examined.     Medications:  Current Outpatient Medications   Medication Sig Dispense Refill    levothyroxine (SYNTHROID/LEVOTHROID) 125 MCG tablet Take 1 tablet (125 mcg) by mouth daily 90 tablet 1    triamcinolone (KENALOG) 0.025 % external ointment Apply topically 2 times daily as needed (itchy rashes on the body) 454 g 1    eflornithine HCl 13.9 % CREA Apply 1/2 inch to facial hair areas nightly (Patient not taking: Reported on 6/18/2024) 45 g 3     No current facility-administered medications for this visit.      Past Medical History:   Patient Active Problem List   Diagnosis    Hypothyroidism, unspecified type     Rosacea    Hyperlipidemia LDL goal <130    RLQ abdominal pain - when getting up from sitting in area of appy    Atypical squamous cells cannot exclude high grade squamous intraepithelial lesion (ASC-H) on Papanicolao smear    Compound nevus of neck - with dysplastic features - right neck 11/2013    ASCUS pap -  7/2013 - saw Dr. Beltran ob/gyn [place]     History of dysplastic nevus    Cervicalgia    Gas bloat syndrome    Overweight (BMI 25.0-29.9)    Food sensitivity with gastrointestinal symptoms- plants in the nightshade family - tomatoes, potatoes, eggplant, tomatillos , peppers     Multiple pigmented nevi    Painful lumpy left breast- resolved     Tubular adenoma of colon - x 2 on colonoscopy 3/23/2022 - required extra anesthesia = extra nausea & vomiting - may need general anesthesia in 2027 for next colonoscopy     Dyspnea on exertion-  worse since covid-19 3/2022     Viral warts, unspecified type- right mid neck    Breast pain, left- during exam pt mentioned left breast tenderness and there was some mild firmness noted from 12:00 to 3:00     CARDIOVASCULAR SCREENING; LDL GOAL LESS THAN 130    Symptomatic menopausal or female climacteric states- with vasomotor symptoms, mood swings, weight gain & increased facial hair - mom with same - pt thinking about HRT     Past Medical History:   Diagnosis Date    Abnormal Pap smear, can't excl hi gd sq intraepithelial lesion (ASC-H) 7/2013    colp neg    Bleeding gastric ulcer 1997     from taking aspirin at age 27 - couldn't tell whether resolved     Coxsackie virus infection - severe - summer of 2016 - hands and feet totally peeled and oral lesions very painful 9/10/2016    Hypothyroidism     after first pregnancy     Mastodynia- left breast- resolved  10/4/2013    Papanicolaou smear of cervix with atypical squamous cells of undetermined significance (ASC-US) 7/2015     neg HPV, needs colp    Post op infection     s/p her appy as a child     Postpartum septic  endometritis     fr.  prolonged rupture    RLQ abdominal pain     when getting up from sitting in area of appy        CC Mary Langston MD  [address] [place] [address] on close of this encounter.

## 2024-07-16 ENCOUNTER — OFFICE VISIT (OUTPATIENT)
Dept: FAMILY MEDICINE | Facility: CLINIC | Age: 54
End: 2024-07-16
Payer: COMMERCIAL

## 2024-07-16 VITALS
BODY MASS INDEX: 27.82 KG/M2 | SYSTOLIC BLOOD PRESSURE: 114 MMHG | WEIGHT: 157 LBS | TEMPERATURE: 98.8 F | OXYGEN SATURATION: 98 % | DIASTOLIC BLOOD PRESSURE: 66 MMHG | RESPIRATION RATE: 16 BRPM | HEART RATE: 84 BPM | HEIGHT: 63 IN

## 2024-07-16 DIAGNOSIS — R51.9 RIGHT SIDED FACIAL PAIN: Primary | ICD-10-CM

## 2024-07-16 PROCEDURE — 99214 OFFICE O/P EST MOD 30 MIN: CPT | Performed by: FAMILY MEDICINE

## 2024-07-16 ASSESSMENT — PAIN SCALES - GENERAL: PAINLEVEL: MODERATE PAIN (5)

## 2024-07-16 NOTE — PROGRESS NOTES
Assessment & Plan     Right sided facial pain  No palpable swelling on right side of face but does have some residual tenderness over the parotid gland and submandibular area. No visualized pus from stensons duct. Vitals stable. ? Sialolithiasis. Will check ultrasound for further evaluation. Rx sent for Augmentin should she notice more swelling or develop fevers - should also call if this occurs.  - US Head Neck Soft Tissue; Future  - amoxicillin-clavulanate (AUGMENTIN) 875-125 MG tablet; Take 1 tablet by mouth 2 times daily for 10 days        Subjective   Mildred is a 53 year old, presenting for the following health issues:  Facial Pain (Concerns with pain on the right side of her face, swollen, feels inside her cheek, pain goes into her teeth, trouble opening her mouth wide,  yesterday her right ear hurt  x couple days. Concerned that she feels weak, dizzy at times, feels like she is in a slight fog, is just not feeling herself.  No headaches. )        7/16/2024    12:52 PM   Additional Questions   Roomed by Dulce Garcia CMA   Accompanied by Self     History of Present Illness       Reason for visit:  Pain and swollance in right part of the face  Symptom onset:  3-7 days ago  Symptom intensity:  Severe  Symptom progression:  Worsening  Had these symptoms before:  No  What makes it worse:  Not sure  What makes it better:  Not sure    She eats 4 or more servings of fruits and vegetables daily.She consumes 0 sweetened beverage(s) daily.She exercises with enough effort to increase her heart rate 30 to 60 minutes per day.  She exercises with enough effort to increase her heart rate 5 days per week.   She is taking medications regularly.     Symptoms started on Friday. She was eating something chewy and then flossed and then started noticing  pain afterwards. On Saturday, was pushing on her right cheek and top and bottom teeth on right side were painful. At night would swish caldendula tea which did seem to help a  "little bit and allowed her to sleep. Pain is worse when she opens her mouth.  Pain is primarily in the cheek now just anterior to her ear. No fevers but she has had some chills.           Review of Systems  Constitutional, HEENT, cardiovascular, pulmonary, gi and gu systems are negative, except as otherwise noted.      Objective    /66 (BP Location: Right arm, Patient Position: Sitting, Cuff Size: Adult Regular)   Pulse 84   Temp 98.8  F (37.1  C) (Oral)   Resp 16   Ht 1.6 m (5' 3\")   Wt 71.2 kg (157 lb)   LMP 06/05/2023   SpO2 98%   BMI 27.81 kg/m    Body mass index is 27.81 kg/m .  Physical Exam   GENERAL: alert and no distress  HENT: normal cephalic/atraumatic, ear canals and TM's normal, nose and mouth without ulcers or lesions, oropharynx clear, and oral mucous membranes moist; some tenderness when palpating parotid gland -- clear saliva visualized from stensons duct  NECK: no adenopathy, no asymmetry, masses, or scars  PSYCH: mentation appears normal, affect normal/bright          Signed Electronically by: Perry Goldsmith,   "

## 2024-07-17 ENCOUNTER — HOSPITAL ENCOUNTER (OUTPATIENT)
Dept: ULTRASOUND IMAGING | Facility: CLINIC | Age: 54
Discharge: HOME | End: 2024-07-17
Attending: FAMILY MEDICINE | Admitting: FAMILY MEDICINE
Payer: COMMERCIAL

## 2024-07-17 DIAGNOSIS — R51.9 RIGHT SIDED FACIAL PAIN: ICD-10-CM

## 2024-07-17 PROCEDURE — 76536 US EXAM OF HEAD AND NECK: CPT

## 2024-07-18 ENCOUNTER — ANCILLARY PROCEDURE (OUTPATIENT)
Dept: BONE DENSITY | Facility: CLINIC | Age: 54
End: 2024-07-18
Attending: FAMILY MEDICINE
Payer: COMMERCIAL

## 2024-07-18 DIAGNOSIS — Z13.820 SCREENING FOR OSTEOPOROSIS: ICD-10-CM

## 2024-07-18 PROCEDURE — 77080 DXA BONE DENSITY AXIAL: CPT | Mod: TC | Performed by: RADIOLOGY

## 2024-09-06 ENCOUNTER — MYC MEDICAL ADVICE (OUTPATIENT)
Dept: OTOLARYNGOLOGY | Facility: CLINIC | Age: 54
End: 2024-09-06
Payer: COMMERCIAL

## 2024-09-06 NOTE — TELEPHONE ENCOUNTER
This patient needs to reschedule their appt with Dr. Diaz on Tuesday, 2/4/25 to next available that works for the patient

## 2024-09-13 ENCOUNTER — LAB (OUTPATIENT)
Dept: LAB | Facility: CLINIC | Age: 54
End: 2024-09-13
Payer: COMMERCIAL

## 2024-09-13 DIAGNOSIS — E03.9 HYPOTHYROIDISM, UNSPECIFIED TYPE: ICD-10-CM

## 2024-09-13 LAB
T3 SERPL-MCNC: 100 NG/DL (ref 85–202)
T4 FREE SERPL-MCNC: 2.01 NG/DL (ref 0.9–1.7)
TSH SERPL DL<=0.005 MIU/L-ACNC: 0.09 UIU/ML (ref 0.3–4.2)

## 2024-09-13 PROCEDURE — 84439 ASSAY OF FREE THYROXINE: CPT

## 2024-09-13 PROCEDURE — 84480 ASSAY TRIIODOTHYRONINE (T3): CPT

## 2024-09-13 PROCEDURE — 84443 ASSAY THYROID STIM HORMONE: CPT

## 2024-09-13 PROCEDURE — 36415 COLL VENOUS BLD VENIPUNCTURE: CPT

## 2024-09-17 ENCOUNTER — OFFICE VISIT (OUTPATIENT)
Dept: DERMATOLOGY | Facility: CLINIC | Age: 54
End: 2024-09-17
Payer: COMMERCIAL

## 2024-09-17 DIAGNOSIS — L82.1 SK (SEBORRHEIC KERATOSIS): ICD-10-CM

## 2024-09-17 DIAGNOSIS — L91.8 SKIN TAG: ICD-10-CM

## 2024-09-17 DIAGNOSIS — L81.4 LENTIGINES: ICD-10-CM

## 2024-09-17 DIAGNOSIS — D18.01 CHERRY ANGIOMA: ICD-10-CM

## 2024-09-17 DIAGNOSIS — D49.2 NEOPLASM OF SKIN: ICD-10-CM

## 2024-09-17 DIAGNOSIS — L82.0 INFLAMED SEBORRHEIC KERATOSIS: ICD-10-CM

## 2024-09-17 DIAGNOSIS — D22.9 MULTIPLE BENIGN NEVI: Primary | ICD-10-CM

## 2024-09-17 PROCEDURE — 99214 OFFICE O/P EST MOD 30 MIN: CPT | Mod: 25 | Performed by: DERMATOLOGY

## 2024-09-17 PROCEDURE — 11102 TANGNTL BX SKIN SINGLE LES: CPT | Mod: XS | Performed by: DERMATOLOGY

## 2024-09-17 PROCEDURE — 11103 TANGNTL BX SKIN EA SEP/ADDL: CPT | Mod: XU | Performed by: DERMATOLOGY

## 2024-09-17 PROCEDURE — 88305 TISSUE EXAM BY PATHOLOGIST: CPT | Performed by: DERMATOLOGY

## 2024-09-17 PROCEDURE — 17110 DESTRUCTION B9 LES UP TO 14: CPT | Performed by: DERMATOLOGY

## 2024-09-17 RX ORDER — CHLORHEXIDINE GLUCONATE ORAL RINSE 1.2 MG/ML
SOLUTION DENTAL
COMMUNITY
Start: 2024-08-28

## 2024-09-17 NOTE — PROGRESS NOTES
[facility] Dermatology Note  Encounter Date: Sep 17, 2024  Office Visit     Dermatology Problem List:    #NUB x 3, S/p Biopsy performed on 9/17/24: pending results   1. Eczematous dermatitis - s/p biopsy 2/22/2022 with subacute spongiotic dermatitis with eosinophils, concern for ACD  -Triamcinolone 0.025% ointment twice daily prn, protopic  -Patch testing referral pending  2.  H/o mild DN, left abdomen, 2013.    Last FBSE: 6/18/24    ____________________________________________     Assessment & Plan:     #NUB L inframammary fold, medial Ddx: symptomatic nevi nevi vs other  - Shave biopsy  performed today  - Photographed today     #NUB L inframammary fold, lateral Ddx: symptomatic vs other  - Shave biopsy  performed today  - Photographed today     #NUB L lateral flank, medial Ddx: symptomatic angioma  vs other  - Shave biopsy  performed today  - Photographed today     # Eczematous dermatitis, chronic, active. Concern for ACD/ICD.  Did not yet complete patch testing. Improves with TMC but then recurs. Had a significant reaction to a lip balm.  - continue daily moisturization with bland emollient.   - continue triamcinolone (KENALOG) 0.025 % external ointment, Apply topically 2 times daily as needed  -  encouraged to start using protopic, apply topically BID to face PRN (steroid-sparing agent needed for facial use)  - sensitive skin products are recommended     #iSK  # inflamed skin tags  - ddx for 2 lesions on R posterior neck and R central chest include benign nevi; advised can remove with shave bx if do not resolve with cryo  - cryotherapy performed, see procedure note below      Procedures Performed:   Cryotherapy procedure note: After verbal consent and discussion of risks and benefits including but no limited to dyspigmentation/scar, blister, and pain, 9 ISKs and inflamed skin tags was(were) treated with 1-2mm freeze border for 2 cycles with liquid nitrogen. Post cryotherapy instructions were  provided.     Shave biopsy: Location(s) see above.  After discussion of benefits and risks including but not limited to bleeding/bruising, pain/swelling, infection, scar, incomplete removal, nerve damage/numbness, recurrence, and non-diagnostic biopsy,  verbal consent and photographs were obtained. Time-out was performed. The area was cleaned with isopropyl alcohol. 0.5mL of 1% lidocaine with epinephrine was injected to obtain adequate anesthesia of each lesion. Shave biopsy was performed. Hemostasis was achieved with aluminium chloride. Vaseline and a sterile dressing were applied. The patient tolerated the procedure and no complications were noted. The patient was provided with verbal and written post care instructions.       Follow-up: 3 month(s) in-person, or earlier for new or changing lesions    Staff and Scribe:     Scribe Disclosure:   I, FABIÁN ROLON, am serving as a scribe; to document services personally performed by Suri Ambrose MD -based on data collection and the provider's statements to me.     Provider Disclosure:   The documentation recorded by the scribe accurately reflects the services I personally performed and the decisions made by me.    Suri Ambrose MD    Department of Dermatology  [facility]: Phone: [phone], Fax: [phone]  9/17/2024     ____________________________________________    CC: Skin Check ( Eczematous dermatitis /Skin tags on neck /Moles on chest and abdomen/Right thigh )    HPI:  Ms. Mildred Tellez is a(n) 53 year old female who presents today as a return patient for a skin check. Last seen in dermatology by me on 6/18/24, at which time the patient was continued on TMC 0.025% a dn started on protopic for Eczema     Today, patient reported that her eczema is stable with steroid, if she would to discontinue it would return. Has not tried use of protopic.   Several  irritating areas    Patient is otherwise feeling well, without additional skin concerns.    Labs Reviewed:  N/A    Physical Exam:  Vitals: There were no vitals taken for this visit.  SKIN: focused of below  - ill defined pink patches on lower/lateral chin  -  There is(are) skin colored pedunculated papules on the: L lateral neck x 2, anterior chest x3, R axilla x1  - Fleshy pigmented papules on the R lateral neck R chest x2  -waxy, stuck on tan/brown papules on the central chest, R thigh x2  - L inframammary fold medial there Is a brown fleshy papule  - L inframammary fold lateral there is a brown fleshy papule   - L lateral flank there is a pedunculated papule.   - No other lesions of concern on areas examined.     Medications:  Current Outpatient Medications   Medication Sig Dispense Refill    eflornithine HCl 13.9 % CREA Apply 1/2 inch to facial hair areas nightly (Patient not taking: Reported on 6/18/2024) 45 g 3    levothyroxine (SYNTHROID/LEVOTHROID) 125 MCG tablet Take 1 tablet (125 mcg) by mouth daily 90 tablet 1    tacrolimus (PROTOPIC) 0.1 % external ointment Apply topically 2 times daily To face as needed 30 g 3    triamcinolone (KENALOG) 0.025 % external ointment Apply topically 2 times daily as needed (itchy rashes on the body) 80 g 1     No current facility-administered medications for this visit.      Past Medical History:   Patient Active Problem List   Diagnosis    Hypothyroidism, unspecified type    Rosacea    Hyperlipidemia LDL goal <130    RLQ abdominal pain - when getting up from sitting in area of appy    Atypical squamous cells cannot exclude high grade squamous intraepithelial lesion (ASC-H) on Papanicolao smear    Compound nevus of neck - with dysplastic features - right neck 11/2013    ASCUS pap -  7/2013 - saw Dr. Beltran ob/gyn [place]     History of dysplastic nevus    Cervicalgia    Gas bloat syndrome    Overweight (BMI 25.0-29.9)    Food sensitivity with gastrointestinal symptoms- plants  in the nightshade family - tomatoes, potatoes, eggplant, tomatillos , peppers     Multiple pigmented nevi    Painful lumpy left breast- resolved     Tubular adenoma of colon - x 2 on colonoscopy 3/23/2022 - required extra anesthesia = extra nausea & vomiting - may need general anesthesia in 2027 for next colonoscopy     Dyspnea on exertion-  worse since covid-19 3/2022     Viral warts, unspecified type- right mid neck    Breast pain, left- during exam pt mentioned left breast tenderness and there was some mild firmness noted from 12:00 to 3:00     CARDIOVASCULAR SCREENING; LDL GOAL LESS THAN 130    Symptomatic menopausal or female climacteric states- with vasomotor symptoms, mood swings, weight gain & increased facial hair - mom with same - pt thinking about HRT     Past Medical History:   Diagnosis Date    Abnormal Pap smear, can't excl hi gd sq intraepithelial lesion (ASC-H) 7/2013    colp neg    Bleeding gastric ulcer 1997     from taking aspirin at age 27 - couldn't tell whether resolved     Coxsackie virus infection - severe - summer of 2016 - hands and feet totally peeled and oral lesions very painful 9/10/2016    Hypothyroidism     after first pregnancy     Mastodynia- left breast- resolved  10/4/2013    Papanicolaou smear of cervix with atypical squamous cells of undetermined significance (ASC-US) 7/2015     neg HPV, needs colp    Post op infection     s/p her appy as a child     Postpartum septic endometritis     fr.  prolonged rupture    RLQ abdominal pain     when getting up from sitting in area of appy        CC Mary Langston MD  [address] [place] [address] on close of this encounter.

## 2024-09-17 NOTE — PATIENT INSTRUCTIONS
Cryotherapy    What is it?  Use of a very cold liquid, such as liquid nitrogen, to freeze and destroy abnormal skin cells that need to be removed    What should I expect?  Tenderness and redness  A small blister that might grow and fill with dark purple blood. There may be crusting.  More than one treatment may be needed if the lesions do not go away.    How do I care for the treated area?  Gently wash the area with your hands when bathing.  Use a thin layer of Vaseline to help with healing. You may use a Band-Aid.   The area should heal within 7-10 days and may leave behind a pink or lighter color.   Do not use an antibiotic or Neosporin ointment.   You may take acetaminophen (Tylenol) for pain.     Call your Doctor if you have:  Severe pain  Signs of infection (warmth, redness, cloudy yellow drainage, and or a bad smell)  Questions or concerns    Who should I call with questions?      [facility]: [phone]      [facility]: [phone]      For urgent needs outside of business hours call the [facility] at [phone]        and ask for the dermatology resident on call      Wound Care After a Biopsy    What is a skin biopsy?  A skin biopsy allows the doctor to examine a very small piece of tissue under the microscope to determine the diagnosis and the best treatment for the skin condition. A local anesthetic (numbing medicine)  is injected with a very small needle into the skin area to be tested. A small piece of skin is taken from the area. Sometimes a suture (stitch) is used.     What are the risks of a skin biopsy?  I will experience scar, bleeding, swelling, pain, crusting and redness. I may experience incomplete removal or recurrence. Risks of this procedure are excessive bleeding, bruising, infection, nerve damage, numbness, thick (hypertrophic or keloidal) scar and non-diagnostic biopsy.    How should I care for my wound for the first  24 hours?  Keep the wound dry and covered for 24 hours  If it bleeds, hold direct pressure on the area for 15 minutes. If bleeding does not stop then go to the emergency room  Avoid strenuous exercise the first 1-2 days or as your doctor instructs you    How should I care for the wound after 24 hours?  After 24 hours, remove the bandage  You may bathe or shower as normal  If you had a scalp biopsy, you can shampoo as usual and can use shower water to clean the biopsy site daily  Clean the wound twice a day with gentle soap and water  Do not scrub, be gentle  Apply white petroleum/Vaseline after cleaning the wound with a cotton swab or a clean finger, and keep the site covered with a Bandaid /bandage. Bandages are not necessary with a scalp biopsy  If you are unable to cover the site with a Bandaid /bandage, re-apply ointment 2-3 times a day to keep the site moist. Moisture will help with healing  Avoid strenuous activity for first 1-2 days  Avoid lakes, rivers, pools, and oceans until the stitches are removed or the site is healed    How do I clean my wound?  Wash hands thoroughly with soap or use hand  before all wound care  Clean the wound with gentle soap and water  Apply white petroleum/Vaseline  to wound after it is clean  Replace the Bandaid /bandage to keep the wound covered for the first few days or as instructed by your doctor  If you had a scalp biopsy, warm shower water to the area on a daily basis should suffice    What should I use to clean my wound?   Cotton-tipped applicators (Qtips )  White petroleum jelly (Vaseline ). Use a clean new container and use Q-tips to apply.  Bandaids   as needed  Gentle soap     How should I care for my wound long term?  Do not get your wound dirty  Keep up with wound care for one week or until the area is healed.  A small scab will form and fall off by itself when the area is completely healed. The area will be red and will become pink in color as it heals. Sun  protection is very important for how your scar will turn out. Sunscreen with an SPF 30 or greater is recommended once the area is healed.  If you have stitches, stitches need to be removed in 10 days. You may return to our clinic for this or you may have it done locally at your doctor s office.  You should have some soreness but it should be mild and slowly go away over several days. Talk to your doctor about using tylenol for pain,    When should I call my doctor?  If you have increased:   Pain or swelling  Pus or drainage (clear or slightly yellow drainage is ok)  Temperature over 100F  Spreading redness or warmth around wound    When will I hear about my results?  The biopsy results can take 2-3 weeks to come back. The clinic will call you with the results, send you a RF nano message, or have you schedule a follow-up clinic or phone time to discuss the results. Contact our clinics if you do not hear from us in 3 weeks.     Who should I call with questions?  [facility]: [phone]   [facility]: [phone]  For urgent needs outside of business hours call the [facility] at [phone] and ask for the dermatology resident on call            Proper skin care from [facility]:    -Eliminate harsh soaps as they strip the natural oils from the skin, often resulting in dry itchy skin ( i.e. Dial, Zest, [place] Spring)  -Use mild soaps such as Cetaphil or Dove Sensitive Skin in the shower. You do not need to use soap on arms, legs, and trunk every time you shower unless visibly soiled.   -Avoid hot or cold showers.  -After showering, lightly dry off and apply moisturizing within 2-3 minutes. This will help trap moisture in the skin.   -Aggressive use of a moisturizer at least 1-2 times a day to the entire body (including -Vanicream, Cetaphil, Aquaphor or Cerave) and moisturize hands after every washing.  -We recommend using moisturizers that  come in a tub that needs to be scooped out, not a pump. This has more of an oil base. It will hold moisture in your skin much better than a water base moisturizer. The above recommended are non-pore clogging.      Wear a sunscreen with at least SPF 30 on your face, ears, neck and V of the chest daily. Wear sunscreen on other areas of the body if those areas are exposed to the sun throughout the day. Sunscreens can contain physical and/or chemical blockers. Physical blockers are less likely to clog pores, these include zinc oxide and titanium dioxide. Reapply every two hour and after swimming.     Sunscreen examples: https://www.ewg.org/sunscreen/    UV radiation  UVA radiation remains constant throughout the day and throughout the year. It is a longer wavelength than UVB and therefore penetrates deeper into the skin leading to immediate and delayed tanning, photoaging, and skin cancer. 70-80% of UVA and UVB radiation occurs between the hours of 10am-2pm.  UVB radiation  UVB radiation causes the most harmful effects and is more significant during the summer months. However, snow and ice can reflect UVB radiation leading to skin damage during the winter months as well. UVB radiation is responsible for tanning, burning, inflammation, delayed erythema (pinkness), pigmentation (brown spots), and skin cancer.     I recommend self monthly full body exams and yearly full body exams with a dermatology provider. If you develop a new or changing lesion please follow up for examination. Most skin cancers are pink and scaly or pink and pearly. However, we do see blue/brown/black skin cancers.  Consider the ABCDEs of melanoma when giving yourself your monthly full body exam ( don't forget the groin, buttocks, feet, toes, etc). A-asymmetry, B-borders, C-color, D-diameter, E-elevation or evolving. If you see any of these changes please follow up in clinic. If you cannot see your back I recommend purchasing a hand held mirror to use  with a larger wall mirror.       Checking for Skin Cancer  You can find cancer early by checking your skin each month. There are 3 kinds of skin cancer. They are melanoma, basal cell carcinoma, and squamous cell carcinoma. Doing monthly skin checks is the best way to find new marks or skin changes. Follow the instructions below for checking your skin.   The ABCDEs of checking moles for melanoma   Check your moles or growths for signs of melanoma using ABCDE:   Asymmetry: the sides of the mole or growth don t match  Border: the edges are ragged, notched, or blurred  Color: the color within the mole or growth varies  Diameter: the mole or growth is larger than 6 mm (size of a pencil eraser)  Evolving: the size, shape, or color of the mole or growth is changing (evolving is not shown in the images below)    Checking for other types of skin cancer  Basal cell carcinoma or squamous cell carcinoma have symptoms such as:     A spot or mole that looks different from all other marks on your skin  Changes in how an area feels, such as itching, tenderness, or pain  Changes in the skin's surface, such as oozing, bleeding, or scaliness  A sore that does not heal  New swelling or redness beyond the border of a mole    Who s at risk?  Anyone can get skin cancer. But you are at greater risk if you have:   Fair skin, light-colored hair, or light-colored eyes  Many moles or abnormal moles on your skin  A history of sunburns from sunlight or tanning beds  A family history of skin cancer  A history of exposure to radiation or chemicals  A weakened immune system  If you have had skin cancer in the past, you are at risk for recurring skin cancer.   How to check your skin  Do your monthly skin checkups in front of a full-length mirror. Check all parts of your body, including your:   Head (ears, face, neck, and scalp)  Torso (front, back, and sides)  Arms (tops, undersides, upper, and lower armpits)  Hands (palms, backs, and fingers,  including under the nails)  Buttocks and genitals  Legs (front, back, and sides)  Feet (tops, soles, toes, including under the nails, and between toes)  If you have a lot of moles, take digital photos of them each month. Make sure to take photos both up close and from a distance. These can help you see if any moles change over time.   Most skin changes are not cancer. But if you see any changes in your skin, call your doctor right away. Only he or she can diagnose a problem. If you have skin cancer, seeing your doctor can be the first step toward getting the treatment that could save your life.   [employer] last reviewed this educational content on 4/1/2019 2000-2020 [employer]. [address] [place] [address], [place] [address]. All rights reserved. This information is not intended as a substitute for professional medical care. Always follow your healthcare professional's instructions.       When should I call my doctor?  If you are worsening or not improving, please, contact us or seek urgent care as noted below.     Who should I call with questions (adults)?    [facility] [phone]  For urgent needs outside of business hours call the [facility] at [phone] and ask for the dermatology resident on call to be paged  If this is a medical emergency and you are unable to reach an ER, Call [phone]      If you need a prescription refill, please contact your pharmacy. Refills are approved or denied by our Physicians during normal business hours, Monday through Fridays  Per office policy, refills will not be granted if you have not been seen within the past year (or sooner depending on your child's condition)

## 2024-09-17 NOTE — PROGRESS NOTES
[facility] Dermatology Note  Encounter Date: Sep 17, 2024  Office Visit     Dermatology Problem List:  # Eczematous dermatitis - s/p biopsy 2/22/2022 with subacute spongiotic dermatitis with eosinophils, concern for ACD  -Triamcinolone 0.025% ointment twice daily prn, protopic  -Patch testing referral placed 6/18/24  # H/o mild DN, left abdomen, 2013.    ____________________________________________     Assessment & Plan:     # Eczematous dermatitis, chronic, active. Concern for ACD/ICD.  Did not yet complete patch testing.  - Advised daily moisturization with bland emollient.   - continue triamcinolone (KENALOG) 0.025 % external ointment, Apply topically 2 times daily as needed (itchy rashes on the body)   - Start protopic, apply topically BID to face PRN (steroid-sparing agent needed for facial use)  - sensitive skin products were recommended   - Referred to patch testing     # Benign lesions - SKs, cherry angiomas, lentigenes.  - No treatment required  - Cryotherapy deferred for another visit in about 3 months.     # Multiple benign nevi.   # h/o DN.  - Monitor for ABCDEs of melanoma   - Continue sun protection - recommend SPF 30 or higher with frequent application   - Return sooner if noticing changing or symptomatic lesions      Procedures Performed:   None.    Follow-up: 3 month(s) in-person, or earlier for new or changing lesions    Staff and Scribe:     Scribe Disclosure:   ICYNTHIA, am serving as a scribe; to document services personally performed by Suri Ambrose MD-based on data collection and the provider's statements to me.      Provider Disclosure:   The documentation recorded by the scribe accurately reflects the services I personally performed and the decisions made by me.    Suri Ambrose MD    Department of Dermatology  [facility]: Phone: [phone], Fax:  [phone]  6/24/2024     ____________________________________________    CC: Skin Check (Eczematous dermatitis /Remove :/Skin tags on neck /Moles on chest and abdomen/Right thigh )    HPI:  Ms. Mildred Tellez is a(n) 53 year old female who presents today as a return patient for a skin check. Last seen in dermatology by me on 8/30/22, at which time the patient was continued on TMC 0.025% for eczema and was referred for patch testing.     Today, there is a little eczema activity on her face. She also has 2 moles on the left side of her face, on her neck, and on her forehead that she would like removed.  She did not yet complete patch testing.    Patient is otherwise feeling well, without additional skin concerns.    Labs Reviewed:  N/A    Physical Exam:  Vitals: There were no vitals taken for this visit.  SKIN: Full body skin exam excluding the genitals was performed including face, scalp, neck, ears, chest, back, bilateral arms, hands, bilateral legs, feet, and buttocks.   - There are dome shaped bright red papules on the trunk and extremities .   - Multiple regular brown pigmented macules and papules are identified on the trunk and extremities.  - Scattered brown macules on sun exposed areas.  - Waxy stuck on papules and plaques on trunk and extremities.   - ill defined erythema on R medial cheek   - No other lesions of concern on areas examined.     Medications:  Current Outpatient Medications   Medication Sig Dispense Refill    levothyroxine (SYNTHROID/LEVOTHROID) 125 MCG tablet Take 1 tablet (125 mcg) by mouth daily 90 tablet 1    triamcinolone (KENALOG) 0.025 % external ointment Apply topically 2 times daily as needed (itchy rashes on the body) 80 g 1    chlorhexidine (PERIDEX) 0.12 % solution  (Patient not taking: Reported on 9/17/2024)      eflornithine HCl 13.9 % CREA Apply 1/2 inch to facial hair areas nightly (Patient not taking: Reported on 6/18/2024) 45 g 3    tacrolimus (PROTOPIC) 0.1 % external  ointment Apply topically 2 times daily To face as needed (Patient not taking: Reported on 9/17/2024) 30 g 3     No current facility-administered medications for this visit.      Past Medical History:   Patient Active Problem List   Diagnosis    Hypothyroidism, unspecified type    Rosacea    Hyperlipidemia LDL goal <130    RLQ abdominal pain - when getting up from sitting in area of appy    Atypical squamous cells cannot exclude high grade squamous intraepithelial lesion (ASC-H) on Papanicolao smear    Compound nevus of neck - with dysplastic features - right neck 11/2013    ASCUS pap -  7/2013 - saw Dr. Beltran ob/gyn [facility]     History of dysplastic nevus    Cervicalgia    Gas bloat syndrome    Overweight (BMI 25.0-29.9)    Food sensitivity with gastrointestinal symptoms- plants in the nightshade family - tomatoes, potatoes, eggplant, tomatillos , peppers     Multiple pigmented nevi    Painful lumpy left breast- resolved     Tubular adenoma of colon - x 2 on colonoscopy 3/23/2022 - required extra anesthesia = extra nausea & vomiting - may need general anesthesia in 2027 for next colonoscopy     Dyspnea on exertion-  worse since covid-19 3/2022     Viral warts, unspecified type- right mid neck    Breast pain, left- during exam pt mentioned left breast tenderness and there was some mild firmness noted from 12:00 to 3:00     CARDIOVASCULAR SCREENING; LDL GOAL LESS THAN 130    Symptomatic menopausal or female climacteric states- with vasomotor symptoms, mood swings, weight gain & increased facial hair - mom with same - pt thinking about HRT     Past Medical History:   Diagnosis Date    Abnormal Pap smear, can't excl hi gd sq intraepithelial lesion (ASC-H) 7/2013    colp neg    Bleeding gastric ulcer 1997     from taking aspirin at age 27 - couldn't tell whether resolved     Coxsackie virus infection - severe - summer of 2016 - hands and feet totally peeled and oral lesions very painful 9/10/2016    Hypothyroidism      after first pregnancy     Mastodynia- left breast- resolved  10/4/2013    Papanicolaou smear of cervix with atypical squamous cells of undetermined significance (ASC-US) 7/2015     neg HPV, needs colp    Post op infection     s/p her appy as a child     Postpartum septic endometritis     fr.  prolonged rupture    RLQ abdominal pain     when getting up from sitting in area of appy        CC Mary Langston MD  [address] [place] [address] on close of this encounter.

## 2024-09-17 NOTE — LETTER
9/17/2024      Mildred Tellez  [address]  [facility] [address]      Dear Colleague,    Thank you for referring your patient, Mildred Tellez, to the [facility]. Please see a copy of my visit note below.    [facility] Dermatology Note  Encounter Date: Sep 17, 2024  Office Visit     Dermatology Problem List:    #NUB x 3, S/p Biopsy performed on 9/17/24: pending results   1. Eczematous dermatitis - s/p biopsy 2/22/2022 with subacute spongiotic dermatitis with eosinophils, concern for ACD  -Triamcinolone 0.025% ointment twice daily prn, protopic  -Patch testing referral pending  2.  H/o mild DN, left abdomen, 2013.    Last FBSE: 6/18/24    ____________________________________________     Assessment & Plan:     #NUB L inframammary fold, medial Ddx: symptomatic nevi nevi vs other  - Shave biopsy  performed today  - Photographed today     #NUB L inframammary fold, lateral Ddx: symptomatic vs other  - Shave biopsy  performed today  - Photographed today     #NUB L lateral flank, medial Ddx: symptomatic angioma  vs other  - Shave biopsy  performed today  - Photographed today     # Eczematous dermatitis, chronic, active. Concern for ACD/ICD.  Did not yet complete patch testing. Improves with TMC but then recurs. Had a significant reaction to a lip balm.  - continue daily moisturization with bland emollient.   - continue triamcinolone (KENALOG) 0.025 % external ointment, Apply topically 2 times daily as needed  -  encouraged to start using protopic, apply topically BID to face PRN (steroid-sparing agent needed for facial use)  - sensitive skin products are recommended     #iSK  # inflamed skin tags  - ddx for 2 lesions on R posterior neck and R central chest include benign nevi; advised can remove with shave bx if do not resolve with cryo  - cryotherapy performed, see procedure note below      Procedures Performed:   Cryotherapy procedure note: After verbal consent  and discussion of risks and benefits including but no limited to dyspigmentation/scar, blister, and pain, 9 ISKs and inflamed skin tags was(were) treated with 1-2mm freeze border for 2 cycles with liquid nitrogen. Post cryotherapy instructions were provided.     Shave biopsy: Location(s) see above.  After discussion of benefits and risks including but not limited to bleeding/bruising, pain/swelling, infection, scar, incomplete removal, nerve damage/numbness, recurrence, and non-diagnostic biopsy,  verbal consent and photographs were obtained. Time-out was performed. The area was cleaned with isopropyl alcohol. 0.5mL of 1% lidocaine with epinephrine was injected to obtain adequate anesthesia of each lesion. Shave biopsy was performed. Hemostasis was achieved with aluminium chloride. Vaseline and a sterile dressing were applied. The patient tolerated the procedure and no complications were noted. The patient was provided with verbal and written post care instructions.       Follow-up: 3 month(s) in-person, or earlier for new or changing lesions    Staff and Scribe:     Scribe Disclosure:   I, FABIÁN ROLON, am serving as a scribe; to document services personally performed by Suri Ambrose MD -based on data collection and the provider's statements to me.     Provider Disclosure:   The documentation recorded by the scribe accurately reflects the services I personally performed and the decisions made by me.    Suri Ambrose MD    Department of Dermatology  [facility]: Phone: [phone], Fax: [phone]  9/17/2024     ____________________________________________    CC: Skin Check ( Eczematous dermatitis /Skin tags on neck /Moles on chest and abdomen/Right thigh )    HPI:  Ms. Mildred Tellez is a(n) 53 year old female who presents today as a return patient for a skin check. Last seen in dermatology by me on 6/18/24,  at which time the patient was continued on TMC 0.025% a dn started on protopic for Eczema     Today, patient reported that her eczema is stable with steroid, if she would to discontinue it would return. Has not tried use of protopic.   Several irritating areas    Patient is otherwise feeling well, without additional skin concerns.    Labs Reviewed:  N/A    Physical Exam:  Vitals: There were no vitals taken for this visit.  SKIN: focused of below  - ill defined pink patches on lower/lateral chin  -  There is(are) skin colored pedunculated papules on the: L lateral neck x 2, anterior chest x3, R axilla x1  - Fleshy pigmented papules on the R lateral neck R chest x2  -waxy, stuck on tan/brown papules on the central chest, R thigh x2  - L inframammary fold medial there Is a brown fleshy papule  - L inframammary fold lateral there is a brown fleshy papule   - L lateral flank there is a pedunculated papule.   - No other lesions of concern on areas examined.     Medications:  Current Outpatient Medications   Medication Sig Dispense Refill     eflornithine HCl 13.9 % CREA Apply 1/2 inch to facial hair areas nightly (Patient not taking: Reported on 6/18/2024) 45 g 3     levothyroxine (SYNTHROID/LEVOTHROID) 125 MCG tablet Take 1 tablet (125 mcg) by mouth daily 90 tablet 1     tacrolimus (PROTOPIC) 0.1 % external ointment Apply topically 2 times daily To face as needed 30 g 3     triamcinolone (KENALOG) 0.025 % external ointment Apply topically 2 times daily as needed (itchy rashes on the body) 80 g 1     No current facility-administered medications for this visit.      Past Medical History:   Patient Active Problem List   Diagnosis     Hypothyroidism, unspecified type     Rosacea     Hyperlipidemia LDL goal <130     RLQ abdominal pain - when getting up from sitting in area of appy     Atypical squamous cells cannot exclude high grade squamous intraepithelial lesion (ASC-H) on Papanicolao smear     Compound nevus of neck -  with dysplastic features - right neck 11/2013     ASCUS pap -  7/2013 - saw Dr. Beltran ob/gyn [place]      History of dysplastic nevus     Cervicalgia     Gas bloat syndrome     Overweight (BMI 25.0-29.9)     Food sensitivity with gastrointestinal symptoms- plants in the nightshade family - tomatoes, potatoes, eggplant, tomatillos , peppers      Multiple pigmented nevi     Painful lumpy left breast- resolved      Tubular adenoma of colon - x 2 on colonoscopy 3/23/2022 - required extra anesthesia = extra nausea & vomiting - may need general anesthesia in 2027 for next colonoscopy      Dyspnea on exertion-  worse since covid-19 3/2022      Viral warts, unspecified type- right mid neck     Breast pain, left- during exam pt mentioned left breast tenderness and there was some mild firmness noted from 12:00 to 3:00      CARDIOVASCULAR SCREENING; LDL GOAL LESS THAN 130     Symptomatic menopausal or female climacteric states- with vasomotor symptoms, mood swings, weight gain & increased facial hair - mom with same - pt thinking about HRT     Past Medical History:   Diagnosis Date     Abnormal Pap smear, can't excl hi gd sq intraepithelial lesion (ASC-H) 7/2013    colp neg     Bleeding gastric ulcer 1997     from taking aspirin at age 27 - couldn't tell whether resolved      Coxsackie virus infection - severe - summer of 2016 - hands and feet totally peeled and oral lesions very painful 9/10/2016     Hypothyroidism     after first pregnancy      Mastodynia- left breast- resolved  10/4/2013     Papanicolaou smear of cervix with atypical squamous cells of undetermined significance (ASC-US) 7/2015     neg HPV, needs colp     Post op infection     s/p her appy as a child      Postpartum septic endometritis     fr.  prolonged rupture     RLQ abdominal pain     when getting up from sitting in area of appy        CC Mary Langston MD  [address] [place] [address] on close of this encounter.      Again, thank  you for allowing me to participate in the care of your patient.        Sincerely,        Suri Ambrose MD

## 2024-09-18 ENCOUNTER — MYC MEDICAL ADVICE (OUTPATIENT)
Dept: OTOLARYNGOLOGY | Facility: CLINIC | Age: 54
End: 2024-09-18
Payer: COMMERCIAL

## 2024-09-18 NOTE — TELEPHONE ENCOUNTER
This patient needs to reschedule their cancelled appt with Dr. Diaz on Tuesday, 2/4/25 to next available that works for the patient.

## 2024-09-19 LAB
PATH REPORT.COMMENTS IMP SPEC: NORMAL
PATH REPORT.COMMENTS IMP SPEC: NORMAL
PATH REPORT.FINAL DX SPEC: NORMAL
PATH REPORT.GROSS SPEC: NORMAL
PATH REPORT.MICROSCOPIC SPEC OTHER STN: NORMAL
PATH REPORT.RELEVANT HX SPEC: NORMAL

## 2024-10-01 ENCOUNTER — MYC MEDICAL ADVICE (OUTPATIENT)
Dept: FAMILY MEDICINE | Facility: CLINIC | Age: 54
End: 2024-10-01

## 2024-10-01 DIAGNOSIS — E66.3 OVERWEIGHT (BMI 25.0-29.9): Primary | ICD-10-CM

## 2024-10-01 DIAGNOSIS — N95.1 SYMPTOMATIC MENOPAUSAL OR FEMALE CLIMACTERIC STATES: ICD-10-CM

## 2024-10-27 DIAGNOSIS — E03.9 HYPOTHYROIDISM, UNSPECIFIED TYPE: Primary | ICD-10-CM

## 2024-10-27 RX ORDER — LEVOTHYROXINE SODIUM 112 UG/1
112 TABLET ORAL DAILY
Qty: 90 TABLET | Refills: 1 | Status: SHIPPED | OUTPATIENT
Start: 2024-10-27

## 2024-11-21 DIAGNOSIS — E03.9 HYPOTHYROIDISM, UNSPECIFIED TYPE: ICD-10-CM

## 2024-11-21 RX ORDER — LEVOTHYROXINE SODIUM 125 UG/1
125 TABLET ORAL DAILY
Qty: 90 TABLET | Refills: 1 | OUTPATIENT
Start: 2024-11-21

## 2024-11-21 NOTE — TELEPHONE ENCOUNTER
Pharmacy requesting refill on pt's 125mcg dosage of levothyroxine.  Pt's dosage was lowered to 112mcg on 10/27/2024.    Has 90 days on that with a refill that pt picked up on 10/28/2024.

## 2025-01-07 ENCOUNTER — LAB (OUTPATIENT)
Dept: LAB | Facility: CLINIC | Age: 55
End: 2025-01-07
Payer: COMMERCIAL

## 2025-01-07 DIAGNOSIS — E03.9 HYPOTHYROIDISM, UNSPECIFIED TYPE: ICD-10-CM

## 2025-01-07 LAB
T3 SERPL-MCNC: 92 NG/DL (ref 85–202)
T4 FREE SERPL-MCNC: 1.7 NG/DL (ref 0.9–1.7)
TSH SERPL DL<=0.005 MIU/L-ACNC: 0.6 UIU/ML (ref 0.3–4.2)

## 2025-01-07 PROCEDURE — 84480 ASSAY TRIIODOTHYRONINE (T3): CPT

## 2025-01-07 PROCEDURE — 84443 ASSAY THYROID STIM HORMONE: CPT

## 2025-01-07 PROCEDURE — 36415 COLL VENOUS BLD VENIPUNCTURE: CPT

## 2025-01-07 PROCEDURE — 84439 ASSAY OF FREE THYROXINE: CPT

## 2025-01-08 ENCOUNTER — OFFICE VISIT (OUTPATIENT)
Dept: ENDOCRINOLOGY | Facility: CLINIC | Age: 55
End: 2025-01-08
Attending: FAMILY MEDICINE
Payer: COMMERCIAL

## 2025-01-08 VITALS
DIASTOLIC BLOOD PRESSURE: 67 MMHG | BODY MASS INDEX: 27.81 KG/M2 | WEIGHT: 157 LBS | SYSTOLIC BLOOD PRESSURE: 107 MMHG | OXYGEN SATURATION: 98 % | HEART RATE: 73 BPM | RESPIRATION RATE: 16 BRPM

## 2025-01-08 DIAGNOSIS — L65.9 HAIR LOSS: ICD-10-CM

## 2025-01-08 DIAGNOSIS — L68.0 HIRSUTISM: Primary | ICD-10-CM

## 2025-01-08 DIAGNOSIS — N95.1 SYMPTOMATIC MENOPAUSAL OR FEMALE CLIMACTERIC STATES: ICD-10-CM

## 2025-01-08 DIAGNOSIS — E66.3 OVERWEIGHT (BMI 25.0-29.9): ICD-10-CM

## 2025-01-08 NOTE — PATIENT INSTRUCTIONS
Food Calcium in milligrams   Milk (skim, 2%, or whole; 8 oz [240 mL]) 300   Yogurt (6 oz [168 g]) 250   Orange juice (with calcium; 8 oz [240 mL]) 300   Tofu with calcium (0.5 cup [113 g]) 435   Cheese (1 oz [28 g]) 195 to 335 (hard cheese = higher calcium)   Cottage cheese (0.5 cup [113 g]) 130   Ice cream or frozen yogurt (0.5 cup [113 g]) 100   Non-dairy milks (soy, oat, almond; 8 oz [240 mL]) 300 to 450   Beans (0.5 cup cooked [113 g]) 60 to 80   Dark, leafy green vegetables (0.5 cup cooked [113 g]) 50 to 135   Almonds (24 whole) 70   Orange (1 medium) 60     Total daily dose of calcium from food products and/or supplements should be ~ 1000 mg.    The calcium is better absorbed if it is taken in smaller dosages, 2-3 times a day.    Total daily dose of vitamin D should be 1000  units (25 mcg).     Instructions for Collection of a 24 hour Urine Specimen    1. You should collect every drop of urine during each 24-hour period. It does not matter how much or little urine is passed each time, as long as every drop is collected.    2. Begin the urine collection in the morning after you wake up, after you have emptied your bladder for the first time.    3. Urinate (empty the bladder) for the first time and flush it down the toilet. Note the exact time (eg, 6:15 AM). You will begin the urine collection at this time.    4. Collect every drop of urine during the day and night. Store the urine container in the refrigerator in between collections.    5. If you need to have a bowel movement, any urine passed with the bowel movement should be collected. Do not include feces with the urine collection.     6. Finish by collecting the first urine passed the next morning, adding it to the collection bottle. This should be within ten minutes before or after the time of the first morning void on the first day (which was flushed). In this example, you would try to void between 6:05 and 6:25 AM on the second day.    7. The urine  container should be kept cool or refrigerated until it is returned to the lab.  A lab with your name, medical record number and date of birth must be attached to the urine container.  A lab request form completed by your clinic/doctor with your name medical number number, date of birth and test requested must accompany the urine specimen.  Record on the lab request form the date and time (am/pm) of the start of the urine collection and date and time of the end of the urine collection.    5. If you have any questions, feel free to call the laboratory at [phone] or the clinic at .        Welcome to the [facility] Endocrinology and Diabetes Clinics     Our Endocrinology Clinics are here to provide you with a team-based, collaborative approach in the diagnosis and treatment of patients with diabetes and endocrine disorders. The team is made up of Physicians, Physician Assistants, Certified Diabetes Educators, Registered Nurses, Medical Assistants, Emergency Medical Technicians, and many others, all of whom have the unified goal of providing our patients with high quality care.     Please see below for some helpful tips to best navigate and use the [facility] Endocrinology clinic:     [place] Respect: At [facility], we are committed to a respectful and safe space for all patients, visitors, and staff.  We believe that mutual respect between patients and their care team is the foundation of quality care.  It is our expectation that you will be treated with respect by your care team.  In turn, we ask that all communication with the care team (written and verbal) be respectful and free from profanity, threatening, or abusive language.  Disrespectful communication undermines our therapeutic relationship with you and may result in us being unable to continue to provide your care.    Refills: A provider must see you at least annually to prescribe and refill medications. This is to ensure  your safety as well as meet insurance and compliance regulations.    Scheduling: Many of our Providers offer both in-person or video visits. Please call to schedule any needed follow ups as soon as possible because our provider schedules fill up very quickly. Our care team has the right to require an in-person visit when they believe that it is medically necessary. Please remember that for any virtual visits, you must be in the state of [place] at the time of the visit, otherwise we are unable to see you and you will need to be rescheduled.    Missed Appointments: If you need to cancel or miss your scheduled appointment, please call the clinic at [phone] to reschedule.  Please note if you repeatedly miss appointments or repeatedly miss appointments without calling to inform us ahead of time (no-show), the clinic may elect to not allow you to reschedule without speaking to a manager, may require a Partnership In Care Agreement prior to rescheduling, or could result in you no longer being able to receive care from the clinic. Providing the clinic with timely notification if you have to miss an appointment, allows us to better serve the needs of all of our patients.    Primary Care Provider: Our Endocrinologists are Specialists in their field. We expect you to have a Primary Care Provider established to handle any needs outside of your diabetes and endocrine care.  We would be happy to assist you find a Primary Care Provider, if you do not have one.    ReadyPulse: ReadyPulse is a wonderful resource that allows you access to your Care Team via online or the alan. Please ask a member of the team if you would like help creating an account. Please note that it may take up to 2 business days for a response. ReadyPulse messages are not reviewed on weekends or after business hours.  Emergent or urgent care needs should never be communicated via ReadyPulse.  If you experience a medical emergency call 911 or go to the nearest  emergency room.    Labs: It is recommended that you stay within the [facility] for labs but you are welcome to obtain ordered labs (with some exceptions) from any location of your choice as long as they are able to complete and process the needed labs. If you need us to fax orders to your preferred lab, please provide us the name and fax number of the lab you would like to go to so we can fax the orders. If your labs are drawn outside of the [facility], please have them fax the results to [phone] ([place]) or [phone] ([place]) or via [facility]. It is your responsibility to ensure that outside lab results are sent to us.    We look forward to working with you. Please do not hesitate to reach out with any questions.    Thank you,    The Endocrine Team    [facility] Address:   [place] Address:     [address] [place] [address]    Phone: [phone]  Fax: [phone]   [address] [place] [address]    Phone: [phone]  Fax: [phone]     [facility] Cost Estimate Phone Number: [phone]    General Lab and Imaging Scheduling Phone Number: [phone]

## 2025-01-08 NOTE — PROGRESS NOTES
Endocrinology Clinic Visit 1/8/2025    NAME:  Mildred Tellez  PCP:  Mary Langston  MRN:  [id number]  Reason for Consult:  Hx of Hypothyroidism, Concerns regarding weight loss.   Requesting Provider:  Mary Langston    Chief Complaint     Hypothyroidism  Inability to lose weight      History of Present Illness     Mildred Tellez is a 54 year old female who is seen in clinic for history of hypothyroidism and concerns regarding weight loss.      Weight  Mildred is a patient with a BMI of 27.81 (overweight) without current health consequences. Patient is concerned about her weight and is motivated to lose weight. Has creeped up about 10-15 pounds over the past five years that she is unable to get off. Her problem is complicated by an endocrine disorder, with a history of hypothyroidism.     Used to never have issues with losing weight as long as she followed a diet. Recent 3 month diet before the holidays included no sugar or starches. 1/2 a pound was lost after the three months. Doing weight watchers points for 23 years. Sometimes goes over but usually eats the same amount and had been able to maintain weight. She has a healthy diet with fiber, vegetables, beans/lentils. She has strong motivation and good literacy about healthy foods. Has good access to food. Ability to loose weight not impacted by her job.     She partakes in more than 150min of working out a week. Doing about 40-50 minutes of walking every day for the past three weeks. Uses resistance bands and 5 pounds weights in her routine as well. Has had joint pain and back pain with exercise in the past. Has to do it in small chunks and alternate her workout routine. When usng weights her elbow joints will be painful.     Her Mom passed away in the beginning of December so at that time was not as steady but did still walk 150 minutes a week.     We reviewed the following trend in weight together.     Wt Readings from Last 10 Encounters:    01/08/25 71.2 kg (157 lb)   07/16/24 71.2 kg (157 lb)   05/30/24 71.7 kg (158 lb)   12/21/22 71.2 kg (157 lb)   02/19/22 65.8 kg (145 lb)   01/06/22 66.7 kg (147 lb)   12/20/21 66.7 kg (147 lb)   12/16/20 65.3 kg (144 lb)   01/23/20 68.5 kg (151 lb)   09/20/19 68.9 kg (152 lb)       Has not been eating as much with her lower thyroid dose but is still not losing weight. Feels like she would have to starve to loose weight. Notes that when she was on a higher dose of levothyroxine she would eat more frequently, snacking more often then her family members and having daydreams about eating sweets. This has improved since going to a lower dose.     Hirsutism started to happen since menopause. Interested in tests for hormones or other things to do about this as well as for weight loss.  Menarche occurred at age 11 and menstrual periods have been regular, with no prior history of infertility.  She used to have mild hair in the chin and mid abdominal area but, since menopause, the facial hair has worsened.  She has also noticed scalp hair loss.    Denies new stretch marks, acne, easy bruising. Endorses some muscle weakness when working out or using the stairs.     Absence of periods for one full year was in May of 2024. Has been having hot flashes but this has resolved with having her Thyroid medications decreased.     Hypothyroidism  Diagnosed with hypothyroidism in 2006.  TPO antibodies were positive.  Current treatment is levothyroxine 112 mcg. Recently this dose was decreased around the same time she was undergoing menopause. Had been at 137 mcg in 12/23, transitioned to 125 mcg 6/24 and then began the 112 mcg dose 10/24.     Last TFTs 1/7/25 with TSH 0.60, T4 1.70 and T3 100. TPO tested in 2008 was 2135.     Symptoms of thyroid disease:  The patient reports: weight gain, dry skin, and feeling excessive energy. Did feel heart palpitations within the past 1-2 years but has never gotten it checked. Does seem to coincide  around the time she was on a higher dose of levo. Has not noticed any palpitations recently in the past few months.     The patient denies anycold intolerance, constipation, swelling, feeling down, feeling depressed, anxiousness, tremulousness.  No recent history of febrile illness with neck pain or sore throat.     Localized symptoms: there is no throat swelling, trouble swallowing, no SOB when lying flat, and no voice changes.     Family history of thyroid disease: No  Family history of thyroid cancer: No  Personal history of high-dose radiation especially in childhood: Is from [facility] and does note that she was exposed to radiation when there as a child.      Problem List     Patient Active Problem List   Diagnosis    Hypothyroidism, unspecified type    Rosacea    Hyperlipidemia LDL goal <130    RLQ abdominal pain - when getting up from sitting in area of appy    Atypical squamous cells of undetermined significance on cytologic smear of cervix (ASC-US)    Compound nevus of neck - with dysplastic features - right neck 11/2013    ASCUS pap -  7/2013 - saw Dr. Beltran ob/g[facility] [place]     History of dysplastic nevus    Cervicalgia    Gas bloat syndrome    Overweight (BMI 25.0-29.9)    Food sensitivity with gastrointestinal symptoms- plants in the nightshade family - tomatoes, potatoes, eggplant, tomatillos , peppers     Multiple pigmented nevi    Painful lumpy left breast- resolved     Tubular adenoma of colon - x 2 on colonoscopy 3/23/2022 - required extra anesthesia = extra nausea & vomiting - may need general anesthesia in 2027 for next colonoscopy     Dyspnea on exertion-  worse since covid-19 3/2022     Viral warts, unspecified type- right mid neck    Breast pain, left- during exam pt mentioned left breast tenderness and there was some mild firmness noted from 12:00 to 3:00     CARDIOVASCULAR SCREENING; LDL GOAL LESS THAN 130    Symptomatic menopausal or female climacteric states- with vasomotor symptoms, mood  swings, weight gain & increased facial hair - mom with same - pt thinking about HRT   No prior history of fractures  DEXA scan from July 2024 revealed normal bone mineral density    Medications     Current Outpatient Medications   Medication Sig Dispense Refill    levothyroxine (SYNTHROID/LEVOTHROID) 112 MCG tablet Take 1 tablet (112 mcg) by mouth daily. 90 tablet 1    tacrolimus (PROTOPIC) 0.1 % external ointment Apply topically 2 times daily To face as needed (Patient not taking: Reported on 9/17/2024) 30 g 3    triamcinolone (KENALOG) 0.025 % external ointment Apply topically 2 times daily as needed (itchy rashes on the body) 80 g 1     No current facility-administered medications for this visit.        Allergies     No Known Allergies    Medical / Surgical History     Past Medical History:   Diagnosis Date    Abnormal Pap smear, can't excl hi gd sq intraepithelial lesion (ASC-H) 7/2013    colp neg    Bleeding gastric ulcer 1997     from taking aspirin at age 27 - couldn't tell whether resolved     Coxsackie virus infection - severe - summer of 2016 - hands and feet totally peeled and oral lesions very painful 9/10/2016    Hypothyroidism     after first pregnancy     Mastodynia- left breast- resolved  10/4/2013    Papanicolaou smear of cervix with atypical squamous cells of undetermined significance (ASC-US) 7/2015     neg HPV, needs colp    Post op infection     s/p her appy as a child     Postpartum septic endometritis     fr.  prolonged rupture    RLQ abdominal pain     when getting up from sitting in area of appy     Past Surgical History:   Procedure Laterality Date    COLONOSCOPY N/A 3/23/2022    Procedure: COLONOSCOPY, FLEXIBLE, WITH polypectomies USING hot SNARE and cold jumbo forceps;  Surgeon: Eric Aguilar MD;  Location:  GI    COLONOSCOPY N/A 3/23/2022    Procedure: COLONOSCOPY, WITH POLYPECTOMY AND BIOPSY;  Surgeon: Eric Aguilar MD;  Location:  GI    [facility] APPENDECTOMY  1984       Social  History     Social History     Socioeconomic History    Marital status:      Spouse name: Raman (aditya Smith)    Number of children: 2    Years of education: 16+    Highest education level: Not on file   Occupational History    Occupation:  - Resource Data      Employer: [employer]     Comment:  in [facility]   Tobacco Use    Smoking status: Never    Smokeless tobacco: Never   Vaping Use    Vaping status: Never Used   Substance and Sexual Activity    Alcohol use: Never     Alcohol/week: 0.0 standard drinks of alcohol    Drug use: No     Comment: no herbal meds either     Sexual activity: Yes     Partners: Male     Birth control/protection: Condom     Comment:  - rhythm method    Other Topics Concern     Service No    Blood Transfusions No    Caffeine Concern No     Comment: has been caffeine-free since bleeding gastric ulcer in 1997    Occupational Exposure Not Asked    Hobby Hazards Not Asked    Sleep Concern Not Asked    Stress Concern Not Asked    Weight Concern Not Asked    Special Diet Not Asked    Back Care Not Asked    Exercise Yes     Comment: swimming usually     Bike Helmet Not Asked    Seat Belt Yes     Comment: always     Self-Exams Yes     Comment: SBE encouraged monthly    Parent/sibling w/ CABG, MI or angioplasty before 65F 55M? No   Social History Narrative    From [facility] - immig[place] to [place] in [place]         Children: Moiz - born in 2002 and Sherie = younger      Social Drivers of Health     Financial Resource Strain: Low Risk  (5/30/2024)    Financial Resource Strain     Within the past 12 months, have you or your family members you live with been unable to get utilities (heat, electricity) when it was really needed?: No   Food Insecurity: Low Risk  (5/30/2024)    Food Insecurity     Within the past 12 months, did you worry that your food would run out before you got money to buy more?: No     Within the past 12 months, did the food  you bought just not last and you didn t have money to get more?: No   Transportation Needs: Low Risk  (5/30/2024)    Transportation Needs     Within the past 12 months, has lack of transportation kept you from medical appointments, getting your medicines, non-medical meetings or appointments, work, or from getting things that you need?: No   Physical Activity: Sufficiently Active (5/30/2024)    Exercise Vital Sign     Days of Exercise per Week: 3 days     Minutes of Exercise per Session: 150+ min   Stress: No Stress Concern Present (5/30/2024)    [employer] - Occupational Stress Questionnaire     Feeling of Stress : Not at all   Social Connections: Unknown (5/30/2024)    Social Connection and Isolation Panel [NHANES]     Frequency of Communication with Friends and Family: Not on file     Frequency of Social Gatherings with Friends and Family: Once a week     Attends Denominational Services: Not on file     Active Member of Clubs or Organizations: Not on file     Attends Club or Organization Meetings: Not on file     Marital Status: Not on file   Interpersonal Safety: Low Risk  (5/30/2024)    Interpersonal Safety     Do you feel physically and emotionally safe where you currently live?: Yes     Within the past 12 months, have you been hit, slapped, kicked or otherwise physically hurt by someone?: No     Within the past 12 months, have you been humiliated or emotionally abused in other ways by your partner or ex-partner?: No   Housing Stability: Low Risk  (5/30/2024)    Housing Stability     Do you have housing? : Yes     Are you worried about losing your housing?: No       Family History     Family History   Problem Relation Age of Onset    Hypertension Mother     C.A.D. Father         on blood thinners for his PAD    Cardiovascular Father         peripheral vascular disease- very heavy smoker     Lung Cancer Father     Hypertension Maternal Grandmother     Diabetes Paternal Grandmother           of complications fr. diabetes    Breast Cancer Other         paternal great aunt    Colon Cancer No family hx of     Glaucoma No family hx of     Macular Degeneration No family hx of    There is a family history of obesity on both sides of the family.  I Have To Do This Okay    ROS   Review of systems negative except for as indicated in the HPI.     Physical Exam   There were no vitals taken for this visit.     General: Comfortable, no obvious distress.  Eyes: Sclera anicteric, moist conjunctiva, no lid lag, no exophthalmos  HENT: Atraumatic, oropharynx clear, moist mucous membranes with no mucosal ulcerations  Neck: Trachea midline, supple. Thyroid: Thyroid is normal in size and texture  CV: Regular rhythm, normal rate. No murmurs auscultated  Resp: Clear to auscultation bilaterally, good effort  Abdomen:  Soft, non tender, non distended. Bowel sounds heard. No organomegaly. No striae  Skin: Mid abdominal line hirsutism, diffuse facial hirsutism along the jawline and upper lip  Psych: Alert and oriented x 3. Appropriate affect, good insight  Extremities: No peripheral edema  Musculoskeletal: Appropriate muscle bulk and strength  Lymphatic: No cervical lymphadenopathy  Neuro: Moves all four extremities. No focal deficits on limited exam. Gait normal. No resting tremor, deep tendon reflexes with normal relaxation phase.     Labs/Imaging     Pertinent Labs were reviewed and discussed briefly.    Summary of recent findings:     TSH   Date Value Ref Range Status   2025 0.60 0.30 - 4.20 uIU/mL Final   2024 0.09 (L) 0.30 - 4.20 uIU/mL Final   2024 0.07 (L) 0.30 - 4.20 uIU/mL Final   2022 0.30 0.30 - 4.20 uIU/mL Final   2022 0.80 0.40 - 4.00 mU/L Final   2021 0.46 0.40 - 4.00 mU/L Final   2021 0.74 0.40 - 4.00 mU/L Final   2021 7.01 (H) 0.40 - 4.00 mU/L Final   2020 3.27 0.40 - 4.00 mU/L Final   2019 2.81 0.40 - 4.00 mU/L Final   2019 0.31 (L) 0.40 -  4.00 mU/L Final     T4 Free   Date Value Ref Range Status   06/23/2021 1.36 0.76 - 1.46 ng/dL Final   04/21/2021 1.24 0.76 - 1.46 ng/dL Final   12/16/2020 1.53 (H) 0.76 - 1.46 ng/dL Final   12/26/2019 1.25 0.76 - 1.46 ng/dL Final   09/27/2019 1.39 0.76 - 1.46 ng/dL Final     Free T4   Date Value Ref Range Status   01/07/2025 1.70 0.90 - 1.70 ng/dL Final   09/13/2024 2.01 (H) 0.90 - 1.70 ng/dL Final   05/31/2024 1.72 (H) 0.90 - 1.70 ng/dL Final   12/21/2022 1.37 0.90 - 1.70 ng/dL Final   03/29/2022 1.46 0.76 - 1.46 ng/dL Final         Impression / Plan     1. BMI 27.81, Overweight  2. Desire for weight loss  3. Hirsutism, hair loss  Patient presenting today for discussion of inability to lose weight, weight gain and desire for weight loss. We did discuss the impact that cortisol could have on weight gain and given facial hair and some muscle weakness feel reasonable to check this for reassurance. Also will check androgen hormones as increased hirsutism may be caused partially by the decrease in estrogen post-menopause. Discussion was also had about normal changes/trends in weight overtime as we age. Patient will continue working on diet and exercise Will work on tracking calories. Discussion will be had according to lab results and will consider referral to the weight management clinic pending these results. Patient is open to trialing spironolactone if indicated.     4. Hypothyroidism due to Hashimoto thyroiditis  Patient with a long history of treated hypothyroidism. Patients TFTs 1/8/25 all within normal limits. Levothyroxine dose not need to be adjusted at this time. Discussion was had with patient about need to decrease levo dose over the past year since patient began menopause and estrogen levels have decreased.     Plan:  Continue levothyroxine at 112 mcg daily  Referral to weight management clinic pending lab results and insurance coverage  Labs as Below:    Test and/or medications prescribed today:  Orders  Placed This Encounter   Procedures    DHEA sulfate    Androstenedione    Adrenal corticotropin    DHEA sulfate    17 OH progesterone    Cortisol free urine    Creatinine timed urine    Cortisol    Testosterone Free and Total     Linda Gunderson  Medical Student Year 4    65 minutes spent on the date of the encounter doing chart review, history and exam, documentation and further activities as noted above.

## 2025-01-08 NOTE — LETTER
1/8/2025      Mildred Tellez  [address]  [facility] [address]      Dear Colleague,    Thank you for referring your patient, Mildred Tellez, to the [facility]. Please see a copy of my visit note below.    Endocrinology Clinic Visit 1/8/2025    NAME:  Mildred eTllez  PCP:  Mary Langston  MRN:  [id number]  Reason for Consult:  Hx of Hypothyroidism, Concerns regarding weight loss.   Requesting Provider:  Mary Langston    Chief Complaint     Hypothyroidism  Inability to lose weight      History of Present Illness     Mildred Tellez is a 54 year old female who is seen in clinic for history of hypothyroidism and concerns regarding weight loss.      Weight  Mildred is a patient with a BMI of 27.81 (overweight) without current health consequences. Patient is concerned about her weight and is motivated to lose weight. Has creeped up about 10-15 pounds over the past five years that she is unable to get off. Her problem is complicated by an endocrine disorder, with a history of hypothyroidism.     Used to never have issues with losing weight as long as she followed a diet. Recent 3 month diet before the holidays included no sugar or starches. 1/2 a pound was lost after the three months. Doing weight watchers points for 23 years. Sometimes goes over but usually eats the same amount and had been able to maintain weight. She has a healthy diet with fiber, vegetables, beans/lentils. She has strong motivation and good literacy about healthy foods. Has good access to food. Ability to loose weight not impacted by her job.     She partakes in more than 150min of working out a week. Doing about 40-50 minutes of walking every day for the past three weeks. Uses resistance bands and 5 pounds weights in her routine as well. Has had joint pain and back pain with exercise in the past. Has to do it in small chunks and alternate her workout routine. When usng weights her elbow joints  will be painful.     Her Mom passed away in the beginning of December so at that time was not as steady but did still walk 150 minutes a week.     We reviewed the following trend in weight together.     Wt Readings from Last 10 Encounters:   01/08/25 71.2 kg (157 lb)   07/16/24 71.2 kg (157 lb)   05/30/24 71.7 kg (158 lb)   12/21/22 71.2 kg (157 lb)   02/19/22 65.8 kg (145 lb)   01/06/22 66.7 kg (147 lb)   12/20/21 66.7 kg (147 lb)   12/16/20 65.3 kg (144 lb)   01/23/20 68.5 kg (151 lb)   09/20/19 68.9 kg (152 lb)       Has not been eating as much with her lower thyroid dose but is still not losing weight. Feels like she would have to starve to loose weight. Notes that when she was on a higher dose of levothyroxine she would eat more frequently, snacking more often then her family members and having daydreams about eating sweets. This has improved since going to a lower dose.     Hirsutism started to happen since menopause. Interested in tests for hormones or other things to do about this as well as for weight loss.  Menarche occurred at age 11 and menstrual periods have been regular, with no prior history of infertility.  She used to have mild hair in the chin and mid abdominal area but, since menopause, the facial hair has worsened.  She has also noticed scalp hair loss.    Denies new stretch marks, acne, easy bruising. Endorses some muscle weakness when working out or using the stairs.     Absence of periods for one full year was in May of 2024. Has been having hot flashes but this has resolved with having her Thyroid medications decreased.     Hypothyroidism  Diagnosed with hypothyroidism in 2006.  TPO antibodies were positive.  Current treatment is levothyroxine 112 mcg. Recently this dose was decreased around the same time she was undergoing menopause. Had been at 137 mcg in 12/23, transitioned to 125 mcg 6/24 and then began the 112 mcg dose 10/24.     Last TFTs 1/7/25 with TSH 0.60, T4 1.70 and T3 100. TPO  tested in 2008 was 2135.     Symptoms of thyroid disease:  The patient reports: weight gain, dry skin, and feeling excessive energy. Did feel heart palpitations within the past 1-2 years but has never gotten it checked. Does seem to coincide around the time she was on a higher dose of levo. Has not noticed any palpitations recently in the past few months.     The patient denies anycold intolerance, constipation, swelling, feeling down, feeling depressed, anxiousness, tremulousness.  No recent history of febrile illness with neck pain or sore throat.     Localized symptoms: there is no throat swelling, trouble swallowing, no SOB when lying flat, and no voice changes.     Family history of thyroid disease: No  Family history of thyroid cancer: No  Personal history of high-dose radiation especially in childhood: Is from [facility] and does note that she was exposed to radiation when there as a child.      Problem List     Patient Active Problem List   Diagnosis     Hypothyroidism, unspecified type     Rosacea     Hyperlipidemia LDL goal <130     RLQ abdominal pain - when getting up from sitting in area of appy     Atypical squamous cells of undetermined significance on cytologic smear of cervix (ASC-US)     Compound nevus of neck - with dysplastic features - right neck 11/2013     ASCUS pap -  7/2013 - saw Dr. Beltran ob/g[facility]      History of dysplastic nevus     Cervicalgia     Gas bloat syndrome     Overweight (BMI 25.0-29.9)     Food sensitivity with gastrointestinal symptoms- plants in the nightshade family - tomatoes, potatoes, eggplant, tomatillos , peppers      Multiple pigmented nevi     Painful lumpy left breast- resolved      Tubular adenoma of colon - x 2 on colonoscopy 3/23/2022 - required extra anesthesia = extra nausea & vomiting - may need general anesthesia in 2027 for next colonoscopy      Dyspnea on exertion-  worse since covid-19 3/2022      Viral warts, unspecified type- right mid neck     Breast  pain, left- during exam pt mentioned left breast tenderness and there was some mild firmness noted from 12:00 to 3:00      CARDIOVASCULAR SCREENING; LDL GOAL LESS THAN 130     Symptomatic menopausal or female climacteric states- with vasomotor symptoms, mood swings, weight gain & increased facial hair - mom with same - pt thinking about HRT   No prior history of fractures  DEXA scan from July 2024 revealed normal bone mineral density    Medications     Current Outpatient Medications   Medication Sig Dispense Refill     levothyroxine (SYNTHROID/LEVOTHROID) 112 MCG tablet Take 1 tablet (112 mcg) by mouth daily. 90 tablet 1     tacrolimus (PROTOPIC) 0.1 % external ointment Apply topically 2 times daily To face as needed (Patient not taking: Reported on 9/17/2024) 30 g 3     triamcinolone (KENALOG) 0.025 % external ointment Apply topically 2 times daily as needed (itchy rashes on the body) 80 g 1     No current facility-administered medications for this visit.        Allergies     No Known Allergies    Medical / Surgical History     Past Medical History:   Diagnosis Date     Abnormal Pap smear, can't excl hi gd sq intraepithelial lesion (ASC-H) 7/2013    colp neg     Bleeding gastric ulcer 1997     from taking aspirin at age 27 - couldn't tell whether resolved      Coxsackie virus infection - severe - summer of 2016 - hands and feet totally peeled and oral lesions very painful 9/10/2016     Hypothyroidism     after first pregnancy      Mastodynia- left breast- resolved  10/4/2013     Papanicolaou smear of cervix with atypical squamous cells of undetermined significance (ASC-US) 7/2015     neg HPV, needs colp     Post op infection     s/p her appy as a child      Postpartum septic endometritis     fr.  prolonged rupture     RLQ abdominal pain     when getting up from sitting in area of appy     Past Surgical History:   Procedure Laterality Date     COLONOSCOPY N/A 3/23/2022    Procedure: COLONOSCOPY, FLEXIBLE, WITH  polypectomies USING hot SNARE and cold jumbo forceps;  Surgeon: Eric Aguilar MD;  Location:  GI     COLONOSCOPY N/A 3/23/2022    Procedure: COLONOSCOPY, WITH POLYPECTOMY AND BIOPSY;  Surgeon: Eric Aguilar MD;  Location:  GI     ZZC APPENDECTOMY  1984       Social History     Social History     Socioeconomic History     Marital status:      Spouse name: Raman (aditya Smith)     Number of children: 2     Years of education: 16+     Highest education level: Not on file   Occupational History     Occupation:  [employer]      Employer: [employer]     Comment:  in [facility]   Tobacco Use     Smoking status: Never     Smokeless tobacco: Never   Vaping Use     Vaping status: Never Used   Substance and Sexual Activity     Alcohol use: Never     Alcohol/week: 0.0 standard drinks of alcohol     Drug use: No     Comment: no herbal meds either      Sexual activity: Yes     Partners: Male     Birth control/protection: Condom     Comment:  - rhythm method    Other Topics Concern      Service No     Blood Transfusions No     Caffeine Concern No     Comment: has been caffeine-free since bleeding gastric ulcer in 1997     Occupational Exposure Not Asked     Hobby Hazards Not Asked     Sleep Concern Not Asked     Stress Concern Not Asked     Weight Concern Not Asked     Special Diet Not Asked     Back Care Not Asked     Exercise Yes     Comment: swimming usually      Bike Helmet Not Asked     Seat Belt Yes     Comment: always      Self-Exams Yes     Comment: SBE encouraged monthly     Parent/sibling w/ CABG, MI or angioplasty before 65F 55M? No   Social History Narrative    From [facility] - immig[place] to [place] in [place]         Children: Moiz - born in 2002 and Sherie = younger      Social Drivers of Health     Financial Resource Strain: Low Risk  (5/30/2024)    Financial Resource Strain      Within the past 12 months, have you or your family members you  live with been unable to get utilities (heat, electricity) when it was really needed?: No   Food Insecurity: Low Risk  (5/30/2024)    Food Insecurity      Within the past 12 months, did you worry that your food would run out before you got money to buy more?: No      Within the past 12 months, did the food you bought just not last and you didn t have money to get more?: No   Transportation Needs: Low Risk  (5/30/2024)    Transportation Needs      Within the past 12 months, has lack of transportation kept you from medical appointments, getting your medicines, non-medical meetings or appointments, work, or from getting things that you need?: No   Physical Activity: Sufficiently Active (5/30/2024)    Exercise Vital Sign      Days of Exercise per Week: 3 days      Minutes of Exercise per Session: 150+ min   Stress: No Stress Concern Present (5/30/2024)    [employer] - Occupational Stress Questionnaire      Feeling of Stress : Not at all   Social Connections: Unknown (5/30/2024)    Social Connection and Isolation Panel [NHANES]      Frequency of Communication with Friends and Family: Not on file      Frequency of Social Gatherings with Friends and Family: Once a week      Attends Taoist Services: Not on file      Active Member of Clubs or Organizations: Not on file      Attends Club or Organization Meetings: Not on file      Marital Status: Not on file   Interpersonal Safety: Low Risk  (5/30/2024)    Interpersonal Safety      Do you feel physically and emotionally safe where you currently live?: Yes      Within the past 12 months, have you been hit, slapped, kicked or otherwise physically hurt by someone?: No      Within the past 12 months, have you been humiliated or emotionally abused in other ways by your partner or ex-partner?: No   Housing Stability: Low Risk  (5/30/2024)    Housing Stability      Do you have housing? : Yes      Are you worried about losing your housing?: No        Family History     Family History   Problem Relation Age of Onset     Hypertension Mother      C.A.D. Father         on blood thinners for his PAD     Cardiovascular Father         peripheral vascular disease- very heavy smoker      Lung Cancer Father      Hypertension Maternal Grandmother      Diabetes Paternal Grandmother          of complications fr. diabetes     Breast Cancer Other         paternal great aunt     Colon Cancer No family hx of      Glaucoma No family hx of      Macular Degeneration No family hx of    There is a family history of obesity on both sides of the family.  I Have To Do This Okay    ROS   Review of systems negative except for as indicated in the HPI.     Physical Exam   There were no vitals taken for this visit.     General: Comfortable, no obvious distress.  Eyes: Sclera anicteric, moist conjunctiva, no lid lag, no exophthalmos  HENT: Atraumatic, oropharynx clear, moist mucous membranes with no mucosal ulcerations  Neck: Trachea midline, supple. Thyroid: Thyroid is normal in size and texture  CV: Regular rhythm, normal rate. No murmurs auscultated  Resp: Clear to auscultation bilaterally, good effort  Abdomen:  Soft, non tender, non distended. Bowel sounds heard. No organomegaly. No striae  Skin: Mid abdominal line hirsutism, diffuse facial hirsutism along the jawline and upper lip  Psych: Alert and oriented x 3. Appropriate affect, good insight  Extremities: No peripheral edema  Musculoskeletal: Appropriate muscle bulk and strength  Lymphatic: No cervical lymphadenopathy  Neuro: Moves all four extremities. No focal deficits on limited exam. Gait normal. No resting tremor, deep tendon reflexes with normal relaxation phase.     Labs/Imaging     Pertinent Labs were reviewed and discussed briefly.    Summary of recent findings:     TSH   Date Value Ref Range Status   2025 0.60 0.30 - 4.20 uIU/mL Final   2024 0.09 (L) 0.30 - 4.20 uIU/mL Final   2024 0.07 (L) 0.30  - 4.20 uIU/mL Final   12/21/2022 0.30 0.30 - 4.20 uIU/mL Final   03/29/2022 0.80 0.40 - 4.00 mU/L Final   12/20/2021 0.46 0.40 - 4.00 mU/L Final   06/23/2021 0.74 0.40 - 4.00 mU/L Final   04/21/2021 7.01 (H) 0.40 - 4.00 mU/L Final   12/16/2020 3.27 0.40 - 4.00 mU/L Final   12/26/2019 2.81 0.40 - 4.00 mU/L Final   09/27/2019 0.31 (L) 0.40 - 4.00 mU/L Final     T4 Free   Date Value Ref Range Status   06/23/2021 1.36 0.76 - 1.46 ng/dL Final   04/21/2021 1.24 0.76 - 1.46 ng/dL Final   12/16/2020 1.53 (H) 0.76 - 1.46 ng/dL Final   12/26/2019 1.25 0.76 - 1.46 ng/dL Final   09/27/2019 1.39 0.76 - 1.46 ng/dL Final     Free T4   Date Value Ref Range Status   01/07/2025 1.70 0.90 - 1.70 ng/dL Final   09/13/2024 2.01 (H) 0.90 - 1.70 ng/dL Final   05/31/2024 1.72 (H) 0.90 - 1.70 ng/dL Final   12/21/2022 1.37 0.90 - 1.70 ng/dL Final   03/29/2022 1.46 0.76 - 1.46 ng/dL Final         Impression / Plan     1. BMI 27.81, Overweight  2. Desire for weight loss  3. Hirsutism, hair loss  Patient presenting today for discussion of inability to lose weight, weight gain and desire for weight loss. We did discuss the impact that cortisol could have on weight gain and given facial hair and some muscle weakness feel reasonable to check this for reassurance. Also will check androgen hormones as increased hirsutism may be caused partially by the decrease in estrogen post-menopause. Discussion was also had about normal changes/trends in weight overtime as we age. Patient will continue working on diet and exercise Will work on tracking calories. Discussion will be had according to lab results and will consider referral to the weight management clinic pending these results. Patient is open to trialing spironolactone if indicated.     4. Hypothyroidism due to Hashimoto thyroiditis  Patient with a long history of treated hypothyroidism. Patients TFTs 1/8/25 all within normal limits. Levothyroxine dose not need to be adjusted at this time. Discussion  was had with patient about need to decrease levo dose over the past year since patient began menopause and estrogen levels have decreased.     Plan:  Continue levothyroxine at 112 mcg daily  Referral to weight management clinic pending lab results and insurance coverage  Labs as Below:    Test and/or medications prescribed today:  Orders Placed This Encounter   Procedures     DHEA sulfate     Androstenedione     Adrenal corticotropin     DHEA sulfate     17 OH progesterone     Cortisol free urine     Creatinine timed urine     Cortisol     Testosterone Free and Total     Linda Gunderson  Medical Student Year 4    65 minutes spent on the date of the encounter doing chart review, history and exam, documentation and further activities as noted above.        Again, thank you for allowing me to participate in the care of your patient.        Sincerely,      Britany Licona MD    Electronically signed

## 2025-01-08 NOTE — NURSING NOTE
Mildred Tellez's goals for this visit include:   Chief Complaint   Patient presents with    New Patient    Thyroid Problem       She requests these members of her care team be copied on today's visit information: yes    PCP: Mary Langston    Referring Provider:  Mary Langston MD  [address] [place] [id number]    /67   Pulse 73   Resp 16   Wt 71.2 kg (157 lb)   SpO2 98%   BMI 27.81 kg/m      Do you need any medication refills at today's visit? No    Moiz Lopez, EMT

## 2025-01-09 NOTE — RESULT ENCOUNTER NOTE
Dear Mildred,     Thank you for your patience in getting back to you re: your results.      All your recent labs that I ordered are normal, improved, or pretty stable from previous.     Please, continue your current medications and/or supplements and follow up as we discussed at your last visit.     For additional lab test information, [email or web address] is an excellent reference.    Thank you so much for choosing [facility].  Please contact us with any questions that you may have.   We appreciate the opportunity to serve you now and look forward to supporting your healthcare needs for a long time to come!    Most Sincerely,     Mary Langston MD

## 2025-01-14 ENCOUNTER — LAB (OUTPATIENT)
Dept: LAB | Facility: CLINIC | Age: 55
End: 2025-01-14
Payer: COMMERCIAL

## 2025-01-14 DIAGNOSIS — L68.0 HIRSUTISM: ICD-10-CM

## 2025-01-14 LAB
CORTIS SERPL-MCNC: 18.3 UG/DL
SHBG SERPL-SCNC: 34 NMOL/L (ref 30–135)

## 2025-01-14 PROCEDURE — 83498 ASY HYDROXYPROGESTERONE 17-D: CPT

## 2025-01-14 PROCEDURE — 82157 ASSAY OF ANDROSTENEDIONE: CPT | Mod: 90

## 2025-01-14 PROCEDURE — 84403 ASSAY OF TOTAL TESTOSTERONE: CPT

## 2025-01-14 PROCEDURE — 36415 COLL VENOUS BLD VENIPUNCTURE: CPT

## 2025-01-14 PROCEDURE — 82627 DEHYDROEPIANDROSTERONE: CPT

## 2025-01-14 PROCEDURE — 84270 ASSAY OF SEX HORMONE GLOBUL: CPT

## 2025-01-14 PROCEDURE — 82533 TOTAL CORTISOL: CPT

## 2025-01-14 PROCEDURE — 99000 SPECIMEN HANDLING OFFICE-LAB: CPT

## 2025-01-14 PROCEDURE — 82024 ASSAY OF ACTH: CPT

## 2025-01-15 LAB
ACTH PLAS-MCNC: 15 PG/ML
DHEA-S SERPL-MCNC: 200 UG/DL (ref 35–430)

## 2025-01-16 LAB
17OHP SERPL-MCNC: 38 NG/DL
TESTOST FREE SERPL-MCNC: 0.37 NG/DL
TESTOST SERPL-MCNC: 21 NG/DL (ref 8–60)

## 2025-04-15 ENCOUNTER — PATIENT OUTREACH (OUTPATIENT)
Dept: CARE COORDINATION | Facility: CLINIC | Age: 55
End: 2025-04-15
Payer: COMMERCIAL

## 2025-04-22 DIAGNOSIS — E03.9 HYPOTHYROIDISM, UNSPECIFIED TYPE: ICD-10-CM

## 2025-04-22 RX ORDER — LEVOTHYROXINE SODIUM 112 UG/1
112 TABLET ORAL
Qty: 90 TABLET | Refills: 0 | Status: SHIPPED | OUTPATIENT
Start: 2025-04-22

## 2025-06-19 ENCOUNTER — RESULTS FOLLOW-UP (OUTPATIENT)
Dept: FAMILY MEDICINE | Facility: CLINIC | Age: 55
End: 2025-06-19

## 2025-06-19 ENCOUNTER — LAB (OUTPATIENT)
Dept: LAB | Facility: CLINIC | Age: 55
End: 2025-06-19
Payer: COMMERCIAL

## 2025-06-19 DIAGNOSIS — Z78.9 HEPATITIS B VIRUS SEROLOGIC STATUS UNKNOWN: ICD-10-CM

## 2025-06-19 DIAGNOSIS — E78.5 HYPERLIPIDEMIA LDL GOAL <130: ICD-10-CM

## 2025-06-19 DIAGNOSIS — Z78.9 HEPATITIS B IMMUNE: Primary | ICD-10-CM

## 2025-06-19 DIAGNOSIS — Z13.0 SCREENING FOR DEFICIENCY ANEMIA: ICD-10-CM

## 2025-06-19 DIAGNOSIS — E03.9 HYPOTHYROIDISM, UNSPECIFIED TYPE: ICD-10-CM

## 2025-06-19 LAB
ERYTHROCYTE [DISTWIDTH] IN BLOOD BY AUTOMATED COUNT: 12.7 % (ref 10–15)
HCT VFR BLD AUTO: 39.7 % (ref 35–47)
HGB BLD-MCNC: 13.3 G/DL (ref 11.7–15.7)
MCH RBC QN AUTO: 31.1 PG (ref 26.5–33)
MCHC RBC AUTO-ENTMCNC: 33.5 G/DL (ref 31.5–36.5)
MCV RBC AUTO: 93 FL (ref 78–100)
PLATELET # BLD AUTO: 296 10E3/UL (ref 150–450)
RBC # BLD AUTO: 4.28 10E6/UL (ref 3.8–5.2)
WBC # BLD AUTO: 7.5 10E3/UL (ref 4–11)

## 2025-07-14 PROBLEM — Z78.9 HEPATITIS B IMMUNE: Status: ACTIVE | Noted: 2025-07-14

## (undated) DEVICE — TUBING OXYGEN CANNULA NASAL 7FT

## (undated) DEVICE — KIT ENDO TURNOVER/PROCEDURE W/CLEAN A SCOPE LINERS 103888

## (undated) DEVICE — ENDO TRAP POLYP QUICK CATCH 710201

## (undated) DEVICE — ENDO FORCEP BX CAPTURA JUMBO SPIKE 2.8MMX230CM G53042

## (undated) DEVICE — ENDO SNARE POLYPECTOMY OVAL 15MM LOOP SD-240U-15

## (undated) DEVICE — ESU GROUND PAD ADULT W/CORD E7507

## (undated) RX ORDER — FENTANYL CITRATE 0.05 MG/ML
INJECTION, SOLUTION INTRAMUSCULAR; INTRAVENOUS
Status: DISPENSED
Start: 2022-03-23

## (undated) RX ORDER — LIDOCAINE HYDROCHLORIDE 20 MG/ML
JELLY TOPICAL
Status: DISPENSED
Start: 2022-03-23